# Patient Record
Sex: FEMALE | Race: WHITE | NOT HISPANIC OR LATINO | Employment: OTHER | URBAN - METROPOLITAN AREA
[De-identification: names, ages, dates, MRNs, and addresses within clinical notes are randomized per-mention and may not be internally consistent; named-entity substitution may affect disease eponyms.]

---

## 2017-05-18 ENCOUNTER — OFFICE VISIT (OUTPATIENT)
Dept: OPHTHALMOLOGY | Facility: CLINIC | Age: 77
End: 2017-05-18
Attending: OPHTHALMOLOGY
Payer: COMMERCIAL

## 2017-05-18 DIAGNOSIS — H53.40 VISUAL FIELD DEFECT: ICD-10-CM

## 2017-05-18 DIAGNOSIS — Z96.1 PSEUDOPHAKIA: ICD-10-CM

## 2017-05-18 DIAGNOSIS — H35.352 CYSTOID MACULAR DEGENERATION OF RETINA, LEFT: ICD-10-CM

## 2017-05-18 DIAGNOSIS — H54.7 DECREASED VISION: Primary | ICD-10-CM

## 2017-05-18 PROCEDURE — 99215 OFFICE O/P EST HI 40 MIN: CPT | Mod: ZF

## 2017-05-18 PROCEDURE — 92015 DETERMINE REFRACTIVE STATE: CPT | Mod: 52,ZF

## 2017-05-18 RX ORDER — FLUOROMETHOLONE 0.1 %
1 SUSPENSION, DROPS(FINAL DOSAGE FORM)(ML) OPHTHALMIC (EYE) DAILY
Qty: 3 BOTTLE | Refills: 3 | Status: SHIPPED | OUTPATIENT
Start: 2017-05-18 | End: 2017-11-28

## 2017-05-18 RX ORDER — PREDNISOLONE ACETATE 10 MG/ML
1 SUSPENSION/ DROPS OPHTHALMIC DAILY
Qty: 1 BOTTLE | Refills: 3 | Status: CANCELLED | OUTPATIENT
Start: 2017-05-18

## 2017-05-18 RX ORDER — PREDNISOLONE ACETATE 10 MG/ML
1 SUSPENSION/ DROPS OPHTHALMIC 4 TIMES DAILY
COMMUNITY
End: 2017-09-28

## 2017-05-18 RX ORDER — BROMFENAC 1.03 MG/ML
1 SOLUTION/ DROPS OPHTHALMIC DAILY
Qty: 1 BOTTLE | Refills: 3 | Status: SHIPPED | OUTPATIENT
Start: 2017-05-18 | End: 2017-05-18

## 2017-05-18 RX ORDER — BROMFENAC 1.03 MG/ML
1 SOLUTION/ DROPS OPHTHALMIC DAILY
Qty: 3 BOTTLE | Refills: 3 | Status: SHIPPED | OUTPATIENT
Start: 2017-05-18 | End: 2017-11-28

## 2017-05-18 RX ORDER — DIFLUPREDNATE OPHTHALMIC 0.5 MG/ML
1 EMULSION OPHTHALMIC 3 TIMES DAILY
Qty: 1 BOTTLE | Refills: 3 | Status: SHIPPED | OUTPATIENT
Start: 2017-05-18 | End: 2017-05-25

## 2017-05-18 RX ORDER — FLUOROMETHOLONE 0.1 %
1 SUSPENSION, DROPS(FINAL DOSAGE FORM)(ML) OPHTHALMIC (EYE) DAILY
Qty: 1 BOTTLE | Refills: 3 | Status: SHIPPED | OUTPATIENT
Start: 2017-05-18 | End: 2017-05-18

## 2017-05-18 ASSESSMENT — VISUAL ACUITY
OS_SC: 20/40
CORRECTION_TYPE: GLASSES, CONTACTS
OD_CC+: -3
OD_CC: 20/30
OS_SC+: -2
METHOD: SNELLEN - LINEAR

## 2017-05-18 ASSESSMENT — EXTERNAL EXAM - LEFT EYE: OS_EXAM: NORMAL

## 2017-05-18 ASSESSMENT — REFRACTION_MANIFEST
OS_CYLINDER: +2.50
OS_AXIS: 110
OS_SPHERE: -1.75

## 2017-05-18 ASSESSMENT — TONOMETRY
OD_IOP_MMHG: 14
IOP_METHOD: TONOPEN
OS_IOP_MMHG: 16

## 2017-05-18 ASSESSMENT — SLIT LAMP EXAM - LIDS
COMMENTS: NORMAL
COMMENTS: NORMAL

## 2017-05-18 ASSESSMENT — CONF VISUAL FIELD
OS_NORMAL: 1
OD_NORMAL: 1

## 2017-05-18 ASSESSMENT — CUP TO DISC RATIO
OD_RATIO: 0.2
OS_RATIO: 0.1

## 2017-05-18 ASSESSMENT — EXTERNAL EXAM - RIGHT EYE: OD_EXAM: NORMAL

## 2017-05-18 NOTE — PROGRESS NOTES
CC: CME after RD OS    HPI:   76 year old here for follow up for h/o Retinal detachment  left eye and persistent cystoid macular edema. Feels that vision is slightly blurrier in left eye as well as micropsia left eye. No photophobia.     Ran out of Bromday last week~5 days ago and only using steroid drop daily now.     POH  Pars plana vitrectomy (PPV)/SBP/PPL/FGx C3F8 10/3/13 for recurrent Rhegmatogenous retinal detachment and cataract  Pars plana vitrectomy OS  (PPV)/FGx 6/2013 by Oli ghotra with Dr. Abraham to left eye on 10/15/14      Gtts:  PF qday OS   Bromday OS (ran out 5 days ago)      RETINAL IMAGING  OCT 05/18/17   Right eye- Epiretinal membrane with traction and distortion of fovea, stable  Left eye - mild Epiretinal membrane, slightly worsening CME. -->417->347        AF   3-6-15  right eye - macula irregular  left eye - macula irregular, optic nerve head with bright hyperAF    fluorescein angiography 4-21-15  right eye - mild macular leakge  left eye - 1-2+ macular leakage, 1+ optic nerve head leakage    Visual Field:  OVF G-Top  3/6/15  Right eye: reliable, diffuse defects, non-specific   Left eye: reliable, diffuse defects, non-specific    Assessment & Plan      Magali Macdonald is a 76 year old female with the following diagnoses:   1. Decreased vision - Left Eye    2. Cystoid macular degeneration of retina, left    3. Visual field defect    4. Pseudophakia - Both Eyes         - Cystoid macular edema left eye - nearly resolved  - Has been using Predforte and bromfenac daily  - Continue Bromday daily left eye  - Switch to FML daily left eye   - OK to update contact lens prescription at this time  - Encouraged artificial tears twice a day and as needed       Patient disposition:   Return in about 4 weeks (around 6/15/2017) for Follow Up Joel Coker, OCT Macula.       Attending Physician Attestation: Complete documentation of historical and exam elements from today's encounter  can be found in the full encounter summary report (not reduplicated in this progress note). I personally obtained the chief complaint(s) and history of present illness.  I confirmed and edited as necessary the review of systems, past medical/surgical history, family history, social history, and examination findings as documented by others; and I examined the patient myself. I personally reviewed the relevant tests, images, and reports as documented above. I formulated and edited as necessary the assessment and plan and discussed the findings and management plan with the patient and family. - Moises Abraham MD  5/18/2017

## 2017-05-18 NOTE — MR AVS SNAPSHOT
After Visit Summary   5/18/2017    Magali Macdonald    MRN: [id number]           Patient Information     Date Of Birth          1940        Visit Information        Provider Department      5/18/2017 7:30 AM Moises Abraham MD [facility] Clinic        Today's Diagnoses     Decreased vision - Left Eye    -  1    Cystoid macular degeneration of retina, left        Visual field defect        Pseudophakia - Both Eyes           Follow-ups after your visit        Follow-up notes from your care team     Return in about 4 weeks (around 6/15/2017) for Follow Up Joel Coker, OCT Macula.      Future tests that were ordered for you today     Open Future Orders        Priority Expected Expires Ordered    OCT Retina Spectralis OU (both eyes) Routine  11/17/2018 5/18/2017            Who to contact     Please call your clinic at [phone] to:    Ask questions about your health    Make or cancel appointments    Discuss your medicines    Learn about your test results    Speak to your doctor   If you have compliments or concerns about an experience at your clinic, or if you wish to file a complaint, please contact [facility] Patient Relations at [phone] or email us at [email or web address]         Additional Information About Your Visit        MyChart Information     [employer] gives you secure access to your electronic health record. If you see a primary care provider, you can also send messages to your care team and make appointments. If you have questions, please call your primary care clinic.  If you do not have a primary care provider, please call [phone] and they will assist you.      [employer] is an electronic gateway that provides easy, online access to your medical records. With [employer], you can request a clinic appointment, read your test results, renew a prescription or communicate with your care team.     To access your existing account, please contact  your [facility] or call [phone] for assistance.        Care EveryWhere ID     This is your Care EveryWhere ID. This could be used by other organizations to access your [place] medical records  [id number]         Blood Pressure from Last 3 Encounters:   No data found for BP    Weight from Last 3 Encounters:   No data found for Wt                 Today's Medication Changes          These changes are accurate as of: 5/18/17  9:23 AM.  If you have any questions, ask your nurse or doctor.               Start taking these medicines.        Dose/Directions    Bromfenac Sodium 0.09 % Soln   Commonly known as:  BROMDAY   Used for:  Cystoid macular degeneration of retina, left, Visual field defect   Started by:  Moises Abraham MD        Dose:  1 drop   Place 1 drop Into the left eye daily   Quantity:  3 Bottle   Refills:  3       fluorometholone 0.1 % ophthalmic susp   Commonly known as:  FML LIQUIFILM   Started by:  Moises Abraham MD        Dose:  1 drop   Place 1 drop Into the left eye daily   Quantity:  3 Bottle   Refills:  3            Where to get your medicines      These medications were sent to [facility] [address] [place] [address]     Phone:  [phone]     Bromfenac Sodium 0.09 % Soln    difluprednate 0.05 % ophthalmic emulsion    fluorometholone 0.1 % ophthalmic susp                Primary Care Provider Office Phone # Fax #    JESÚS Yanes [phone]       [place] [address]        Thank you!     Thank you for choosing EYE CLINIC  for your care. Our goal is always to provide you with excellent care. Hearing back from our patients is one way we can continue to improve our services. Please take a few minutes to complete the written survey that you may receive in the mail after your visit with us. Thank you!             Your Updated  Medication List - Protect others around you: Learn how to safely use, store and throw away your medicines at www.disposemymeds.org.          This list is accurate as of: 5/18/17  9:23 AM.  Always use your most recent med list.                   Brand Name Dispense Instructions for use    Bromfenac Sodium 0.09 % Soln    BROMDAY    3 Bottle    Place 1 drop Into the left eye daily       calcium carb 1250 mg (500 mg [place])/vitamin D 200 units 500-200 MG-UNIT per tablet    OSCAL with D     Take 1 tablet by mouth 2 times daily (with meals)       difluprednate 0.05 % ophthalmic emulsion    DUREZOL    1 Bottle    Place 1 drop Into the left eye 3 times daily       fluorometholone 0.1 % ophthalmic susp    FML LIQUIFILM    3 Bottle    Place 1 drop Into the left eye daily       levothyroxine 25 mcg/mL Susp    SYNTHROID     Take by mouth daily Pt alternating 75mg one day and 50mg the next.       OMEGA-3 FISH OIL PO          prednisoLONE acetate 1 % ophthalmic susp    PRED FORTE     1 drop 4 times daily Using qd LE only       TRAZODONE HCL PO      Take 0.5 tablets by mouth At Bedtime       VITAMIN B 12 PO

## 2017-05-18 NOTE — NURSING NOTE
Chief Complaints and History of Present Illnesses   Patient presents with     Follow Up For     Foggy VA LE     HPI    Last Eye Exam:  12/20/16   Affected eye(s):  Left   Symptoms:     Blurred vision   Decreased vision      Duration:  2 months   Frequency:  Constant, Daily          Comments:  Pt complains of foggy vision LE. Has been for last 2 months. Notes that she ran out of Bromday last week and currently using Prednisolone qd LE. Denies any pain today. Notes that she is seeing small floaters morning only, thinks she sees 2 spots. Denies any flashing lights. SUMIT SO, COA 7:47 AM 05/18/2017

## 2017-09-27 ENCOUNTER — TELEPHONE (OUTPATIENT)
Dept: OPHTHALMOLOGY | Facility: CLINIC | Age: 77
End: 2017-09-27

## 2017-09-27 NOTE — TELEPHONE ENCOUNTER
Pt would like to review if eye doing better and if needs to continue eye drops  Unsure of any vision changes-- blurry vision in AM in left eye then improves during day  No eye pain    Pt would like to see dr. Maldonado soon  Reviewed will be on paternity leave soon and current schedule overbooked  Offered evaluation with [facility] and pt accepts  Able to schedule tomorrow with dr. Sanchez  Pt seemed pleased with date/time  Jason Ortiz RN 4:57 PM 09/27/17

## 2017-09-27 NOTE — TELEPHONE ENCOUNTER
----- Message from Mallorie Crockett sent at 9/27/2017  4:17 PM CDT -----  Regarding: Pt of Dr Maldonado  Contact: [phone]  Pt Ph # [phone],    Pt of Dr Maldonado,    Pt wanted to get in asap with Dr Maldonado for eye changes and to see about continuing or discontinuing medications, No openings until November, so Pt said wanted to talk to Nurse instead to see what she can do,    Thanks,  Mallorie in Call Center    Please DO NOT send this message and/or reply back to sender.  Call Center Representatives DO NOT respond to messages.

## 2017-09-28 ENCOUNTER — OFFICE VISIT (OUTPATIENT)
Dept: OPHTHALMOLOGY | Facility: CLINIC | Age: 77
End: 2017-09-28
Attending: OPHTHALMOLOGY
Payer: COMMERCIAL

## 2017-09-28 DIAGNOSIS — H35.352 CME (CYSTOID MACULAR EDEMA), LEFT: Primary | ICD-10-CM

## 2017-09-28 DIAGNOSIS — H18.20 CORNEAL EDEMA OF LEFT EYE: ICD-10-CM

## 2017-09-28 PROCEDURE — 92134 CPTRZ OPH DX IMG PST SGM RTA: CPT | Mod: ZF | Performed by: OPHTHALMOLOGY

## 2017-09-28 PROCEDURE — 99213 OFFICE O/P EST LOW 20 MIN: CPT | Mod: 25,ZF

## 2017-09-28 RX ORDER — SILICONE ADHESIVE 1.5" X 3"
1 SHEET (EA) TOPICAL AT BEDTIME
Qty: 1 BOTTLE | Refills: 11 | Status: SHIPPED | OUTPATIENT
Start: 2017-09-28 | End: 2018-06-12

## 2017-09-28 ASSESSMENT — VISUAL ACUITY
OS_CC+: -3
OS_SC+: +1
OD_CC+: -3
OS_SC: 20/60
CORRECTION_TYPE: CONTACTS
OS_CC: 20/50
METHOD: SNELLEN - LINEAR
OD_CC: 20/25

## 2017-09-28 ASSESSMENT — SLIT LAMP EXAM - LIDS
COMMENTS: NORMAL
COMMENTS: NORMAL

## 2017-09-28 ASSESSMENT — REFRACTION_WEARINGRX
OS_SPHERE: -2.00
OD_ADD: +3.00
OS_CYLINDER: +3.00
OD_AXIS: 035
OS_AXIS: 110
OD_SPHERE: -6.00
SPECS_TYPE: PAL
OD_CYLINDER: +0.50

## 2017-09-28 ASSESSMENT — EXTERNAL EXAM - LEFT EYE: OS_EXAM: NORMAL

## 2017-09-28 ASSESSMENT — PACHYMETRY
OS_CT(UM): 665
OD_CT(UM): 601

## 2017-09-28 ASSESSMENT — TONOMETRY
OD_IOP_MMHG: 18
OS_IOP_MMHG: 09
IOP_METHOD: ICARE

## 2017-09-28 ASSESSMENT — CUP TO DISC RATIO
OD_RATIO: 0.2
OS_RATIO: 0.1

## 2017-09-28 ASSESSMENT — EXTERNAL EXAM - RIGHT EYE: OD_EXAM: NORMAL

## 2017-09-28 ASSESSMENT — CONF VISUAL FIELD
METHOD: COUNTING FINGERS
OD_NORMAL: 1
OS_NORMAL: 1

## 2017-09-28 NOTE — MR AVS SNAPSHOT
After Visit Summary   9/28/2017    Magali Macdonald    MRN: [id number]           Patient Information     Date Of Birth          1940        Visit Information        Provider Department      9/28/2017 1:15 PM Rama Narayanan MD Eye Clinic        Today's Diagnoses     CME (cystoid macular edema), left    -  1    Corneal edema of left eye           Follow-ups after your visit        Follow-up notes from your care team     Return in about 4 weeks (around 10/26/2017) for DFE, OCT Mac, CME.      Your next 10 appointments already scheduled     Oct 06, 2017  8:00 AM CDT   NEW [place]NEA with Phillip Canchola MD   Eye Clinic ([facility])    [address] [place] [id number]   [phone]            Nov 02, 2017 10:15 AM CDT   RETURN RETINA with Rama Narayanan MD   Eye Clinic ([facility])    [address]  [address] [address] [phone]              Who to contact     Please call your clinic at [phone] to:    Ask questions about your health    Make or cancel appointments    Discuss your medicines    Learn about your test results    Speak to your doctor   If you have compliments or concerns about an experience at your clinic, or if you wish to file a complaint, please contact [facility] Patient Relations at [phone] or email us at [email or web address]         Additional Information About Your Visit        Wantfulhart Information     [employer] gives you secure access to your electronic health record. If you see a primary care provider, you can also send messages to your care team and make appointments. If you have questions, please call your primary care clinic.  If you do not have a primary care provider, please call [phone] and they will assist you.      [employer] is an electronic gateway that provides easy, online  access to your medical records. With [employer], you can request a clinic appointment, read your test results, renew a prescription or communicate with your care team.     To access your existing account, please contact your [facility] or call [phone] for assistance.        Care EveryWhere ID     This is your Care EveryWhere ID. This could be used by other organizations to access your [place] medical records  [id number]         Blood Pressure from Last 3 Encounters:   No data found for BP    Weight from Last 3 Encounters:   No data found for Wt              We Performed the Following     OCT Retina Spectralis OU (both eyes)          Today's Medication Changes          These changes are accurate as of: 9/28/17  2:46 PM.  If you have any questions, ask your nurse or doctor.               Start taking these medicines.        Dose/Directions    sodium chloride 5 % ophthalmic solution   Commonly known as:  HANDY 128   Used for:  Corneal edema of left eye   Started by:  Rama Narayanan MD        Dose:  1 drop   Place 1 drop Into the left eye At Bedtime   Quantity:  1 Bottle   Refills:  11            Where to get your medicines      These medications were sent to [facility] [address] [place] [address]     Phone:  [phone]     sodium chloride 5 % ophthalmic solution                Primary Care Provider Office Phone # Fax #    M Essie Yanes [phone]       [facility] [address] [place], [place] [id number]        Equal Access to Services     FLAVIO DEJESUS AH: Hadii lisandro martinez hadasho Soomaali, waaxda luqadaha, qaybta kaalmada adeegyada, laxmi livingston. So [facility] [phone].    ATENCIÓN: Si habla español, tiene a amaya disposición servicios gratuitos de asistencia lingüística. Llame al [phone].    We comply with applicable federal civil rights laws and  [place] laws. We do not discriminate on the basis of race, color, national origin, age, disability sex, sexual orientation or gender identity.            Thank you!     Thank you for choosing EYE CLINIC  for your care. Our goal is always to provide you with excellent care. Hearing back from our patients is one way we can continue to improve our services. Please take a few minutes to complete the written survey that you may receive in the mail after your visit with us. Thank you!             Your Updated Medication List - Protect others around you: Learn how to safely use, store and throw away your medicines at www.disposemymeds.org.          This list is accurate as of: 9/28/17  2:46 PM.  Always use your most recent med list.                   Brand Name Dispense Instructions for use Diagnosis    Bromfenac Sodium 0.09 % Soln    BROMDAY    3 Bottle    Place 1 drop Into the left eye daily    Cystoid macular degeneration of retina, left, Visual field defect       calcium carb 1250 mg (500 mg [place])/vitamin D 200 units 500-200 MG-UNIT per tablet    OSCAL with D     Take 1 tablet by mouth 2 times daily (with meals)        fluorometholone 0.1 % ophthalmic susp    FML LIQUIFILM    3 Bottle    Place 1 drop Into the left eye daily        levothyroxine 25 mcg/mL Susp    SYNTHROID     Take by mouth daily Pt alternating 75mg one day and 50mg the next.        OMEGA-3 FISH OIL PO           sodium chloride 5 % ophthalmic solution    HANDY 128    1 Bottle    Place 1 drop Into the left eye At Bedtime    Corneal edema of left eye       TRAZODONE HCL PO      Take 0.5 tablets by mouth At Bedtime        VITAMIN B 12 PO

## 2017-09-28 NOTE — PROGRESS NOTES
I have confirmed the patient's and reviewed Past Medical History, Past Surgical History, Social History, Family History, Problem List, Medication List and agree with Tech note.      CC: blurry vision Left Eye, history of CME after RD OS    HPI: 77 year old here for follow up for h/o Retinal detachment  left eye and persistent cystoid macular edema. She recently experiences for the past month blurring of vision in her left eye in am that last for two hours and resolves.    POH  Pars plana vitrectomy (PPV)/SBP/PPL/FGx C3F8 10/3/13 for recurrent Rhegmatogenous retinal detachment and cataract  Pars plana vitrectomy OS  (PPV)/FGx 6/2013 by Oli  ACIOL placement with Dr. Abraham to left eye on 10/15/14      Gtts:  FML daily  Bromfenac daily (ran out two weeks ago)      RETINAL IMAGING  OCT 12-20-16  Right eye- Epiretinal membrane with traction and distortion of fovea, stable  Left eye - mild Epiretinal membrane, two small new pockets of Cystoid Macular Edema inferior macula    AF   3-6-15  right eye - macula irregular  left eye - macula irregular, optic nerve head with bright hyperAF    fluorescein angiography 4-21-15  right eye - mild macular leakge  left eye - 1-2+ macular leakage, 1+ optic nerve head leakage    Visual Field:  OVF G-Top  3/6/15  Right eye: reliable, diffuse defects, non-specific   Left eye: reliable, diffuse defects, non-specific        Assessment/Plan:  0) Corneal edema (pseudophakic keratopathy)  - likely secondary to ACIOL, pachy as above  - start rah Four times a day Left Eye   - will have see Dr. 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 Abraham     8) Nuclear sclerosis right eye mild      return to clinic 2 weeks with Dr. Canchola, retina 1-2 months, OCT OU    Tc Bai MD, PhD  Vitreoretinal Surgery Fellow    Attestation:  I have seen and examined the patient with Dr. Bai and agree with the findings in this note, as well as the interpretations of the diagnostic tests.      Rama Pérez MD PhD.  Professor & Chair

## 2017-09-28 NOTE — NURSING NOTE
Chief Complaints and History of Present Illnesses   Patient presents with     Follow Up For     9 month follow up CME after RD OS     HPI    Affected eye(s):  Both   Symptoms:     No floaters   No flashes   No redness   No Dryness         Do you have eye pain now?:  No      Comments:  Pt states vision is stable in RE. Vision in LE fluctuates daily. Pt notes that vision in LE is very cloudy upon waking but clears as the day goes on.    Ute FINLEY September 28, 2017 1:17 PM

## 2017-10-05 DIAGNOSIS — H18.20 CORNEAL EDEMA: Primary | ICD-10-CM

## 2017-10-06 ENCOUNTER — OFFICE VISIT (OUTPATIENT)
Dept: OPHTHALMOLOGY | Facility: CLINIC | Age: 77
End: 2017-10-06
Attending: OPHTHALMOLOGY
Payer: COMMERCIAL

## 2017-10-06 DIAGNOSIS — H59.012 PSEUDOPHAKIC BULLOUS KERATOPATHY OF LEFT EYE: Primary | ICD-10-CM

## 2017-10-06 PROCEDURE — 99213 OFFICE O/P EST LOW 20 MIN: CPT | Mod: ZF

## 2017-10-06 ASSESSMENT — VISUAL ACUITY
OD_CC: 20/25
CORRECTION_TYPE: CONTACTS
OS_SC: 20/100
OD_CC+: -3
METHOD: SNELLEN - LINEAR

## 2017-10-06 ASSESSMENT — CONF VISUAL FIELD
OS_NORMAL: 1
OD_NORMAL: 1

## 2017-10-06 ASSESSMENT — TONOMETRY
OS_IOP_MMHG: 16
IOP_METHOD: ICARE
OD_IOP_MMHG: 21

## 2017-10-06 ASSESSMENT — PACHYMETRY
OD_CT(UM): 601
OS_CT(UM): 665

## 2017-10-06 NOTE — MR AVS SNAPSHOT
After Visit Summary   10/6/2017    Magali Macdonald    MRN: [id number]           Patient Information     Date Of Birth          1940        Visit Information        Provider Department      10/6/2017 8:00 AM Phillip Canchola MD Eye Clinic        Today's Diagnoses     Pseudophakic bullous keratopathy of left eye    -  1       Follow-ups after your visit        Follow-up notes from your care team     Return in about 3 months (around 2018).      Your next 10 appointments already scheduled     2017 10:15 AM CDT   RETURN RETINA with Rama Narayanan MD   Eye Clinic ([facility])    [address]  [address] [facility]  9th Fl Clin 9[address]  [facility] [address] [phone]              Who to contact     Please call your clinic at [phone] to:    Ask questions about your health    Make or cancel appointments    Discuss your medicines    Learn about your test results    Speak to your doctor   If you have compliments or concerns about an experience at your clinic, or if you wish to file a complaint, please contact [facility] Patient Relations at [phone] or email us at [email or web address]         Additional Information About Your Visit        MyChart Information     [employer] is an electronic gateway that provides easy, online access to your medical records. With [employer], you can request a clinic appointment, read your test results, renew a prescription or communicate with your care team.     To sign up for [employer] visit the website at [email or web address]   You will be asked to enter the access code listed below, as well as some personal information. Please follow the directions to create your username and password.     Your access code is: [email or web address]  Expires: 2018  5:26 PM     Your access code will  in 90 days. If you need help or a new code, please contact your [facility]  Clinic or call [phone] for assistance.        Care EveryWhere ID     This is your Care EveryWhere ID. This could be used by other organizations to access your [place] medical records  [id number]         Blood Pressure from Last 3 Encounters:   No data found for BP    Weight from Last 3 Encounters:   No data found for Wt              We Performed the Following     US OPHTHALMIC DIAG, PACHYMETRY        Primary Care Provider Office Phone # Fax #    JESÚS Yanes [phone]       [facility] [address]  [facility] [address]        Equal Access to Services     [facility]: Hadii aad ku hadasho Soomaali, waaxda luqadaha, qaybta kaalmada adeegyada, waxtheodora hectorin hayjennifer medrano . So [facility] [phone].    ATENCIÓN: Si habla español, tiene a amaya disposición servicios gratuitos de asistencia lingüística. [facility] [phone].    We comply with applicable federal civil rights laws and [place] laws. We do not discriminate on the basis of race, color, national origin, age, disability, sex, sexual orientation, or gender identity.            Thank you!     Thank you for choosing EYE CLINIC  for your care. Our goal is always to provide you with excellent care. Hearing back from our patients is one way we can continue to improve our services. Please take a few minutes to complete the written survey that you may receive in the mail after your visit with us. Thank you!             Your Updated Medication List - Protect others around you: Learn how to safely use, store and throw away your medicines at www.disposemymeds.org.          This list is accurate as of: 10/6/17 11:59 PM.  Always use your most recent med list.                   Brand Name Dispense Instructions for use Diagnosis    Bromfenac Sodium 0.09 % Soln    BROMDAY    3 Bottle    Place 1 drop Into the left eye daily    Cystoid macular degeneration of retina, left, Visual field defect       calcium carb 1250 mg (500  mg [place])/vitamin D 200 units 500-200 MG-UNIT per tablet    OSCAL with D     Take 1 tablet by mouth 2 times daily (with meals)        CITALOPRAM HYDROBROMIDE PO           fluorometholone 0.1 % ophthalmic susp    FML LIQUIFILM    3 Bottle    Place 1 drop Into the left eye daily        levothyroxine 25 mcg/mL Susp    SYNTHROID     Take by mouth daily Pt alternating 75mg one day and 50mg the next.        OMEGA-3 FISH OIL PO           sodium chloride 5 % ophthalmic solution    HANDY 128    1 Bottle    Place 1 drop Into the left eye At Bedtime    Corneal edema of left eye       TRAZODONE HCL PO      Take 0.5 tablets by mouth At Bedtime        VITAMIN B 12 PO

## 2017-10-06 NOTE — NURSING NOTE
Chief Complaints and History of Present Illnesses   Patient presents with     Consult For     Corneal Edema LE      HPI    Additional Referring Providers:  Dr. Rama Narayanan MD    Affected eye(s):  Left   Symptoms:        Unknown duration       Do you have eye pain now?:  No      Comments:  Sent here at request of Dr. Lady PAREKH. Pt complains of blurry vision LE. Denies any floaters or flashing lights. Notes that she is using Pietro LE QHS and has a lot of burning after using. Zach any pain or discomfort today. SUMIT SO, COA 8:29 AM 10/06/2017

## 2017-10-18 ASSESSMENT — SLIT LAMP EXAM - LIDS
COMMENTS: NORMAL
COMMENTS: NORMAL

## 2017-10-18 ASSESSMENT — EXTERNAL EXAM - LEFT EYE: OS_EXAM: NORMAL

## 2017-10-18 ASSESSMENT — CUP TO DISC RATIO
OD_RATIO: 0.2
OS_RATIO: 0.1

## 2017-10-18 ASSESSMENT — EXTERNAL EXAM - RIGHT EYE: OD_EXAM: NORMAL

## 2017-10-18 NOTE — PROGRESS NOTES
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 Abraham to left eye on 10/15/14      Gtts:  FML daily stopped 2 weeks ago  Bromfenac daily stopped 2 weeks ago    Assessment/Plan:    1) Corneal edema (pseudophakic keratopathy)  - likely secondary to ACIOL, pachy 749 left eye (597 right eye)  - continue rah Four times a day Left Eye   - counseled re findings and options  - patient would like to defer intervention for now  Reassess in 3 months       2) Cystoid macular edema left eye   - last in 05/2017, two new small pockets of fluid inferior to fovea   - stopped FML and bromfenac recently, observe    3) Epiretinal membrane left eye    - very mild, not likely significant, observe    4) Pseudophakic left eye    - stable, follow up with Dr. Abraham     5) Nuclear sclerosis right eye mild      Return to clinic 3 months to reassess      ~~~~~~~~~~~~~~~~~~~~~~~~~~~~~~~~~~~~~~~~~~~~~~~~~~~~~~~~~~~~~~~~    Complete documentation of historical and exam elements from today's encounter can be found in the full encounter summary report (not reduplicated in this progress note). I personally obtained the chief complaint(s) and history of present illness.  I confirmed and edited as necessary the review of systems, past medical/surgical history, family history, social history, and examination findings as documented by others.  I examined the patient myself, and I personally reviewed the relevant tests, images, and reports as documented above. I formulated and edited as necessary the assessment and plan and discussed the findings and management plan with the patient and family.     Phillip Canchola MD, MA  Director, Cornea & Anterior Segment  [facility]  Department of Ophthalmology & Visual Neuroscience

## 2017-11-02 ENCOUNTER — OFFICE VISIT (OUTPATIENT)
Dept: OPHTHALMOLOGY | Facility: CLINIC | Age: 77
End: 2017-11-02
Attending: OPHTHALMOLOGY
Payer: COMMERCIAL

## 2017-11-02 DIAGNOSIS — H53.40 VISUAL FIELD DEFECT: ICD-10-CM

## 2017-11-02 DIAGNOSIS — H35.352 CME (CYSTOID MACULAR EDEMA), LEFT: ICD-10-CM

## 2017-11-02 DIAGNOSIS — H35.352 CYSTOID MACULAR DEGENERATION OF RETINA, LEFT: ICD-10-CM

## 2017-11-02 PROCEDURE — 99213 OFFICE O/P EST LOW 20 MIN: CPT | Mod: 25,ZF

## 2017-11-02 PROCEDURE — 92134 CPTRZ OPH DX IMG PST SGM RTA: CPT | Mod: ZF | Performed by: OPHTHALMOLOGY

## 2017-11-02 ASSESSMENT — TONOMETRY
OS_IOP_MMHG: 15
OD_IOP_MMHG: 19
IOP_METHOD: TONOPEN

## 2017-11-02 ASSESSMENT — CONF VISUAL FIELD
OD_NORMAL: 1
METHOD: COUNTING FINGERS
OS_NORMAL: 1

## 2017-11-02 ASSESSMENT — VISUAL ACUITY
METHOD: SNELLEN - LINEAR
OS_SC: 20/50
CORRECTION_TYPE: CONTACTS
OD_CC+: -1
OS_SC+: -2
OD_CC: 20/25

## 2017-11-02 ASSESSMENT — SLIT LAMP EXAM - LIDS
COMMENTS: NORMAL
COMMENTS: NORMAL

## 2017-11-02 ASSESSMENT — CUP TO DISC RATIO
OS_RATIO: 0.1
OD_RATIO: 0.2

## 2017-11-02 ASSESSMENT — EXTERNAL EXAM - RIGHT EYE: OD_EXAM: NORMAL

## 2017-11-02 ASSESSMENT — EXTERNAL EXAM - LEFT EYE: OS_EXAM: NORMAL

## 2017-11-02 NOTE — PROGRESS NOTES
I have confirmed the patient's and reviewed Past Medical History, Past Surgical History, Social History, Family History, Problem List, Medication List and agree with Tech note.      CC: blurry vision Left Eye, history of CME after RD OS    HPI: 77 year old here for follow up for h/o Retinal detachment  left eye and persistent cystoid macular edema. She recently experiences for the past month blurring of vision in her left eye in am that last for two hours and resolves.  No interval change per patient     POH  Pars plana vitrectomy (PPV)/SBP/PPL/FGx C3F8 10/3/13 for recurrent Rhegmatogenous retinal detachment and cataract  Pars plana vitrectomy OS  (PPV)/FGx 6/2013 by Oli ghotra with Dr. Abraham to left eye on 10/15/14      Gtts:  Sodium chloride daily left eye         RETINAL IMAGING  OCT 11-02-17  Right eye- Epiretinal membrane with traction and distortion of fovea, stable  Left eye - mild Epiretinal membrane, two small stable pockets of Cystoid Macular Edema inferior macula, stable    AF   3-6-15  right eye - macula irregular  left eye - macula irregular, optic nerve head with bright hyperAF    fluorescein angiography 4-21-15  right eye - mild macular leakge  left eye - 1-2+ macular leakage, 1+ optic nerve head leakage    Visual Field:  OVF G-Top  3/6/15  Right eye: reliable, diffuse defects, non-specific   Left eye: reliable, diffuse defects, non-specific        Assessment/Plan:  0) Corneal edema (pseudophakic keratopathy)  - likely secondary to ACIOL, pachy as above  - start rah Four times a day Left Eye   - will have see Dr. 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 Abraham     8) Nuclear sclerosis right eye mild      return to clini, retina 3 months, OCT OU.  With Kandi Pérez MD PhD.  Professor & Chair

## 2017-11-02 NOTE — MR AVS SNAPSHOT
After Visit Summary   2017    Magali Macdonald    MRN: [id number]           Patient Information     Date Of Birth          1940        Visit Information        Provider Department      2017 10:15 AM Rama Narayanan MD [facility] Clinic        Today's Diagnoses     Cystoid macular degeneration of retina, left        Visual field defect        CME (cystoid macular edema), left           Follow-ups after your visit        Follow-up notes from your care team     Return in about 3 months (around 2018) for ERM , Exam & OCT OU.      Your next 10 appointments already scheduled     2018  9:15 AM CST   RETURN RETINA with Kandi Maldonado MD   Eye Clinic ([facility])    [address]  [address]  [address] Clin [address]  [facility] [id number] [phone]              Who to contact     Please call your clinic at [phone] to:    Ask questions about your health    Make or cancel appointments    Discuss your medicines    Learn about your test results    Speak to your doctor   If you have compliments or concerns about an experience at your clinic, or if you wish to file a complaint, please contact [facility] Patient Relations at [phone] or email us at [email or web address]         Additional Information About Your Visit        MyChart Information     PureWRXt is an electronic gateway that provides easy, online access to your medical records. With [email or web address], you can request a clinic appointment, read your test results, renew a prescription or communicate with your care team.     To sign up for PureWRXt visit the website at [email or web address]   You will be asked to enter the access code listed below, as well as some personal information. Please follow the directions to create your username and password.     Your access code is: [email or web address]  Expires: 2018  5:26 PM     Your access code will  in 90  days. If you need help or a new code, please contact your [facility] or call [phone] for assistance.        Care EveryWhere ID     This is your Care EveryWhere ID. This could be used by other organizations to access your [place] medical records  [id number]         Blood Pressure from Last 3 Encounters:   No data found for BP    Weight from Last 3 Encounters:   No data found for Wt              We Performed the Following     OCT Retina Spectralis OU (both eyes)        Primary Care Provider Office Phone # Fax #    JESÚS Yanes [phone]       [facility] [address]  [facility] [address]        Equal Access to Services     [facility] : Hadii aad ku hadasho Soomaali, waaxda luqadaha, qaybta kaalmada adeegyada, waxay krunalin hayyaeln adesusan medrano . So [facility] [phone].    ATENCIÓN: Si habla español, tiene a amaya disposición servicios gratuitos de asistencia lingüística. Llame al [phone].    We comply with applicable federal civil rights laws and [place] laws. We do not discriminate on the basis of race, color, national origin, age, disability, sex, sexual orientation, or gender identity.            Thank you!     Thank you for choosing EYE CLINIC  for your care. Our goal is always to provide you with excellent care. Hearing back from our patients is one way we can continue to improve our services. Please take a few minutes to complete the written survey that you may receive in the mail after your visit with us. Thank you!             Your Updated Medication List - Protect others around you: Learn how to safely use, store and throw away your medicines at www.disposemymeds.org.          This list is accurate as of: 11/2/17 11:39 AM.  Always use your most recent med list.                   Brand Name Dispense Instructions for use Diagnosis    Bromfenac Sodium 0.09 % Soln    BROMDAY    3 Bottle    Place 1 drop Into the left eye daily     Cystoid macular degeneration of retina, left, Visual field defect       calcium carb 1250 mg (500 mg [place])/vitamin D 200 units 500-200 MG-UNIT per tablet    OSCAL with D     Take 1 tablet by mouth 2 times daily (with meals)        CITALOPRAM HYDROBROMIDE PO           fluorometholone 0.1 % ophthalmic susp    FML LIQUIFILM    3 Bottle    Place 1 drop Into the left eye daily        levothyroxine 25 mcg/mL Susp    SYNTHROID     Take by mouth daily Pt alternating 75mg one day and 50mg the next.        OMEGA-3 FISH OIL PO           sodium chloride 5 % ophthalmic solution    HANDY 128    1 Bottle    Place 1 drop Into the left eye At Bedtime    Corneal edema of left eye       TRAZODONE HCL PO      Take 0.5 tablets by mouth At Bedtime        VITAMIN B 12 PO

## 2017-11-02 NOTE — NURSING NOTE
Chief Complaints and History of Present Illnesses   Patient presents with     Follow Up For     cystoid macular edema     HPI    Affected eye(s):  Both   Symptoms:     No blurred vision      Frequency:  Constant       Do you have eye pain now?:  No      Comments:  Pt states no vision changes since last vision. No new flashes or floaters. No pain- Left eye is a little more tender when rubbing her eye or taking makeup off. AT rarely. [address] QHS OS.    Melita Davalos COT 10:45 AM November 2, 2017

## 2017-11-28 ENCOUNTER — OFFICE VISIT (OUTPATIENT)
Dept: OPTOMETRY | Facility: CLINIC | Age: 77
End: 2017-11-28

## 2017-11-28 DIAGNOSIS — H52.31 ANISOMETROPIA AND ANISEIKONIA: Primary | ICD-10-CM

## 2017-11-28 DIAGNOSIS — H52.32 ANISOMETROPIA AND ANISEIKONIA: Primary | ICD-10-CM

## 2017-11-28 DIAGNOSIS — Z96.1 PSEUDOPHAKIA: ICD-10-CM

## 2017-11-28 ASSESSMENT — VISUAL ACUITY
OD_CC: 20/50-2
OS_SC: 20/125
METHOD: SNELLEN - LINEAR
CORRECTION_TYPE: CONTACTS

## 2017-11-28 ASSESSMENT — REFRACTION_WEARINGRX
OS_CYLINDER: +3.00
SPECS_TYPE: PAL
OS_SPHERE: -2.00
OD_AXIS: 035
OD_CYLINDER: +0.50
OS_AXIS: 110
OD_SPHERE: -6.00
OD_ADD: +3.00

## 2017-11-28 ASSESSMENT — EXTERNAL EXAM - LEFT EYE: OS_EXAM: NORMAL

## 2017-11-28 ASSESSMENT — REFRACTION_MANIFEST
OD_SPHERE: -6.50
OD_CYLINDER: +0.50
OD_AXIS: 030

## 2017-11-28 ASSESSMENT — REFRACTION_CURRENTRX
OD_SPHERE: -5.75
OD_DIAMETER: 9.2
OD_BRAND: OXYFLOW
OD_BASECURVE: 8.05
OD_BASECURVE: 8.0
OD_DIAMETER: 8.2
OD_SPHERE: -3.00

## 2017-11-28 ASSESSMENT — SLIT LAMP EXAM - LIDS
COMMENTS: NORMAL
COMMENTS: NORMAL

## 2017-11-28 ASSESSMENT — EXTERNAL EXAM - RIGHT EYE: OD_EXAM: NORMAL

## 2017-11-28 NOTE — PROGRESS NOTES
A/P  1.) Aniseikonia/Anisometropia OU  -Pt broke right lens, previously doing well  -Mild Rx shift OD 2' to cataract, corrects to 20/20  -Order new lens, mail to pt    2.) Corneal edema OS  -Following with Dr. Canchola, considering surgery  -Rec no CL wear left eye until resolved    RTC prn for CL recheck right eye, otherwise 1 year routine    Contact Lens Billing  V-Code:  - GP Spherical  Final Contact Lens Rx      Brand Base Curve Diameter Sphere Lens Addl. Specs   Right ART RGP Optimum Comfort 8.05 8.2 -6.25 plasma treated Blue   Left                  # of units: 1  Price per Unit: $80    This patient requires contact lenses that are medically necessary for either improvement in vision over spectacles, support of the ocular surface, or other therapeutic benefit. These are not cosmetic contact lenses.  Resolution of aniseikonia vs glasses    Encounter Diagnoses   Name Primary?     Anisometropia and aniseikonia Yes     Pseudophakia - Left Eye

## 2017-11-28 NOTE — MR AVS SNAPSHOT
After Visit Summary   2017    Magali Macdonald    MRN: [id number]           Patient Information     Date Of Birth          1940        Visit Information        Provider Department      2017 10:30 AM Geeta Coker OD Eye Clinic         Follow-ups after your visit        Your next 10 appointments already scheduled     2018  2:15 PM CST   RETURN CORNEA with Phillip Canchola MD   Eye Clinic ([facility])    [address]  [address]  [address] [address]  [place] [address]   [phone]            2018  9:15 AM CST   RETURN RETINA with Kandi Maldonado MD   Eye Clinic ([facility])    [address]  [address]  [address] [address]  [place] [address]   [phone]              Who to contact     Please call your clinic at [phone] to:    Ask questions about your health    Make or cancel appointments    Discuss your medicines    Learn about your test results    Speak to your doctor   If you have compliments or concerns about an experience at your clinic, or if you wish to file a complaint, please contact [facility] Patient Relations at [phone] or email us at [email or web address]         Additional Information About Your Visit        MyChart Information     Onyu is an electronic gateway that provides easy, online access to your medical records. With Onyu, you can request a clinic appointment, read your test results, renew a prescription or communicate with your care team.     To sign up for [email or web address] Pharmaceuticalst visit the website at [email or web address]   You will be asked to enter the access code listed below, as well as some personal information. Please follow the directions to create your username and password.     Your access code is: [email or web address]  Expires: 2018  4:26 PM     Your access code will  in 90 days. If you need help or a new code, please  contact your [facility] or call [phone] for assistance.        Care EveryWhere ID     This is your Care EveryWhere ID. This could be used by other organizations to access your [place] medical records  [id number]         Blood Pressure from Last 3 Encounters:   No data found for BP    Weight from Last 3 Encounters:   No data found for Wt              Today, you had the following     No orders found for display       Primary Care Provider Office Phone # Fax #    JESÚS Yanes [phone]       [facility] [address]  [facility] [address]        Equal Access to Services     [facility]: Hadii aad ku hadasho Soomaali, waaxda luqadaha, qaybta kaalmada adeegyada, laxmi talley hayaan adesusan medrano . So [facility] [phone].    ATENCIÓN: Si habla español, tiene a amaya disposición servicios gratuitos de asistencia lingüística. Ll[facility]ake[facility] [phone].    We comply with applicable federal civil rights laws and [place] laws. We do not discriminate on the basis of race, color, national origin, age, disability, sex, sexual orientation, or gender identity.            Thank you!     Thank you for choosing EYE CLINIC  for your care. Our goal is always to provide you with excellent care. Hearing back from our patients is one way we can continue to improve our services. Please take a few minutes to complete the written survey that you may receive in the mail after your visit with us. Thank you!             Your Updated Medication List - Protect others around you: Learn how to safely use, store and throw away your medicines at www.disposemymeds.org.          This list is accurate as of: 11/28/17 11:03 AM.  Always use your most recent med list.                   Brand Name Dispense Instructions for use Diagnosis    Calcium carb-Vitamin D 500 mg [place]-200 units 500-200 MG-UNIT per tablet    OSCAL with D;Oyster Shell Calcium     Take 1 tablet by mouth 2 times  daily (with meals)        CITALOPRAM HYDROBROMIDE PO           levothyroxine 25 mcg/mL Susp    SYNTHROID     Take 75 mg by mouth daily        OMEGA-3 FISH OIL PO           sodium chloride 5 % ophthalmic solution    HANDY 128    1 Bottle    Place 1 drop Into the left eye At Bedtime    Corneal edema of left eye       TRAZODONE HCL PO      Take 0.5 tablets by mouth At Bedtime        VITAMIN B 12 PO

## 2017-12-18 ENCOUNTER — TELEPHONE (OUTPATIENT)
Dept: OPHTHALMOLOGY | Facility: CLINIC | Age: 77
End: 2017-12-18

## 2017-12-18 NOTE — TELEPHONE ENCOUNTER
I spoke to the patient who notes that she has photophobia on sylvia days as well as cloudy days.  I recommended that she look at the [facility] for the driving lens fit over (frame name Elizabeth).  I also set up a tinted lens appointment for her at the time of her next visit.  The patient thanked me for the information.

## 2018-01-04 ENCOUNTER — OFFICE VISIT (OUTPATIENT)
Dept: OPHTHALMOLOGY | Facility: CLINIC | Age: 78
End: 2018-01-04
Attending: OPHTHALMOLOGY
Payer: COMMERCIAL

## 2018-01-04 ENCOUNTER — OFFICE VISIT (OUTPATIENT)
Dept: OPHTHALMOLOGY | Facility: CLINIC | Age: 78
End: 2018-01-04
Payer: COMMERCIAL

## 2018-01-04 DIAGNOSIS — H52.31 ANISOMETROPIA: ICD-10-CM

## 2018-01-04 DIAGNOSIS — H18.20 CORNEAL EDEMA OF LEFT EYE: Primary | ICD-10-CM

## 2018-01-04 DIAGNOSIS — Z96.1 PSEUDOPHAKIA OF LEFT EYE: ICD-10-CM

## 2018-01-04 DIAGNOSIS — H53.8 BLURRED VISION: ICD-10-CM

## 2018-01-04 DIAGNOSIS — H52.32 ANISEIKONIA: Primary | ICD-10-CM

## 2018-01-04 DIAGNOSIS — H35.373 EPIRETINAL MEMBRANE (ERM) OF BOTH EYES: ICD-10-CM

## 2018-01-04 DIAGNOSIS — H35.352 CYSTOID MACULAR DEGENERATION OF LEFT EYE: ICD-10-CM

## 2018-01-04 PROCEDURE — G0463 HOSPITAL OUTPT CLINIC VISIT: HCPCS | Mod: ZF

## 2018-01-04 ASSESSMENT — PACHYMETRY: OS_CT(UM): 610

## 2018-01-04 ASSESSMENT — VISUAL ACUITY
METHOD: SNELLEN - LINEAR
OD_CC: 20/20
CORRECTION_TYPE: CONTACTS
OD_CC+: -1
OD_CC: 20/20
OD_CC+: -1
OS_SC: 20/100
OS_SC: 20/100
METHOD: SNELLEN - LINEAR
CORRECTION_TYPE: CONTACTS

## 2018-01-04 ASSESSMENT — REFRACTION_CURRENTRX
OD_SPHERE: -6.25
OD_BASECURVE: 8.05
OD_DIAMETER: 8.2
OD_ADDL_SPECS: BLUE

## 2018-01-04 ASSESSMENT — TONOMETRY
OS_IOP_MMHG: 10
IOP_METHOD: TONOPEN
OD_IOP_MMHG: RGP
OS_IOP_MMHG: 10
OD_IOP_MMHG: CTL
IOP_METHOD: TONOPEN

## 2018-01-04 ASSESSMENT — SLIT LAMP EXAM - LIDS
COMMENTS: NORMAL

## 2018-01-04 ASSESSMENT — REFRACTION_WEARINGRX
OD_AXIS: 035
OS_SPHERE: -2.00
OS_AXIS: 110
OD_ADD: +3.00
OD_SPHERE: -6.00
OD_CYLINDER: +0.50
SPECS_TYPE: PAL
OS_CYLINDER: +3.00

## 2018-01-04 ASSESSMENT — CONF VISUAL FIELD
OS_NORMAL: 1
OS_NORMAL: 1
OD_NORMAL: 1
METHOD: COUNTING FINGERS
OD_NORMAL: 1

## 2018-01-04 ASSESSMENT — EXTERNAL EXAM - RIGHT EYE
OD_EXAM: NORMAL
OD_EXAM: NORMAL

## 2018-01-04 ASSESSMENT — EXTERNAL EXAM - LEFT EYE: OS_EXAM: NORMAL

## 2018-01-04 NOTE — PROGRESS NOTES
CC: Follow up for corneal edema left eye       POH  Pars plana vitrectomy (PPV)/SBP/PPL/FGx C3F8 10/3/13 for recurrent Rhegmatogenous retinal detachment and cataract  Pars plana vitrectomy OS  (PPV)/FGx 6/2013 by Oli BARRAGAN placement with Dr. Abraham to left eye on 10/15/14      Gtts:  Rah daily OS    Assessment/Plan:    1) Corneal edema (pseudophakic keratopathy)    - likely secondary to ACIOL, pachymetry 610 left eye (from 665)  - increase rah to Four times a day Left Eye   - counseled re findings and options  - patient would like to defer intervention for now  Reassess in 3 months with PRASANNA       2) Cystoid macular edema left eye  - saw Dr. Narayanan 11/17 and has f/up apt in Feb     3) Epiretinal membrane left eye    - very mild, not likely significant, observe    4) Pseudophakic left eye    - stable, follow up with Dr. Abraham     5) Nuclear sclerosis right eye mild          RHYS Plascencia  Cornea fellow      ~~~~~~~~~~~~~~~~~~~~~~~~~~~~~~~~~~~~~~~~~~~~~~~~~~~~~~~~~~~~~~~~    Complete documentation of historical and exam elements from today's encounter can be found in the full encounter summary report (not reduplicated in this progress note). I personally obtained the chief complaint(s) and history of present illness.  I confirmed and edited as necessary the review of systems, past medical/surgical history, family history, social history, and examination findings as documented by others.  I examined the patient myself, and I personally reviewed the relevant tests, images, and reports as documented above. I formulated and edited as necessary the assessment and plan and discussed the findings and management plan with the patient and family.     Phillip Canchola MD, MA  Director, Cornea & Anterior Segment  [facility] Department of Ophthalmology & Visual Neuroscience

## 2018-01-04 NOTE — MR AVS SNAPSHOT
After Visit Summary   2018    Magali Macdonald    MRN: [id number]           Patient Information     Date Of Birth          1940        Visit Information        Provider Department      2018 4:20 PM Geeta Coker OD [facility]hthal[facility]        Today's Diagnoses     Aniseikonia    -  1    Anisometropia        Pseudophakia of left eye           Follow-ups after your visit        Your next 10 appointments already scheduled     2018  9:15 AM CST   RETURN RETINA with Kandi Maldonado MD   Eye Clinic ([facility])    [address]  [address]  [address]th [address] Clin [address]  [place] [address]   [phone]              Who to contact     Please call your clinic at [phone] to:    Ask questions about your health    Make or cancel appointments    Discuss your medicines    Learn about your test results    Speak to your doctor   If you have compliments or concerns about an experience at your clinic, or if you wish to file a complaint, please contact [facility] Patient Relations at [phone] or email us at [email or web address]         Additional Information About Your Visit        MyChart Information     Magistot is an electronic gateway that provides easy, online access to your medical records. With Self Point, you can request a clinic appointment, read your test results, renew a prescription or communicate with your care team.     To sign up for Magistot visit the website at [email or web address]   You will be asked to enter the access code listed below, as well as some personal information. Please follow the directions to create your username and password.     Your access code is: [email or web address]  Expires: 2018  4:26 PM     Your access code will  in 90 days. If you need help or a new code, please contact your [facility] or call [phone] for assistance.        Care  EveryWhere ID     This is your Care EveryWhere ID. This could be used by other organizations to access your [place] medical records  [id number]         Blood Pressure from Last 3 Encounters:   No data found for BP    Weight from Last 3 Encounters:   No data found for Wt              Today, you had the following     No orders found for display       Primary Care Provider Office Phone # Fax #    JESÚS Yanes [phone]       [facility] [address]  [facility] [address]        Equal Access to Services     FLAVIO DEJESUS : Hadii aad ku hadasho Soomaali, waaxda luqadaha, qaybta kaalmada adeegyada, waxay idiin hayaan adesusan [place]arabecky medrano . So [facility] [phone].    ATENCIÓN: Si habla español, tiene a amaya disposición servicios gratuitos de asistencia lingüística. [facility] [phone].    We comply with applicable federal civil rights laws and [place] laws. We do not discriminate on the basis of race, color, national origin, age, disability, sex, sexual orientation, or gender identity.            Thank you!     Thank you for choosing [facility] OPHTHALMOLOGY  for your care. Our goal is always to provide you with excellent care. Hearing back from our patients is one way we can continue to improve our services. Please take a few minutes to complete the written survey that you may receive in the mail after your visit with us. Thank you!             Your Updated Medication List - Protect others around you: Learn how to safely use, store and throw away your medicines at www.disposemymeds.org.          This list is accurate as of: 1/4/18  5:57 PM.  Always use your most recent med list.                   Brand Name Dispense Instructions for use Diagnosis    Calcium carb-Vitamin D 500 mg [place]-200 units 500-200 MG-UNIT per tablet    OSCAL with D;Oyster Shell Calcium     Take 1 tablet by mouth 2 times daily (with meals)        CITALOPRAM HYDROBROMIDE PO           levothyroxine 25 mcg/mL Susp     SYNTHROID     Take 75 mg by mouth daily        OMEGA-3 FISH OIL PO           sodium chloride 5 % ophthalmic solution    HANDY 128    1 Bottle    Place 1 drop Into the left eye At Bedtime    Corneal edema of left eye       TRAZODONE HCL PO      Take 0.5 tablets by mouth At Bedtime        VITAMIN B 12 PO

## 2018-01-04 NOTE — NURSING NOTE
Chief Complaints and History of Present Illnesses   Patient presents with     Follow Up For     Corneal edema (pseudophakic keratopathy     HPI    Affected eye(s):  Both, Left   Symptoms:     Blurred vision   Photophobia         Do you have eye pain now?:  Yes   Location:  OS   Pain Frequency:  Intermittent      Comments:  Follow up for Corneal edema (pseudophakic keratopathy  The patient notes her left eye is tender to touch. She notes that the vision gets better during the day.  SLADE Miller 2:23 PM 01/04/2018

## 2018-01-04 NOTE — MR AVS SNAPSHOT
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      Your next 10 appointments already scheduled     Feb 05, 2018  9:15 AM CST   RETURN RETINA with Kanid Maldonado MD   Eye Clinic ([facility])    [facility]  [address]  9[address]  [facility] [address]   [phone]              Who to contact     Please call your clinic at [phone] to:    Ask questions about your health    Make or cancel appointments    Discuss your medicines    Learn about your test results    Speak to your doctor   If you have compliments or concerns about an experience at your clinic, or if you wish to file a complaint, please contact [facility] Patient Relations at [phone] or email us at [email or web address]         Additional Information About Your Visit        MyChart Information     [employer] is an electronic gateway that provides easy, online access to your medical records. With [employer], you can request a clinic appointment, read your test results, renew a prescription or communicate with your care team.     To sign up for [email or web address] visit the website at [email or web address]   You will be asked to enter the access code listed below, as well as some personal information. Please follow the directions to create your username and password.     Your access code is:  [email or web address]  Expires: 2018  4:26 PM     Your access code will  in 90 days. If you need help or a new code, please contact your [facility] or call [phone] for assistance.        Care EveryWhere ID     This is your Care EveryWhere ID. This could be used by other organizations to access your [place] medical records  [id number]         Blood Pressure from Last 3 Encounters:   No data found for BP    Weight from Last 3 Encounters:   No data found for Wt              Today, you had the following     No orders found for display       Primary Care Provider Office Phone # Fax #    M Essie Yanes [phone]       [facility] [address]  [facility] [address]        Equal Access to Services     FLAVIO DEJESUS : Hadii lisandro martinez hadasho Soomaali, waaxda luqadaha, qaybta kaalmada adeegyada, waxtheodora idiin hayjennifer medrano . So [facility] [phone].    ATENCIÓN: Si habla español, tiene a amaya disposición servicios gratuitos de asistencia lingüística. Llame al [phone].    We comply with applicable federal civil rights laws and [place] laws. We do not discriminate on the basis of race, color, national origin, age, disability, sex, sexual orientation, or gender identity.            Thank you!     Thank you for choosing EYE CLINIC  for your care. Our goal is always to provide you with excellent care. Hearing back from our patients is one way we can continue to improve our services. Please take a few minutes to complete the written survey that you may receive in the mail after your visit with us. Thank you!             Your Updated Medication List - Protect others around you: Learn how to safely use, store and throw away your medicines at www.disposemymeds.org.          This list is accurate as of: 18 11:59 PM.  Always use your most recent med list.                   Brand Name Dispense Instructions for use Diagnosis    Calcium carb-Vitamin D  500 mg [place]-200 units 500-200 MG-UNIT per tablet    OSCAL with D;Oyster Shell Calcium     Take 1 tablet by mouth 2 times daily (with meals)        CITALOPRAM HYDROBROMIDE PO           levothyroxine 25 mcg/mL Susp    SYNTHROID     Take 75 mg by mouth daily        OMEGA-3 FISH OIL PO           sodium chloride 5 % ophthalmic solution    HANDY 128    1 Bottle    Place 1 drop Into the left eye At Bedtime    Corneal edema of left eye       TRAZODONE HCL PO      Take 0.5 tablets by mouth At Bedtime        VITAMIN B 12 PO

## 2018-01-04 NOTE — MR AVS SNAPSHOT
After Visit Summary   2018    Magali Macdonald    MRN: [id number]           Patient Information     Date Of Birth          1940        Visit Information        Provider Department      2018 2:00 PM [facility] Eye Clinic        Today's Diagnoses     Corneal edema of left eye - Left Eye    -  1    Blurred vision - Left Eye           Follow-ups after your visit        Your next 10 appointments already scheduled     2018  9:15 AM CST   RETURN RETINA with Kandi Maldonado MD   Eye Clinic ([facility])    [address]  [address]  [address]  [facility] [address]   [phone]              Who to contact     Please call your clinic at [phone] to:    Ask questions about your health    Make or cancel appointments    Discuss your medicines    Learn about your test results    Speak to your doctor   If you have compliments or concerns about an experience at your clinic, or if you wish to file a complaint, please contact [facility] Patient Relations at [phone] or email us at [email or web address]         Additional Information About Your Visit        MyChart Information     [employer] is an electronic gateway that provides easy, online access to your medical records. With [employer], you can request a clinic appointment, read your test results, renew a prescription or communicate with your care team.     To sign up for [employer] visit the website at [email or web address]   You will be asked to enter the access code listed below, as well as some personal information. Please follow the directions to create your username and password.     Your access code is: [email or web address]  Expires: 2018  4:26 PM     Your access code will  in 90 days. If you need help or a new code, please contact your [facility] or call [phone] for assistance.        Care EveryWhere ID     This is  your Care EveryWhere ID. This could be used by other organizations to access your [place] medical records  [id number]         Blood Pressure from Last 3 Encounters:   No data found for BP    Weight from Last 3 Encounters:   No data found for Wt              Today, you had the following     No orders found for display       Primary Care Provider Office Phone # Fax #    JESÚS Yanes [phone]       [facility] [address]  [facility] [address]        Equal Access to Services     FLAVIO DEJESUS : Hadii aad ku hadasho Soomaali, waaxda luqadaha, qaybta kaalmada adeegyada, waxay idiin hayaan adeeg kharabecky ladariana . So [facility] [phone].    ATENCIÓN: Si habla español, tiene a amaya disposición servicios gratuitos de asistencia lingüística. [facility] [phone].    We comply with applicable federal civil rights laws and [place] laws. We do not discriminate on the basis of race, color, national origin, age, disability, sex, sexual orientation, or gender identity.            Thank you!     Thank you for choosing EYE CLINIC  for your care. Our goal is always to provide you with excellent care. Hearing back from our patients is one way we can continue to improve our services. Please take a few minutes to complete the written survey that you may receive in the mail after your visit with us. Thank you!             Your Updated Medication List - Protect others around you: Learn how to safely use, store and throw away your medicines at www.disposemymeds.org.          This list is accurate as of: 1/4/18 11:59 PM.  Always use your most recent med list.                   Brand Name Dispense Instructions for use Diagnosis    Calcium carb-Vitamin D 500 mg [place]-200 units 500-200 MG-UNIT per tablet    OSCAL with D;Oyster Shell Calcium     Take 1 tablet by mouth 2 times daily (with meals)        CITALOPRAM HYDROBROMIDE PO           levothyroxine 25 mcg/mL Susp    SYNTHROID     Take 75 mg by mouth daily         OMEGA-3 FISH OIL PO           sodium chloride 5 % ophthalmic solution    HANDY 128    1 Bottle    Place 1 drop Into the left eye At Bedtime    Corneal edema of left eye       TRAZODONE HCL PO      Take 0.5 tablets by mouth At Bedtime        VITAMIN B 12 PO

## 2018-01-04 NOTE — PROGRESS NOTES
A/P  1.) Aniseikonia/Anisometropia OU  -Flat fit with current lens, suspect cornea has warped slightly as previously was doing well with parameters  -Refit in steeper base curve  -Pt prefers mid-range correction over full distance minus Rx. Will order new CL underminused  -Order lens, mail to pt    2.) Corneal edema OS  -Following with Dr. Canchola  -Rec no CL wear left eye until resolved    RTC prn for CL recheck right eye, otherwise 1 year routine    I have confirmed the patient's CC, HPI and reviewed Past Medical History, Past Surgical History, Social History, Family History, Problem List, Medication List and agree with Tech note.     Geeta Coker, OD FAAO

## 2018-02-05 ENCOUNTER — OFFICE VISIT (OUTPATIENT)
Dept: OPHTHALMOLOGY | Facility: CLINIC | Age: 78
End: 2018-02-05
Attending: OPHTHALMOLOGY
Payer: COMMERCIAL

## 2018-02-05 DIAGNOSIS — H35.352 CYSTOID MACULAR DEGENERATION OF LEFT EYE: Primary | ICD-10-CM

## 2018-02-05 DIAGNOSIS — H35.373 EPIRETINAL MEMBRANE (ERM), BILATERAL: ICD-10-CM

## 2018-02-05 PROCEDURE — 92134 CPTRZ OPH DX IMG PST SGM RTA: CPT | Mod: ZF | Performed by: OPHTHALMOLOGY

## 2018-02-05 PROCEDURE — G0463 HOSPITAL OUTPT CLINIC VISIT: HCPCS | Mod: ZF

## 2018-02-05 ASSESSMENT — EXTERNAL EXAM - RIGHT EYE: OD_EXAM: NORMAL

## 2018-02-05 ASSESSMENT — TONOMETRY
OD_IOP_MMHG: RGP
OS_IOP_MMHG: 12
IOP_METHOD: TONOPEN

## 2018-02-05 ASSESSMENT — VISUAL ACUITY
OD_PH_CC: 20/50
CORRECTION_TYPE: CONTACTS
METHOD: SNELLEN - LINEAR
OS_SC: 20/150

## 2018-02-05 ASSESSMENT — CUP TO DISC RATIO
OD_RATIO: 0.2
OS_RATIO: 0.1

## 2018-02-05 ASSESSMENT — EXTERNAL EXAM - LEFT EYE: OS_EXAM: NORMAL

## 2018-02-05 ASSESSMENT — SLIT LAMP EXAM - LIDS
COMMENTS: NORMAL
COMMENTS: NORMAL

## 2018-02-05 NOTE — MR AVS SNAPSHOT
After Visit Summary   2018    Magali Macdonald    MRN: [id number]           Patient Information     Date Of Birth          1940        Visit Information        Provider Department      2018 9:15 AM Kandi Maldonado MD Eye Clinic        Today's Diagnoses     Cystoid macular degeneration of left eye    -  1    Epiretinal membrane (ERM), bilateral           Follow-ups after your visit        Follow-up notes from your care team     Return in about 4 months (around 2018) for OCT OU.      Who to contact     Please call your clinic at [phone] to:    Ask questions about your health    Make or cancel appointments    Discuss your medicines    Learn about your test results    Speak to your doctor   If you have compliments or concerns about an experience at your clinic, or if you wish to file a complaint, please contact [facility] Patient Relations at [phone] or email us at [email or web address]         Additional Information About Your Visit        MyChart Information     [employer] is an electronic gateway that provides easy, online access to your medical records. With [employer], you can request a clinic appointment, read your test results, renew a prescription or communicate with your care team.     To sign up for [employer] visit the website at [email or web address]   You will be asked to enter the access code listed below, as well as some personal information. Please follow the directions to create your username and password.     Your access code is: [email or web address]  Expires: 2018  6:30 AM     Your access code will  in 90 days. If you need help or a new code, please contact your [facility] or call [phone] for assistance.        Care EveryWhere ID     This is your Care EveryWhere ID. This could be used by other organizations to access your [place] medical records  [id number]         Blood Pressure  from Last 3 Encounters:   No data found for BP    Weight from Last 3 Encounters:   No data found for Wt              We Performed the Following     OCT Retina Spectralis OU (both eyes)        Primary Care Provider Office Phone # Fax #    JESÚS Yanes [phone]       [facility] [address]   [place]  [place] [address]        Equal Access to Services     [facility]: Hadii aad ku hadasho Soomaali, waaxda luqadaha, qaybta kaalmada adeegyada, waxay idiin hayaan adeeg kharash la'yaeln . So [facility] [phone].    ATENCIÓN: Si habla eli, tiene a amaya disposición servicios gratuitos de asistencia lingüística. Crystal al [phone].    We comply with applicable federal civil rights laws and [place] laws. We do not discriminate on the basis of race, color, national origin, age, disability, sex, sexual orientation, or gender identity.            Thank you!     Thank you for choosing EYE CLINIC  for your care. Our goal is always to provide you with excellent care. Hearing back from our patients is one way we can continue to improve our services. Please take a few minutes to complete the written survey that you may receive in the mail after your visit with us. Thank you!             Your Updated Medication List - Protect others around you: Learn how to safely use, store and throw away your medicines at www.disposemymeds.org.          This list is accurate as of 2/5/18  9:08 PM.  Always use your most recent med list.                   Brand Name Dispense Instructions for use Diagnosis    Calcium carb-Vitamin D 500 mg [place]-200 units 500-200 MG-UNIT per tablet    OSCAL with D;Oyster Shell Calcium     Take 1 tablet by mouth 2 times daily (with meals)        CITALOPRAM HYDROBROMIDE PO           levothyroxine 25 mcg/mL Susp    SYNTHROID     Take 75 mg by mouth daily        OMEGA-3 FISH OIL PO           sodium chloride 5 % ophthalmic solution    HANDY 128    1 Bottle    Place 1 drop Into the left eye  At Bedtime    Corneal edema of left eye       TRAZODONE HCL PO      Take 0.5 tablets by mouth At Bedtime        VITAMIN B 12 PO

## 2018-02-05 NOTE — NURSING NOTE
Chief Complaints and History of Present Illnesses   Patient presents with     Follow Up For     Cystoid macular degeneration LE     HPI    Affected eye(s):  Left   Symptoms:        Frequency:  Intermittent, Constant       Do you have eye pain now?:  No      Comments:  Pt states VA decreased OS.  Pt says vision is worst in morning, improves slightly as day goes on.  Pt feels poor VA related to recent cornea issue.  No changes to flashes or floaters.  Neil ASHFORD Student  I have reviewed all information that was taken. Olga Lidia Spencer COT 10:37 AM February 5, 2018

## 2018-02-05 NOTE — PROGRESS NOTES
CC: CME after RD OS    INTERVAL HISTORY -   VA OS worsening since SANDRA with me.  Stopped FML 11/2017  Prior pain episodes now rare.    HPI:   76 year old here for follow up for h/o Retinal detachment  left eye and persistent cystoid macular edema    PAST OCULAR SURGERY  PPV/SBP/PPL/FGX OS 10/3/13 (DDK)   PPV/FGx OS 6/2013 (Oli)  ACIOL OS 10/15/14 (Abraham)      RETINAL IMAGING  OCT 2-5-18  OD- Epiretinal membrane with traction and distortion of fovea, stable  OS - mild Epiretinal membrane, trace CME          ASSESSMENT & PLAN      1.  Cystoid macular edema left eye   - primary etiology likely post- RD repair   - had tiny residual lens fragment noted in 2014 now likely absorbed     - previously needed gtts   - now mostly resolved off gtts since 11/2017   - mild residual not significant, observe   - recheck in 4 months      2.  H/o RD repair OS   - surgery x 2 in 2013   - retina flat   - VA 20/30 in 2015    3.  K- decompensation OS   - likely primary cause of poor vision   - would likely benefit from K-surgery to address      4.  Epiretinal membrane right eye     - VA 20/20 on 1/2018   - minimal visual symptoms, micropsia   - observe    5. ERM OS   - mild not significant   - observe    6.  PVD/syneresis OD   - S/Sx RD    7.  NS OD   - mild       8.  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  I personally obtained the chief complaint(s) and history of present illness.  I confirmed and edited as necessary the review of systems, past medical/surgical history, family history, social history, and examination findings as documented by others; and I examined the patient myself.   I personally reviewed the relevant tests, images, and reports as documented above.  I formulated and edited as necessary the assessment and plan and discussed the findings and management plan with the patient and family    Kandi Maldonado MD, PhD  , Vitreoretinal Surgery  Department of Ophthalmology  [facility]

## 2018-03-20 ENCOUNTER — TELEPHONE (OUTPATIENT)
Dept: OPHTHALMOLOGY | Facility: CLINIC | Age: 78
End: 2018-03-20

## 2018-03-20 ENCOUNTER — OFFICE VISIT (OUTPATIENT)
Dept: OPHTHALMOLOGY | Facility: CLINIC | Age: 78
End: 2018-03-20
Attending: OPHTHALMOLOGY
Payer: COMMERCIAL

## 2018-03-20 DIAGNOSIS — H18.20 CORNEAL EDEMA: ICD-10-CM

## 2018-03-20 DIAGNOSIS — H25.11 NUCLEAR SENILE CATARACT OF RIGHT EYE: Primary | ICD-10-CM

## 2018-03-20 PROCEDURE — G0463 HOSPITAL OUTPT CLINIC VISIT: HCPCS | Mod: ZF

## 2018-03-20 ASSESSMENT — REFRACTION_WEARINGRX
OS_CYLINDER: +3.00
OS_AXIS: 110
SPECS_TYPE: PAL
OD_AXIS: 035
OD_CYLINDER: +0.50
OS_SPHERE: -2.00
OD_SPHERE: -6.00
OD_ADD: +3.00

## 2018-03-20 ASSESSMENT — VISUAL ACUITY
METHOD: SNELLEN - LINEAR
OD_CC+: -1
OD_PH_CC: 20/40
CORRECTION_TYPE: CONTACTS
OS_CC: 20/100
OD_CC: 20/40
OS_CC+: +1

## 2018-03-20 ASSESSMENT — TONOMETRY
IOP_METHOD: ICARE
OS_IOP_MMHG: 22
OD_IOP_MMHG: RGP

## 2018-03-20 ASSESSMENT — EXTERNAL EXAM - RIGHT EYE: OD_EXAM: NORMAL

## 2018-03-20 ASSESSMENT — PACHYMETRY: OS_CT(UM): 849

## 2018-03-20 ASSESSMENT — SLIT LAMP EXAM - LIDS
COMMENTS: NORMAL
COMMENTS: NORMAL

## 2018-03-20 ASSESSMENT — EXTERNAL EXAM - LEFT EYE: OS_EXAM: NORMAL

## 2018-03-20 NOTE — PROGRESS NOTES
CC: Follow up for corneal edema left eye       POH  Pars plana vitrectomy (PPV)/SBP/PPL/FGx C3F8 10/3/13 for recurrent Rhegmatogenous retinal detachment and cataract  Pars plana vitrectomy OS  (PPV)/FGx 6/2013 by Oli BARRAGAN placement with Dr. Abraham to left eye on 10/15/14    Reports that morning blur has worsened  Still improves within about an hour      Gtts:  Rah daily left eye - uses in the evening  Doesn't seem to help with morning blur    Assessment/Plan:    1) Corneal edema (pseudophakic keratopathy)    - likely secondary to ACIOL, pachymetry 610 left eye (from 665)  - increase rah to Four times a day Left Eye   - counseled re findings and options  - patient would like to defer intervention for now  Reassess in 3 months with PRASANNA      2) Cystoid macular edema left eye  - saw Dr. Narayanan 11/17 and has f/up apt in Feb     3) Epiretinal membrane left eye    - very mild, not likely significant, observe    4) Pseudophakic left eye    - stable, follow up with Dr. Abraham     5) Nuclear sclerosis right eye mild          RHYS Plascencia  Cornea fellow      ~~~~~~~~~~~~~~~~~~~~~~~~~~~~~~~~~~~~~~~~~~~~~~~~~~~~~~~~~~~~~~~~    Complete documentation of historical and exam elements from today's encounter can be found in the full encounter summary report (not reduplicated in this progress note). I personally obtained the chief complaint(s) and history of present illness.  I confirmed and edited as necessary the review of systems, past medical/surgical history, family history, social history, and examination findings as documented by others.  I examined the patient myself, and I personally reviewed the relevant tests, images, and reports as documented above. I formulated and edited as necessary the assessment and plan and discussed the findings and management plan with the patient and family.     Phillip Canchola MD, MA  Director, Cornea & Anterior Segment  [facility] Department of Ophthalmology &  Visual Neuroscience

## 2018-03-20 NOTE — NURSING NOTE
Chief Complaints and History of Present Illnesses   Patient presents with     Follow Up For     2.5 month follow up Corneal edema (pseudophakic keratopathy)     HPI    Affected eye(s):  Both   Symptoms:     No floaters   No flashes   No redness   No Dryness   No itching         Do you have eye pain now?:  No      Comments:  Pt states that vision appears cloudy in LE. Pt notes that vision is worse upon waking and clears up slightly by afternoon.    Ute FINLEY March 20, 2018 1:29 PM

## 2018-03-20 NOTE — MR AVS SNAPSHOT
After Visit Summary   3/20/2018    Magali Macdonald    MRN: [id number]           Patient Information     Date Of Birth          1940        Visit Information        Provider Department      3/20/2018 1:00 PM Phillip Canchola MD Eye Clinic        Today's Diagnoses     Nuclear senile cataract of right eye    -  1       Follow-ups after your visit        Your next 10 appointments already scheduled     May 10, 2018  8:15 AM CDT   Post-Op with Phillip Canchola MD   Eye Clinic ([facility])    [address] [address]   [phone]            May 17, 2018  8:15 AM CDT   Post-Op with Phillip Canchola MD   Eye Clinic ([facility])    [address] [address]   [phone]            2018  8:15 AM CDT   Post-Op with Phillip Canchola MD   Eye Clinic ([facility])    [address] [address]   [phone]              Who to contact     Please call your clinic at [phone] to:    Ask questions about your health    Make or cancel appointments    Discuss your medicines    Learn about your test results    Speak to your doctor            Additional Information About Your Visit        MyChart Information     [employer] is an electronic gateway that provides easy, online access to your medical records. With [employer], you can request a clinic appointment, read your test results, renew a prescription or communicate with your care team.     To sign up for [employer]med [employer] visit the website at [email or web address]   You will be asked to enter the access code listed below, as well as some personal information. Please follow the directions to create your username and password.     Your access code is: [email or web address]  Expires: 2018  7:30 AM     Your access code will  in 90 days. If  you need help or a new code, please contact your [facility] or call [phone] for assistance.        Care EveryWhere ID     This is your Care EveryWhere ID. This could be used by other organizations to access your [place] medical records  [id number]         Blood Pressure from Last 3 Encounters:   No data found for BP    Weight from Last 3 Encounters:   No data found for Wt              We Performed the Following     Delmi-Operative Worksheet     Potential Acuity Meter (PRASANNA)        Primary Care Provider Office Phone # Fax #    JESÚS Yanes [phone]       [facility] [address]  [facility] [address]        Equal Access to Services     FRED DEJESUS : Hadii aad ku hadasho Soomaali, waaxda luqadaha, qaybta kaalmada adeegyada, waxay krunalin hayyaeln scott medrano . So [facility] [phone].    ATENCIÓN: Si habla español, tiene a amaya disposición servicios gratuitos de asistencia lingüística. [facility] [phone].    We comply with applicable federal civil rights laws and [place] laws. We do not discriminate on the basis of race, color, national origin, age, disability, sex, sexual orientation, or gender identity.            Thank you!     Thank you for choosing EYE CLINIC  for your care. Our goal is always to provide you with excellent care. Hearing back from our patients is one way we can continue to improve our services. Please take a few minutes to complete the written survey that you may receive in the mail after your visit with us. Thank you!             Your Updated Medication List - Protect others around you: Learn how to safely use, store and throw away your medicines at www.disposemymeds.org.          This list is accurate as of 3/20/18 11:59 PM.  Always use your most recent med list.                   Brand Name Dispense Instructions for use Diagnosis    Calcium carb-Vitamin D 500 mg [place]-200 units 500-200 MG-UNIT per tablet    OSCAL  with D;Oyster Shell Calcium     Take 1 tablet by mouth 2 times daily (with meals)        CITALOPRAM HYDROBROMIDE PO           levothyroxine 25 mcg/mL Susp    SYNTHROID     Take 75 mg by mouth daily        OMEGA-3 FISH OIL PO           sodium chloride 5 % ophthalmic solution    HANDY 128    1 Bottle    Place 1 drop Into the left eye At Bedtime    Corneal edema of left eye       TRAZODONE HCL PO      Take 0.5 tablets by mouth At Bedtime        VITAMIN B 12 PO

## 2018-05-09 ENCOUNTER — TRANSFERRED RECORDS (OUTPATIENT)
Dept: HEALTH INFORMATION MANAGEMENT | Facility: CLINIC | Age: 78
End: 2018-05-09

## 2018-05-10 ENCOUNTER — OFFICE VISIT (OUTPATIENT)
Dept: OPHTHALMOLOGY | Facility: CLINIC | Age: 78
End: 2018-05-10
Attending: OPHTHALMOLOGY
Payer: COMMERCIAL

## 2018-05-10 DIAGNOSIS — Z94.7 POST CORNEAL TRANSPLANT: Primary | ICD-10-CM

## 2018-05-10 PROCEDURE — G0463 HOSPITAL OUTPT CLINIC VISIT: HCPCS | Mod: ZF

## 2018-05-10 ASSESSMENT — VISUAL ACUITY
METHOD: SNELLEN - LINEAR
OS_SC: 8'/200
CORRECTION_TYPE: CONTACTS
OD_CC+: +2
OD_CC: 20/25

## 2018-05-10 ASSESSMENT — CONF VISUAL FIELD
OD_NORMAL: 1
OS_INFERIOR_NASAL_RESTRICTION: 1
OS_SUPERIOR_NASAL_RESTRICTION: 1

## 2018-05-10 ASSESSMENT — EXTERNAL EXAM - RIGHT EYE: OD_EXAM: NORMAL

## 2018-05-10 ASSESSMENT — TONOMETRY
OD_IOP_MMHG: X
IOP_METHOD: ICARE
OS_IOP_MMHG: 14

## 2018-05-10 ASSESSMENT — SLIT LAMP EXAM - LIDS
COMMENTS: NORMAL
COMMENTS: NORMAL

## 2018-05-10 ASSESSMENT — EXTERNAL EXAM - LEFT EYE: OS_EXAM: NORMAL

## 2018-05-10 NOTE — PROGRESS NOTES
Assessment / Plan:    Magali Macdonald is a 77 year old female who is 1 day s/p DSEK OS  Off to a good start.    Leave safety suture and bandage contact lens for now    Medications in the surgical eye:    prednisolone acetate 1% four times a day     Ofloxacin four times a day      Limit heavy lifting, bending over, and heavy exertion. Light aerobic activity is acceptable. Avoid swimming pools, hot tubs, or saunas for 3-4 weeks.   Wear the protective shield at night for the next seven days, and call for increased redness, discharge, pain, or decreased vision.    Return to clinic in approximately 1 week, earlier as needed.    Andrew Mistry, DO  Cornea Fellow    ~~~~~~~~~~~~~~~~~~~~~~~~~~~~~~~~~~~~~~~~~~~~~~~~~~~~~~~~~~~~~~~~    Complete documentation of historical and exam elements from today's encounter can be found in the full encounter summary report (not reduplicated in this progress note). I personally obtained the chief complaint(s) and history of present illness.  I confirmed and edited as necessary the review of systems, past medical/surgical history, family history, social history, and examination findings as documented by others.  I examined the patient myself, and I personally reviewed the relevant tests, images, and reports as documented above. I formulated and edited as necessary the assessment and plan and discussed the findings and management plan with the patient and family.     Phillip Canchola MD, MA  Director, Cornea & Anterior Segment  [facility] Department of Ophthalmology & Visual Neuroscience

## 2018-05-10 NOTE — MR AVS SNAPSHOT
After Visit Summary   5/10/2018    Magali Macdonald    MRN: [id number]           Patient Information     Date Of Birth          1940        Visit Information        Provider Department      5/10/2018 8:15 AM Phillip Canchola MD [facility]        Today's Diagnoses     Post corneal transplant    -  1       Follow-ups after your visit        Follow-up notes from your care team     Return in about 1 week (around 5/17/2018).      Your next 10 appointments already scheduled     Jun 07, 2018  8:15 AM CDT   Post-Op with Phillip Canchola MD   Eye Clinic ([facility])    [address]  [facility] [address] [phone]              Who to contact     Please call your clinic at [phone] to:    Ask questions about your health    Make or cancel appointments    Discuss your medicines    Learn about your test results    Speak to your doctor            Additional Information About Your Visit        MyChart Information     Isentiot gives you secure access to your electronic health record. If you see a primary care provider, you can also send messages to your care team and make appointments. If you have questions, please call your primary care clinic.  If you do not have a primary care provider, please call [phone] and they will assist you.      Nextly is an electronic gateway that provides easy, online access to your medical records. With Nextly, you can request a clinic appointment, read your test results, renew a prescription or communicate with your care team.     To access your existing account, please contact your [facility] or call [phone] for assistance.        Care EveryWhere ID     This is your Care EveryWhere ID. This could be used by other organizations to access your [place] medical records  [id number]         Blood Pressure from Last 3 Encounters:   No data found for BP    Weight from  Last 3 Encounters:   No data found for Wt              Today, you had the following     No orders found for display       Primary Care Provider Office Phone # Fax #    JESÚS Yanes [phone]       [facility] [address]   [place]  [place] [address]        Equal Access to Services     FLAVIO DEJESUS : Hadii aad ku hadasho Soomaali, waaxda luqadaha, qaybta kaalmada adeegyada, waxay krunalin hayyaeln adesusan [place]y [place]. So [facility] [phone].    ATENCIÓN: Si habla español, tiene a amaya disposición servicios gratuitos de asistencia lingüística. Crystal al [phone].    We comply with applicable federal civil rights laws and [place] laws. We do not discriminate on the basis of race, color, national origin, age, disability, sex, sexual orientation, or gender identity.            Thank you!     Thank you for choosing EYE CLINIC  for your care. Our goal is always to provide you with excellent care. Hearing back from our patients is one way we can continue to improve our services. Please take a few minutes to complete the written survey that you may receive in the mail after your visit with us. Thank you!             Your Updated Medication List - Protect others around you: Learn how to safely use, store and throw away your medicines at www.disposemymeds.org.          This list is accurate as of 5/10/18 11:59 PM.  Always use your most recent med list.                   Brand Name Dispense Instructions for use Diagnosis    Calcium carb-Vitamin D 500 mg [place]-200 units 500-200 MG-UNIT per tablet    OSCAL with D;Oyster Shell Calcium     Take 1 tablet by mouth 2 times daily (with meals)        CITALOPRAM HYDROBROMIDE PO           levothyroxine 25 mcg/mL Susp    SYNTHROID     Take 75 mg by mouth daily        OMEGA-3 FISH OIL PO           sodium chloride 5 % ophthalmic solution    HANDY 128    1 Bottle    Place 1 drop Into the left eye At Bedtime    Corneal edema of left eye       TRAZODONE HCL PO       Take 0.5 tablets by mouth At Bedtime        VITAMIN B 12 PO

## 2018-05-17 ENCOUNTER — OFFICE VISIT (OUTPATIENT)
Dept: OPHTHALMOLOGY | Facility: CLINIC | Age: 78
End: 2018-05-17
Attending: OPHTHALMOLOGY
Payer: COMMERCIAL

## 2018-05-17 DIAGNOSIS — Z94.7 POST CORNEAL TRANSPLANT: Primary | ICD-10-CM

## 2018-05-17 PROCEDURE — G0463 HOSPITAL OUTPT CLINIC VISIT: HCPCS | Mod: ZF

## 2018-05-17 ASSESSMENT — REFRACTION_WEARINGRX
OD_ADD: +3.00
OS_SPHERE: -2.00
OS_CYLINDER: +3.00
SPECS_TYPE: PAL
OD_CYLINDER: +0.50
OD_AXIS: 035
OD_SPHERE: -6.00
OS_AXIS: 110

## 2018-05-17 ASSESSMENT — VISUAL ACUITY
METHOD: SNELLEN - LINEAR
OS_SC: 20/150
CORRECTION_TYPE: CONTACTS
OD_CC: 20/25

## 2018-05-17 ASSESSMENT — SLIT LAMP EXAM - LIDS
COMMENTS: NORMAL
COMMENTS: NORMAL

## 2018-05-17 ASSESSMENT — TONOMETRY
OS_IOP_MMHG: 24
IOP_METHOD: TONOPEN
OD_IOP_MMHG: RGP

## 2018-05-17 ASSESSMENT — EXTERNAL EXAM - RIGHT EYE: OD_EXAM: NORMAL

## 2018-05-17 ASSESSMENT — EXTERNAL EXAM - LEFT EYE: OS_EXAM: NORMAL

## 2018-05-17 NOTE — NURSING NOTE
Chief Complaints and History of Present Illnesses   Patient presents with     Post Op (Ophthalmology) Left Eye     1 week s/p DSEK OS     HPI    Affected eye(s):  Both   Symptoms:     Blurred vision   No decreased vision   No floaters   No flashes   Redness   Foreign body sensation   Photophobia   Eye discharge      Duration:  1 week   Frequency:  Constant       Do you have eye pain now?:  No      Comments:  Pt. stated blurry vision that has improved over the last 1 week OS.  Curt Franklin  8:27 AM May 17, 2018

## 2018-05-17 NOTE — PROGRESS NOTES
Assessment / Plan:    Magali Macdonald is a 77 year old female who is 1 week s/p DSEK OS  Improved clarity and vision, graft in good position  Safety suture removed today  bandage contact lens removed    Medications in the surgical eye:    prednisolone acetate 1% four times a day     Ofloxacin four times a day x 4 days, then STOP    Limit heavy lifting, bending over, and heavy exertion. Light aerobic activity is acceptable. Avoid swimming pools, hot tubs, or saunas for 3-4 weeks.   Call for increased redness, discharge, pain, or decreased vision.    Return to clinic in approximately 3 weeks, earlier as needed.    Andrew Mistry, DO  Cornea Fellow      ~~~~~~~~~~~~~~~~~~~~~~~~~~~~~~~~~~~~~~~~~~~~~~~~~~~~~~~~~~~~~~~~    Complete documentation of historical and exam elements from today's encounter can be found in the full encounter summary report (not reduplicated in this progress note). I personally obtained the chief complaint(s) and history of present illness.  I confirmed and edited as necessary the review of systems, past medical/surgical history, family history, social history, and examination findings as documented by others.  I examined the patient myself, and I personally reviewed the relevant tests, images, and reports as documented above. I formulated and edited as necessary the assessment and plan and discussed the findings and management plan with the patient and family.     Phillip Canchola MD, MA  Director, Cornea & Anterior Segment  [facility] Department of Ophthalmology & Visual Neuroscience

## 2018-05-17 NOTE — MR AVS SNAPSHOT
After Visit Summary   2018    Magali Macdonald    MRN: [id number]           Patient Information     Date Of Birth          1940        Visit Information        Provider Department      2018 8:15 AM Phillip Canchola MD [facility]        Today's Diagnoses     Post corneal transplant    -  1       Follow-ups after your visit        Follow-up notes from your care team     Return in about 3 weeks (around 2018).      Your next 10 appointments already scheduled     2018  8:15 AM CDT   Post-Op with Phillip Canchola MD   Eye Clinic ([facility])    [place] [address]  [facility] [address]   [phone]              Who to contact     Please call your clinic at [phone] to:    Ask questions about your health    Make or cancel appointments    Discuss your medicines    Learn about your test results    Speak to your doctor            Additional Information About Your Visit        MyChart Information     Fatigue Sciencet is an electronic gateway that provides easy, online access to your medical records. With Applifier, you can request a clinic appointment, read your test results, renew a prescription or communicate with your care team.     To sign up for Fatigue Sciencet visit the website at [email or web address]   You will be asked to enter the access code listed below, as well as some personal information. Please follow the directions to create your username and password.     Your access code is: [email or web address]  Expires: 2018  6:30 AM     Your access code will  in 90 days. If you need help or a new code, please contact your [facility] or call [phone] for assistance.        Care EveryWhere ID     This is your Care EveryWhere ID. This could be used by other organizations to access your [place] medical records  [id number]         Blood Pressure from Last 3 Encounters:   No data found for BP     Weight from Last 3 Encounters:   No data found for Wt              Today, you had the following     No orders found for display       Primary Care Provider Office Phone # Fax #    JESÚS Yanes [phone]       [facility] [address]   [place]  [place] [address]        Equal Access to Services     FLAVIO DEJESUS : Hadii aad ku hadasho Soomaali, waaxda luqadaha, qaybta kaalmada adeegyada, waxay krunalin hayyaeln adesusan caitybecky [place]. So [facility] [phone].    ATENCIÓN: Si habla español, tiene a amaya disposición servicios gratuitos de asistencia lingüística. [facility] [phone].    We comply with applicable federal civil rights laws and [place] laws. We do not discriminate on the basis of race, color, national origin, age, disability, sex, sexual orientation, or gender identity.            Thank you!     Thank you for choosing EYE CLINIC  for your care. Our goal is always to provide you with excellent care. Hearing back from our patients is one way we can continue to improve our services. Please take a few minutes to complete the written survey that you may receive in the mail after your visit with us. Thank you!             Your Updated Medication List - Protect others around you: Learn how to safely use, store and throw away your medicines at www.disposemymeds.org.          This list is accurate as of 5/17/18  9:18 AM.  Always use your most recent med list.                   Brand Name Dispense Instructions for use Diagnosis    Calcium carb-Vitamin D 500 mg [place]-200 units 500-200 MG-UNIT per tablet    OSCAL with D;Oyster Shell Calcium     Take 1 tablet by mouth 2 times daily (with meals)        CITALOPRAM HYDROBROMIDE PO           levothyroxine 25 mcg/mL Susp    SYNTHROID     Take 75 mg by mouth daily        OMEGA-3 FISH OIL PO           sodium chloride 5 % ophthalmic solution    HANDY 128    1 Bottle    Place 1 drop Into the left eye At Bedtime    Corneal edema of left eye       TRAZODONE  HCL PO      Take 0.5 tablets by mouth At Bedtime        VITAMIN B 12 PO

## 2018-06-12 ENCOUNTER — OFFICE VISIT (OUTPATIENT)
Dept: OPHTHALMOLOGY | Facility: CLINIC | Age: 78
End: 2018-06-12
Attending: OPHTHALMOLOGY
Payer: COMMERCIAL

## 2018-06-12 DIAGNOSIS — Z94.7 POST CORNEAL TRANSPLANT: Primary | ICD-10-CM

## 2018-06-12 PROCEDURE — G0463 HOSPITAL OUTPT CLINIC VISIT: HCPCS | Mod: ZF

## 2018-06-12 RX ORDER — PREDNISOLONE ACETATE 10 MG/ML
1 SUSPENSION/ DROPS OPHTHALMIC 4 TIMES DAILY
Qty: 1 BOTTLE | Refills: 6 | Status: SHIPPED | OUTPATIENT
Start: 2018-06-12 | End: 2019-08-05

## 2018-06-12 RX ORDER — PREDNISOLONE ACETATE 10 MG/ML
1 SUSPENSION/ DROPS OPHTHALMIC 4 TIMES DAILY
COMMUNITY
Start: 2018-05-09 | End: 2018-06-12

## 2018-06-12 ASSESSMENT — TONOMETRY
OD_IOP_MMHG: RGP
IOP_METHOD: ICARE
OS_IOP_MMHG: 22

## 2018-06-12 ASSESSMENT — SLIT LAMP EXAM - LIDS
COMMENTS: NORMAL
COMMENTS: NORMAL

## 2018-06-12 ASSESSMENT — REFRACTION_WEARINGRX
OD_CYLINDER: +0.50
OD_ADD: +3.00
OS_SPHERE: -2.00
SPECS_TYPE: PAL
OS_CYLINDER: +3.00
OD_AXIS: 035
OD_SPHERE: -6.00
OS_AXIS: 110

## 2018-06-12 ASSESSMENT — VISUAL ACUITY
OD_PH_CC: 20/30+2
OD_CC+: -2
CORRECTION_TYPE: CONTACTS
OS_SC: 20/100
METHOD: SNELLEN - LINEAR
OD_CC: 20/30
OS_SC+: -1

## 2018-06-12 ASSESSMENT — EXTERNAL EXAM - LEFT EYE: OS_EXAM: NORMAL

## 2018-06-12 ASSESSMENT — EXTERNAL EXAM - RIGHT EYE: OD_EXAM: NORMAL

## 2018-06-12 ASSESSMENT — CONF VISUAL FIELD: OD_INFERIOR_TEMPORAL_RESTRICTION: 3

## 2018-06-12 NOTE — MR AVS SNAPSHOT
After Visit Summary   6/12/2018    Magali Macdonald    MRN: [id number]           Patient Information     Date Of Birth          1940        Visit Information        Provider Department      6/12/2018 8:15 AM Phillip Canchola MD [facility]        Today's Diagnoses     Post corneal transplant    -  1       Follow-ups after your visit        Follow-up notes from your care team     Return in about 2 months (around 8/12/2018).      Who to contact     Please call your clinic at [phone] to:    Ask questions about your health    Make or cancel appointments    Discuss your medicines    Learn about your test results    Speak to your doctor            Additional Information About Your Visit        Jetaporthart Information     [employer] Software gives you secure access to your electronic health record. If you see a primary care provider, you can also send messages to your care team and make appointments. If you have questions, please call your primary care clinic.  If you do not have a primary care provider, please call [phone] and they will assist you.      [employer] is an electronic gateway that provides easy, online access to your medical records. With [employer] Software, you can request a clinic appointment, read your test results, renew a prescription or communicate with your care team.     To access your existing account, please contact your [facility] or call [phone] for assistance.        Care EveryWhere ID     This is your Care EveryWhere ID. This could be used by other organizations to access your [place] medical records  [id number]         Blood Pressure from Last 3 Encounters:   No data found for BP    Weight from Last 3 Encounters:   No data found for Wt              Today, you had the following     No orders found for display         Where to get your medicines      These medications were sent to [address], [place] [address]     Phone:  [phone]     prednisoLONE acetate 1 % ophthalmic susp          Primary Care Provider Office Phone # Fax #    JESÚS Yanes [phone]       [facility] [address]  [place]  [place] [address]        Equal Access to Services     FLAVIO DEJESUS : Hadii aad ku hadasho Soomaali, waaxda luqadaha, qaybta kaalmada adeegyada, laxmi talley hayyaeln adesusan caitybecky medrano . So [facility] [phone].    ATENCIÓN: Si habla español, tiene a amaya disposición servicios gratuitos de asistencia lingüística. Crystal al [phone].    We comply with applicable federal civil rights laws and [place] laws. We do not discriminate on the basis of race, color, national origin, age, disability, sex, sexual orientation, or gender identity.            Thank you!     Thank you for choosing EYE CLINIC  for your care. Our goal is always to provide you with excellent care. Hearing back from our patients is one way we can continue to improve our services. Please take a few minutes to complete the written survey that you may receive in the mail after your visit with us. Thank you!             Your Updated Medication List - Protect others around you: Learn how to safely use, store and throw away your medicines at www.disposemymeds.org.          This list is accurate as of 6/12/18  9:33 AM.  Always use your most recent med list.                   Brand Name Dispense Instructions for use Diagnosis    Calcium carb-Vitamin D 500 mg [place]-200 units 500-200 MG-UNIT per tablet    OSCAL with D;Oyster Shell Calcium     Take 1 tablet by mouth 2 times daily (with meals)        CITALOPRAM HYDROBROMIDE PO           levothyroxine 25 mcg/mL Susp    SYNTHROID     Take 75 mg by mouth daily        OMEGA-3 FISH OIL PO           prednisoLONE acetate 1 % ophthalmic susp    PRED FORTE    1 Bottle    Place 1 drop Into the left eye 4 times daily    Post corneal transplant       TRAZODONE HCL PO      Take 0.5  tablets by mouth At Bedtime        VITAMIN B 12 PO

## 2018-06-12 NOTE — NURSING NOTE
Chief Complaints and History of Present Illnesses   Patient presents with     Follow Up For     1 month follow up s/p DSEK OS     HPI    Affected eye(s):  Left   Symptoms:     No floaters   No flashes   No redness   No tearing   No Dryness   No itching         Do you have eye pain now?:  No      Comments:  Pt states vision is the same as last visit. No eye pain today.     Ute FINLEY June 12, 2018 8:42 AM

## 2018-06-12 NOTE — PROGRESS NOTES
Assessment / Plan:    Magali Macdonald is a 77 year old female who is 1 month s/p DSEK OS  Improved clarity and vision, graft in good position    Medications in the surgical eye:    Continue prednisolone acetate 1% three times a day      Return to clinic in approximately 2 months with MRx  Suture removal    Andrew Mistry, DO  Cornea Fellow      ~~~~~~~~~~~~~~~~~~~~~~~~~~~~~~~~~~~~~~~~~~~~~~~~~~~~~~~~~~~~~~~~    Complete documentation of historical and exam elements from today's encounter can be found in the full encounter summary report (not reduplicated in this progress note). I personally obtained the chief complaint(s) and history of present illness.  I confirmed and edited as necessary the review of systems, past medical/surgical history, family history, social history, and examination findings as documented by others.  I examined the patient myself, and I personally reviewed the relevant tests, images, and reports as documented above. I formulated and edited as necessary the assessment and plan and discussed the findings and management plan with the patient and family.     Phillip Canchola MD, MA  Director, Cornea & Anterior Segment  [facility] Department of Ophthalmology & Visual Neuroscience

## 2018-10-01 DIAGNOSIS — Z94.7 POST CORNEAL TRANSPLANT: Primary | ICD-10-CM

## 2018-10-02 ENCOUNTER — OFFICE VISIT (OUTPATIENT)
Dept: OPHTHALMOLOGY | Facility: CLINIC | Age: 78
End: 2018-10-02
Attending: OPHTHALMOLOGY
Payer: COMMERCIAL

## 2018-10-02 DIAGNOSIS — H18.20 CORNEAL EDEMA: Primary | ICD-10-CM

## 2018-10-02 DIAGNOSIS — H35.352 CYSTOID MACULAR DEGENERATION OF LEFT EYE: ICD-10-CM

## 2018-10-02 DIAGNOSIS — Z94.7 POST CORNEAL TRANSPLANT: ICD-10-CM

## 2018-10-02 PROCEDURE — 92015 DETERMINE REFRACTIVE STATE: CPT | Mod: 52,ZF

## 2018-10-02 PROCEDURE — 92025 CPTRIZED CORNEAL TOPOGRAPHY: CPT | Mod: ZF | Performed by: OPHTHALMOLOGY

## 2018-10-02 PROCEDURE — G0463 HOSPITAL OUTPT CLINIC VISIT: HCPCS | Mod: ZF

## 2018-10-02 ASSESSMENT — REFRACTION_MANIFEST
OS_AXIS: 100
OS_SPHERE: +0.25
OS_ADD: +2.75
OS_CYLINDER: +0.75

## 2018-10-02 ASSESSMENT — CONF VISUAL FIELD
OD_NORMAL: 1
METHOD: COUNTING FINGERS
OS_NORMAL: 1

## 2018-10-02 ASSESSMENT — VISUAL ACUITY
METHOD: SNELLEN - LINEAR
CORRECTION_TYPE: CONTACTS
OS_PH_SC: 20/40-1
OD_CC+: -1
OS_SC: 20/80
OS_SC+: -1
OD_CC: 20/30

## 2018-10-02 ASSESSMENT — TONOMETRY
IOP_METHOD: ICARE
OD_IOP_MMHG: RGP
OS_IOP_MMHG: 27

## 2018-10-02 ASSESSMENT — SLIT LAMP EXAM - LIDS
COMMENTS: NORMAL
COMMENTS: NORMAL

## 2018-10-02 ASSESSMENT — EXTERNAL EXAM - LEFT EYE: OS_EXAM: NORMAL

## 2018-10-02 ASSESSMENT — EXTERNAL EXAM - RIGHT EYE: OD_EXAM: NORMAL

## 2018-10-02 NOTE — PROGRESS NOTES
Assessment / Plan:    Magali Macdonald is a 78 year old female who is ~4 month s/p DSEK OS  Graft in good position    Suture removal today  Ofloxacin four times a day  X 4 days    Reduce prednisolone to twice a day     See Dr. UP 1 month manifest refraction both eyes / contact lens fitting     History of chronic cystoid macular edema - see James in the next 2 mo    Viral Bell M.D.  PGY-3, Ophthalmology       ~~~~~~~~~~~~~~~~~~~~~~~~~~~~~~~~~~~~~~~~~~~~~~~~~~~~~~~~~~~~~~~~    Complete documentation of historical and exam elements from today's encounter can be found in the full encounter summary report (not reduplicated in this progress note). I personally obtained the chief complaint(s) and history of present illness.  I confirmed and edited as necessary the review of systems, past medical/surgical history, family history, social history, and examination findings as documented by others.  I examined the patient myself, and I personally reviewed the relevant tests, images, and reports as documented above. I formulated and edited as necessary the assessment and plan and discussed the findings and management plan with the patient and family.     I personally interpreted the diagnostic / imaging study and have edited the interpretation as needed.    Phillip Canchola MD, MA  Director, Cornea & Anterior Segment  [facility] Department of Ophthalmology & Visual Neuroscience

## 2018-10-02 NOTE — NURSING NOTE
Chief Complaints and History of Present Illnesses   Patient presents with     Follow Up For     4 month s/p DSEK OS     HPI    Affected eye(s):  Left   Symptoms:     No floaters   No flashes   No redness   No tearing   No Dryness         Do you have eye pain now?:  No      Comments:  Pt here for a 4 month f/u s/p DSEK LE. Pt states vision has been the same in BE since last visit 2 months ago. Pt states she has not had a contact lens in LE, pt only wears RGP in RE only.     Argelia Lopez, TUSHAR 1:07 PM October 2, 2018

## 2018-10-02 NOTE — MR AVS SNAPSHOT
After Visit Summary   10/2/2018    Magali Macdonald    MRN: [id number]           Patient Information     Date Of Birth          1940        Visit Information        Provider Department      10/2/2018 1:00 PM Phillip Canchola MD [facility]        Today's Diagnoses     Corneal edema    -  1    Post corneal transplant        Cystoid macular degeneration of left eye           Follow-ups after your visit        Who to contact     Please call your clinic at [phone] to:    Ask questions about your health    Make or cancel appointments    Discuss your medicines    Learn about your test results    Speak to your doctor            Additional Information About Your Visit        MyChart Information     Contractually gives you secure access to your electronic health record. If you see a primary care provider, you can also send messages to your care team and make appointments. If you have questions, please call your primary care clinic.  If you do not have a primary care provider, please call [phone] and they will assist you.      Contractually is an electronic gateway that provides easy, online access to your medical records. With Contractually, you can request a clinic appointment, read your test results, renew a prescription or communicate with your care team.     To access your existing account, please contact your [facility] or call [phone] for assistance.        Care EveryWhere ID     This is your Care EveryWhere ID. This could be used by other organizations to access your [place] medical records  [id number]         Blood Pressure from Last 3 Encounters:   No data found for BP    Weight from Last 3 Encounters:   No data found for Wt              We Performed the Following     Corneal Topography OS (left eye)        Primary Care Provider Office Phone # Fax #    M Essie Yanes [phone]       [facility] [address] [facility] [address]         Equal Access to Services     [facility] : Hadii aad ku hadjooany Negarshantal, wajessieda irenamaiteha, qaeligio marthayolalaxmi zamora. So [facility] [phone].    ATENCIÓN: Si habla mcañol, tiene a amaya disposición servicios gratuitos de asistencia lingüística. Crystal al [phone].    We comply with applicable federal civil rights laws and [place] laws. We do not discriminate on the basis of race, color, national origin, age, disability, sex, sexual orientation, or gender identity.            Thank you!     Thank you for choosing EYE CLINIC  for your care. Our goal is always to provide you with excellent care. Hearing back from our patients is one way we can continue to improve our services. Please take a few minutes to complete the written survey that you may receive in the mail after your visit with us. Thank you!             Your Updated Medication List - Protect others around you: Learn how to safely use, store and throw away your medicines at www.disposemymeds.org.          This list is accurate as of 10/2/18  2:06 PM.  Always use your most recent med list.                   Brand Name Dispense Instructions for use Diagnosis    calcium carbonate 500 mg-vitamin D 200 units 500-200 MG-UNIT per tablet    OSCAL with D;OYSTER SHELL CALCIUM     Take 1 tablet by mouth 2 times daily (with meals)        CITALOPRAM HYDROBROMIDE PO           levothyroxine 25 mcg/mL Susp    SYNTHROID     Take 75 mg by mouth daily        OMEGA-3 FISH OIL PO           prednisoLONE acetate 1 % ophthalmic susp    PRED FORTE    1 Bottle    Place 1 drop Into the left eye 4 times daily    Post corneal transplant       TRAZODONE HCL PO      Take 0.5 tablets by mouth At Bedtime        VITAMIN B 12 PO

## 2019-03-14 ENCOUNTER — OFFICE VISIT (OUTPATIENT)
Dept: OPHTHALMOLOGY | Facility: CLINIC | Age: 79
End: 2019-03-14
Attending: OPHTHALMOLOGY
Payer: COMMERCIAL

## 2019-03-14 DIAGNOSIS — Z94.7 POST CORNEAL TRANSPLANT: Primary | ICD-10-CM

## 2019-03-14 DIAGNOSIS — H18.20 CORNEAL EDEMA: ICD-10-CM

## 2019-03-14 PROCEDURE — G0463 HOSPITAL OUTPT CLINIC VISIT: HCPCS | Mod: ZF

## 2019-03-14 RX ORDER — SILICONE ADHESIVE 1.5" X 3"
1 SHEET (EA) TOPICAL
Qty: 1 BOTTLE | Refills: 1 | Status: SHIPPED | OUTPATIENT
Start: 2019-03-14 | End: 2019-06-12

## 2019-03-14 ASSESSMENT — VISUAL ACUITY
METHOD: SNELLEN - LINEAR
CORRECTION_TYPE: CONTACTS
OS_SC: 20/100
OD_CC: 20/30-/+2

## 2019-03-14 ASSESSMENT — SLIT LAMP EXAM - LIDS
COMMENTS: NORMAL
COMMENTS: NORMAL

## 2019-03-14 ASSESSMENT — PACHYMETRY: OS_CT(UM): 849

## 2019-03-14 ASSESSMENT — CONF VISUAL FIELD
METHOD: COUNTING FINGERS
OS_NORMAL: 1
OD_NORMAL: 1

## 2019-03-14 ASSESSMENT — EXTERNAL EXAM - LEFT EYE: OS_EXAM: NORMAL

## 2019-03-14 ASSESSMENT — TONOMETRY
OS_IOP_MMHG: 18
IOP_METHOD: ICARE
OD_IOP_MMHG: RGP

## 2019-03-14 ASSESSMENT — EXTERNAL EXAM - RIGHT EYE: OD_EXAM: NORMAL

## 2019-03-14 NOTE — PROGRESS NOTES
Magali Macdonald is a 78 year old female s/p DSEK OS 5/9/18      Gtts:  Pred Forte BID OS    POH  ACIOL OS Dr. Abraham 10/15/14  Pars plana vitrectomy (PPV)/SBP/PPL/FGx OS 10/3/13 for recurrent RRD and cataract  Pars plana vitrectomy OS  (PPV)/FGx 6/2013 by Oli      Assessment & Plan    1. H/o DSEK OS 5/9/18  - 2/2 ACIOL likely  - Graft failure; edematous cornea today. Pachy 865 today 3/14/19 (849 pre-op 3/2018)  - Increase Pred forte OS Q3H while awake  - Start Pietro gtts OS Q3H while awake  - If no improvement with drops recommend ACIOL explant with Yamane intraocular lens vs iris fixation and repeat DSEK    2. H/o CME OS  - History of chronic cystoid macular edema  - Return to Dr. Maldonado for f/u    3. ERM OU    4. Pseudophakia OS    5. NS OD    6. ON drusen    7. Refractive error OD   - Satsified with RGP      Return 3-4 weeks, alejandro Hooks M.D.  PGY-3, Ophthalmology      ~~~~~~~~~~~~~~~~~~~~~~~~~~~~~~~~~~~~~~~~~~~~~~~~~~~~~~~~~~~~~~~~    Complete documentation of historical and exam elements from today's encounter can be found in the full encounter summary report (not reduplicated in this progress note). I personally obtained the chief complaint(s) and history of present illness.  I confirmed and edited as necessary the review of systems, past medical/surgical history, family history, social history, and examination findings as documented by others.  I examined the patient myself, and I personally reviewed the relevant tests, images, and reports as documented above. I formulated and edited as necessary the assessment and plan and discussed the findings and management plan with the patient and family.     I personally interpreted the diagnostic / imaging study and have edited the interpretation as needed.    Phillip Canchola MD, MA  Director, Cornea & Anterior Segment  [facility] Department of Ophthalmology & Visual Neuroscience

## 2019-04-23 ENCOUNTER — OFFICE VISIT (OUTPATIENT)
Dept: OPHTHALMOLOGY | Facility: CLINIC | Age: 79
End: 2019-04-23
Attending: OPHTHALMOLOGY
Payer: COMMERCIAL

## 2019-04-23 DIAGNOSIS — H18.20 CORNEAL EDEMA OF LEFT EYE: ICD-10-CM

## 2019-04-23 DIAGNOSIS — H25.11 NUCLEAR SENILE CATARACT OF RIGHT EYE: ICD-10-CM

## 2019-04-23 DIAGNOSIS — Z94.7 POST CORNEAL TRANSPLANT: Primary | ICD-10-CM

## 2019-04-23 DIAGNOSIS — Z96.1 PSEUDOPHAKIA, LEFT EYE: ICD-10-CM

## 2019-04-23 PROCEDURE — G0463 HOSPITAL OUTPT CLINIC VISIT: HCPCS | Mod: ZF

## 2019-04-23 PROCEDURE — 76519 ECHO EXAM OF EYE: CPT | Mod: ZF | Performed by: OPHTHALMOLOGY

## 2019-04-23 PROCEDURE — 76514 ECHO EXAM OF EYE THICKNESS: CPT | Mod: ZF | Performed by: OPHTHALMOLOGY

## 2019-04-23 ASSESSMENT — CONF VISUAL FIELD
OS_NORMAL: 1
OD_NORMAL: 1
METHOD: COUNTING FINGERS

## 2019-04-23 ASSESSMENT — VISUAL ACUITY
METHOD: SNELLEN - LINEAR
OD_CC+: +3
CORRECTION_TYPE: CONTACTS
OS_SC: 20/250
OD_CC: 20/30

## 2019-04-23 ASSESSMENT — EXTERNAL EXAM - LEFT EYE: OS_EXAM: NORMAL

## 2019-04-23 ASSESSMENT — TONOMETRY
IOP_METHOD: TONOPEN
OD_IOP_MMHG: RGP
OS_IOP_MMHG: 21

## 2019-04-23 ASSESSMENT — SLIT LAMP EXAM - LIDS
COMMENTS: NORMAL
COMMENTS: NORMAL

## 2019-04-23 ASSESSMENT — PACHYMETRY: OS_CT(UM): 860

## 2019-04-23 ASSESSMENT — EXTERNAL EXAM - RIGHT EYE: OD_EXAM: NORMAL

## 2019-04-23 NOTE — NURSING NOTE
Chief Complaints and History of Present Illnesses   Patient presents with     Cornea Transplant Follow Up      Chief Complaint(s) and History of Present Illness(es)     Cornea Transplant Follow Up     Laterality: left eye              Comments     Follow up of s/p DSEK OS 5/9/18.  No changes in vision of either eye within the last month. 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  She increased the pred forte to every 3-4 hours.  Was unable to get Pietro gtts.  Eye meds: PF left eye every 3-4 hours  JADON Cook 4/23/2019 8:07 AM

## 2019-04-23 NOTE — PROGRESS NOTES
Magali Macdonald is a 78 year old female s/p DSEK OS 5/9/18. Here for 1 month follow up. Suspected to have graft failure last visit and told to increase pred and start Pietro. Unable to obtain the Pietro drops. Overall comfortable with no pain. Vision is getting worse left eye with significant blurriness in the AM then slowly improving vision by 8-9pm at night.    Gtts:  Pred Forte QID left eye  Unable to get Pietro drops    POH  ACIOL OS Dr. Abraham 10/15/14  Pars plana vitrectomy (PPV)/SBP/PPL/FGx OS 10/3/13 for recurrent RRD and cataract  Pars plana vitrectomy OS  (PPV)/FGx 6/2013 by Oli      Assessment & Plan    1. H/o DSEK OS 5/9/18  - 2/2 ACIOL likely  - Graft failure; edematous cornea persists  - pachy 849 --> 860 today  - no bullae but diffuse PEE  - would likely benefit from ACIOL explant with Yamane intraocular lens vs iris fixation and repeat DSEK    2. H/o CME OS  - History of chronic cystoid macular edema  - Return to Dr. Maldonado for f/u    3. ERM OU    4. Pseudophakia left eye  - plan for AC/IOL explant during surgery    5. NS right eye  - 20/30+; pt not bothered by changes in vision; plan to stabilize left eye first    6. ON drusen    7. Refractive error OD   - Satsified with RGP    Surgical planning. IOL calcs from 2014 on [facility]. Will need repeat calcs today.   Patient to call [facility] to schedule if she decides to proceed.  rbb  90 minutes    Nicole Holden MD  PGY-5, Cornea Fellow  Ophthalmology      Complete documentation of historical and exam elements from today's encounter can be found in the full encounter summary report (not reduplicated in this progress note). I personally obtained the chief complaint(s) and history of present illness.  I confirmed and edited as necessary the review of systems, past medical/surgical history, family history, social history, and examination findings as documented by others.  I examined the patient myself, and I personally reviewed the relevant tests, images,  and reports as documented above. I formulated and edited as necessary the assessment and plan and discussed the findings and management plan with the patient and family.     I personally interpreted the diagnostic / imaging study and have edited the interpretation as needed.    Phillip Canchola MD, MA  Director, Cornea & Anterior Segment  [facility] Department of Ophthalmology & Visual Neuroscience

## 2019-05-30 ENCOUNTER — TELEPHONE (OUTPATIENT)
Dept: OPHTHALMOLOGY | Facility: CLINIC | Age: 79
End: 2019-05-30

## 2019-05-30 NOTE — TELEPHONE ENCOUNTER
Called and spoke with Magali. She wants to proceed with ACIOL explant/Yamane/DSAEK of the left eye. She already has measurements. Delmi-op worksheet already submitted.     Note to Jazzy to discuss potential surgical dates with Dr. Rudd.     Nicole Holden MD  PGY-5, Cornea Fellow  Ophthalmology

## 2019-06-03 ENCOUNTER — TELEPHONE (OUTPATIENT)
Dept: OPHTHALMOLOGY | Facility: CLINIC | Age: 79
End: 2019-06-03

## 2019-06-03 NOTE — TELEPHONE ENCOUNTER
Called patient to schedule procedure with Dr. Rudd, there was no answer. Left message with my direct line [phone].

## 2019-06-05 ENCOUNTER — TELEPHONE (OUTPATIENT)
Dept: OPHTHALMOLOGY | Facility: CLINIC | Age: 79
End: 2019-06-05

## 2019-06-05 NOTE — TELEPHONE ENCOUNTER
Patient is scheduled for surgery with Dr. Rudd      Spoke or left message with: patient    Date of Surgery: 6/25/19    Location: [facility]    Informed patient they will need an adult  Yes    Pre-op with surgeon (if applicable): MARY    H&P: Scheduled with Dr. Essie Yanes patient will call and schedule appointment.    Additional imaging/appointments: post op appointments scheduled.    Surgery packet: mailed to patient 6/5/19    Additional comments: Advised need adult surgery day and 1 day post op. Ordered cornea from [facility].

## 2019-06-11 ENCOUNTER — TELEPHONE (OUTPATIENT)
Dept: OPHTHALMOLOGY | Facility: CLINIC | Age: 79
End: 2019-06-11

## 2019-06-11 NOTE — TELEPHONE ENCOUNTER
With Dr. Canchola leaving practice pt now scheduled for DSEAK with Dr. Rudd June 25th    Pt would like to see Dr. Rudd Prior to surgery per request    Note to facilitator for assistance with pt request-- no open appt slots next week    Left message message was forwarded for assistance and my direct number if needed   Jason Ortiz RN 11:48 AM 06/11/19            [facility] Call Center    Phone Message    May a detailed message be left on voicemail: yes    Reason for Call: Other: Pt would like to set up a meet & greet with provider before procedure. Please call Pt back. Thank you.     Action Taken: Message routed to:  [facility]

## 2019-06-11 NOTE — TELEPHONE ENCOUNTER
SWP today to ask if she could come in and meet Dr Rudd at 8am as there was an opening; she agreed and I let her know that it may take 1-1.5 hrs as she had other plans mauron 9/9:30, but stated that she should be fine and will call her other plans to let them know she maybe late or nto coming at all.    Alesia Navarrete COA 3:14 PM June 11, 2019

## 2019-06-12 ENCOUNTER — OFFICE VISIT (OUTPATIENT)
Dept: OPHTHALMOLOGY | Facility: CLINIC | Age: 79
End: 2019-06-12
Attending: OPHTHALMOLOGY
Payer: COMMERCIAL

## 2019-06-12 DIAGNOSIS — T86.8419 FAILED CORNEAL TRANSPLANT: ICD-10-CM

## 2019-06-12 DIAGNOSIS — H18.12 BULLOUS KERATOPATHY OF LEFT EYE: ICD-10-CM

## 2019-06-12 DIAGNOSIS — Z96.1 PSEUDOPHAKIA, LEFT EYE: ICD-10-CM

## 2019-06-12 DIAGNOSIS — Z94.7 POST CORNEAL TRANSPLANT: Primary | ICD-10-CM

## 2019-06-12 PROCEDURE — G0463 HOSPITAL OUTPT CLINIC VISIT: HCPCS | Mod: ZF

## 2019-06-12 ASSESSMENT — VISUAL ACUITY
OD_CC+: +2
OD_PH_CC+: -2
OS_PH_SC: 20/125
OS_SC: 20/200
METHOD: SNELLEN - LINEAR
OD_CC: 20/40
CORRECTION_TYPE: CONTACTS
OD_PH_CC: 20/30

## 2019-06-12 ASSESSMENT — EXTERNAL EXAM - LEFT EYE: OS_EXAM: NORMAL

## 2019-06-12 ASSESSMENT — REFRACTION_WEARINGRX
OS_CYLINDER: +3.00
OD_AXIS: 035
OD_CYLINDER: +0.50
OD_SPHERE: -6.00
OD_ADD: +3.00
OS_SPHERE: -2.00
OS_AXIS: 110
SPECS_TYPE: PAL

## 2019-06-12 ASSESSMENT — CONF VISUAL FIELD
METHOD: COUNTING FINGERS
OS_NORMAL: 1
OD_NORMAL: 1

## 2019-06-12 ASSESSMENT — TONOMETRY
IOP_METHOD: ICARE
OD_IOP_MMHG: RGP
OS_IOP_MMHG: 19

## 2019-06-12 ASSESSMENT — SLIT LAMP EXAM - LIDS
COMMENTS: NORMAL
COMMENTS: NORMAL

## 2019-06-12 ASSESSMENT — EXTERNAL EXAM - RIGHT EYE: OD_EXAM: NORMAL

## 2019-06-12 NOTE — NURSING NOTE
Chief Complaints and History of Present Illnesses   Patient presents with     Corneal Evaluation     Follow up before surgery.     Chief Complaint(s) and History of Present Illness(es)     Corneal Evaluation     Comments: Follow up before surgery.              Comments     Pt states vision in LE is more blurry since last visit. No eye pain today.  No flashes or floaters. No redness dryness or irritation.    Ute FINLEY June 12, 2019 7:57 AM

## 2019-06-12 NOTE — PROGRESS NOTES
CC: Failed DSAEK OS/ ACIOL OS    Interval: Patient states vision is more blurry. She is scheduled for surgery DSAEK/ACIOL explantation/Secondary scleral IOL OS. Patient continues to have blurry vision, worse in the morning. No pain, redness, tearing. No new flashes, floaters, diplopia.    HPI: Magali Macdonald is a 79 year old female s/p DSEK OS 5/9/18. H/o retinal detachment with repair and lensectomy with Dr. Maldonado. Patient had secondary ACIOL OS with Dr. Abraham. 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 Canchola who did a DSAEK OS. The patient then developed graft failure after 1 year, likely due to the ACIOL .    Gtts:  Pred Forte QID left eye  Unable to get Pietro drops    POH  ACIOL OS Dr. Abraham 10/15/14  Pars plana vitrectomy (PPV)/SBP/PPL/FGx OS 10/3/13 for recurrent RRD and cataract  Pars plana vitrectomy OS  (PPV)/FGx 6/2013 by Oli      Assessment & Plan    1. H/o DSEK OS 5/9/18  - 2/2 ACIOL likely  - Graft failure; edematous cornea persists  - no bullae but diffuse PEE, early subepi scarring nasally  - recommend ACIOL explant with Yamane intraocular lens vs iris fixation and repeat DSEK, SK OS  - Patient wants Dr. Rudd to be primary surgeon, okay with fellow assisting.   - Discussed R/B/A extensively, IOL acceptable - patient wishes to proceed with surgery    2. H/o CME OS  - History of chronic cystoid macular edema  - Return to Dr. Maldonado for f/u    3. ERM OU    4. Pseudophakia left eye  - plan for AC/IOL explant during surgery    5. NS right eye  - 20/30+; pt not bothered by changes in vision; plan to stabilize left eye first    6. ON drusen    7. Refractive error OD   - Satsified with RGP    Surgical planning. IOL calcs were repeated.    Attending Physician Attestation:  Complete documentation of historical and exam elements from today's encounter can be found in the full encounter summary report (not reduplicated in this progress note).  I personally obtained  the chief complaint(s) and history of present illness.  I confirmed and edited as necessary the review of systems, past medical/surgical history, family history, social history, and examination findings as documented by others; and I examined the patient myself.  I personally reviewed the relevant tests, images, and reports as documented above.  I formulated and edited as necessary the assessment and plan and discussed the findings and management plan with the patient and family. - Moises Rudd MD    I personally spent great than 40min with the patient, of which >50% of the time was spent face to face with the patient, counseling and coordinating care with the patient. We discussed the complexity of her diagnosis, the need for further information prior to proceeding with yet another surgery, and the unknown prognosis for the patient at this time.    Moises Rudd MD

## 2019-06-20 ENCOUNTER — TELEPHONE (OUTPATIENT)
Dept: OPHTHALMOLOGY | Facility: CLINIC | Age: 79
End: 2019-06-20

## 2019-06-20 NOTE — TELEPHONE ENCOUNTER
Called patient to regarding procedure with Dr. Rudd on 6/25/19.    Advised fax has been sent to [facility] for van transportation for surgery and they will call her on 6/24/19 in the afternoon with time for pickup.    Advised to call with my direct line [phone] with any other questions.

## 2019-06-25 ENCOUNTER — TRANSFERRED RECORDS (OUTPATIENT)
Dept: HEALTH INFORMATION MANAGEMENT | Facility: CLINIC | Age: 79
End: 2019-06-25

## 2019-06-26 ENCOUNTER — OFFICE VISIT (OUTPATIENT)
Dept: OPHTHALMOLOGY | Facility: CLINIC | Age: 79
End: 2019-06-26
Attending: OPHTHALMOLOGY
Payer: COMMERCIAL

## 2019-06-26 DIAGNOSIS — Z94.7 POST CORNEAL TRANSPLANT: Primary | ICD-10-CM

## 2019-06-26 DIAGNOSIS — Z96.1 PSEUDOPHAKIA, LEFT EYE: ICD-10-CM

## 2019-06-26 DIAGNOSIS — T86.8419 FAILED CORNEAL TRANSPLANT: ICD-10-CM

## 2019-06-26 DIAGNOSIS — H18.20 CORNEAL EDEMA OF LEFT EYE: ICD-10-CM

## 2019-06-26 DIAGNOSIS — H18.12 BULLOUS KERATOPATHY OF LEFT EYE: ICD-10-CM

## 2019-06-26 PROCEDURE — G0463 HOSPITAL OUTPT CLINIC VISIT: HCPCS | Mod: ZF

## 2019-06-26 ASSESSMENT — PACHYMETRY: OS_CT(UM): 860

## 2019-06-26 ASSESSMENT — SLIT LAMP EXAM - LIDS
COMMENTS: NORMAL
COMMENTS: NORMAL

## 2019-06-26 ASSESSMENT — EXTERNAL EXAM - RIGHT EYE: OD_EXAM: NORMAL

## 2019-06-26 ASSESSMENT — TONOMETRY
IOP_METHOD: TONOPEN
IOP_METHOD: ICARE
OS_IOP_MMHG: 24
OD_IOP_MMHG: RGP

## 2019-06-26 ASSESSMENT — VISUAL ACUITY
METHOD: SNELLEN - LINEAR
OD_CC+: -2
OS_SC: 20/150
CORRECTION_TYPE: CONTACTS
OD_CC: 20/40

## 2019-06-26 ASSESSMENT — EXTERNAL EXAM - LEFT EYE: OS_EXAM: NORMAL

## 2019-06-26 NOTE — PROGRESS NOTES
CC: Failed DSAEK OS/ ACIOL OS    Interval: POD#1 s/p DSAEK, ACIOL explant, Yamane left eye. Had mild discomfort in eye, relieved by tylenol. No brow pain.     HPI: Magali Macdonald is a 79 year old female s/p DSEK OS 5/9/18. H/o retinal detachment with repair and lensectomy with Dr. Maldonado. Patient had secondary ACIOL OS with Dr. Abraham. 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 Canchola who did a DSAEK OS. The patient then developed graft failure after 1 year, likely due to the ACIOL .    Gtts:  Pred Forte QID left eye  Unable to get Pietro drops    POH  ACIOL OS Dr. Abraham 10/15/14  Pars plana vitrectomy (PPV)/SBP/PPL/FGx OS 10/3/13 for recurrent RRD and cataract  Pars plana vitrectomy OS  (PPV)/FGx 6/2013 by Oli      Assessment & Plan    1. H/o DSEK OS 5/9/18  - 2/2 ACIOL likely  -POD#1 s/p DSAEK, ACIOL explant, scleral fixated IOL, EDTA chelation left eye 6/25/19  -vision improved, graft in good position without interface fluid  -will cut prolene suture, keep BCL  -start pred, oflox, ketorolac  -supine position 2/3 of remainder of today and overnight  -restrictions reviewed    2. H/o CME OS  - History of chronic cystoid macular edema  - Return to Dr. Maldonado for f/u    3. ERM OU    4. Pseudophakia left eye  - plan for AC/IOL explant during surgery    5. NS right eye  - 20/30+; pt not bothered by changes in vision; plan to stabilize left eye first    6. ON drusen    7. Refractive error OD   - Satsified with KARRIE Hassan MD  PGY-5, Cornea Fellow    Attending Physician Attestation:  Complete documentation of historical and exam elements from today's encounter can be found in the full encounter summary report (not reduplicated in this progress note).  I personally obtained the chief complaint(s) and history of present illness.  I confirmed and edited as necessary the review of systems, past medical/surgical history, family history, social history, and  examination findings as documented by others; and I examined the patient myself.  I personally reviewed the relevant tests, images, and reports as documented above.  I formulated and edited as necessary the assessment and plan and discussed the findings and management plan with the patient and family. - Moises Rudd MD

## 2019-06-26 NOTE — NURSING NOTE
Chief Complaints and History of Present Illnesses   Patient presents with     Post Op (Ophthalmology) Left Eye      Chief Complaint(s) and History of Present Illness(es)     Post Op (Ophthalmology) Left Eye     Laterality: left eye              Comments     1 day post op left eye DSEAK, anterior chamber lens removal and IOL insertion.  Patient feel some discomfort in the left eye.  Took a tylenol this morning for discomfort.  Eye meds: none  JADON Cook 6/26/2019 9:11 AM

## 2019-06-26 NOTE — PATIENT INSTRUCTIONS
Medications in the surgical eye:    prednisolone acetate 1% four times a day     Ketorolac 0.4% four times a day     Ofloxacin four times a day        Stay on your back for 2/3 of the remainder of today and overnight. Then following the below restrictions:     Limit heavy lifting, bending over, and heavy exertion. Light aerobic activity is acceptable. Avoid swimming pools, hot tubs, or saunas for 3-4 weeks.   Wear the protective shield at night for the next seven days, and call for increased redness, discharge, pain, or decreased vision.

## 2019-07-01 ENCOUNTER — OFFICE VISIT (OUTPATIENT)
Dept: OPHTHALMOLOGY | Facility: CLINIC | Age: 79
End: 2019-07-01
Attending: OPHTHALMOLOGY
Payer: COMMERCIAL

## 2019-07-01 DIAGNOSIS — H18.12 BULLOUS KERATOPATHY OF LEFT EYE: Primary | ICD-10-CM

## 2019-07-01 DIAGNOSIS — H40.052 BORDERLINE GLAUCOMA OF LEFT EYE WITH OCULAR HYPERTENSION: ICD-10-CM

## 2019-07-01 PROCEDURE — G0463 HOSPITAL OUTPT CLINIC VISIT: HCPCS | Mod: ZF

## 2019-07-01 RX ORDER — BRIMONIDINE TARTRATE 2 MG/ML
1 SOLUTION/ DROPS OPHTHALMIC 2 TIMES DAILY
Qty: 5 ML | Refills: 3 | Status: SHIPPED | OUTPATIENT
Start: 2019-07-01 | End: 2019-09-03

## 2019-07-01 ASSESSMENT — TONOMETRY
OD_IOP_MMHG: RGP
IOP_METHOD: TONOPEN
OS_IOP_MMHG: 18
OS_IOP_MMHG: 42
IOP_METHOD: ICARE
OS_IOP_MMHG: 17
IOP_METHOD: TONOPEN

## 2019-07-01 ASSESSMENT — VISUAL ACUITY
METHOD: SNELLEN - LINEAR
OS_SC: CF @ 3'
OD_CC: 20/25

## 2019-07-01 ASSESSMENT — EXTERNAL EXAM - LEFT EYE: OS_EXAM: NORMAL

## 2019-07-01 ASSESSMENT — SLIT LAMP EXAM - LIDS
COMMENTS: NORMAL
COMMENTS: NORMAL

## 2019-07-01 ASSESSMENT — EXTERNAL EXAM - RIGHT EYE: OD_EXAM: NORMAL

## 2019-07-01 NOTE — PROGRESS NOTES
CC: Failed DSAEK OS/ ACIOL OS    Interval: POW#1 s/p DSAEK, ACIOL explant, Yamane left eye. States vision is hazy.      HPI: Magali Macdonald is a 79 year old female s/p DSEK OS 5/9/18. H/o retinal detachment with repair and lensectomy with Dr. Maldonado. Patient had secondary ACIOL OS with Dr. Abraham. 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 Canchola who did a DSAEK OS. The patient then developed graft failure after 1 year, likely due to the ACIOL .    Gtts:  Pred Forte QID left eye  Unable to get Pietro drops    POH  ACIOL OS Dr. Abraham 10/15/14  Pars plana vitrectomy (PPV)/SBP/PPL/FGx OS 10/3/13 for recurrent RRD and cataract  Pars plana vitrectomy OS  (PPV)/FGx 6/2013 by Oli      Assessment & Plan    1. H/o DSEK OS 5/9/18  - 2/2 ACIOL likely  -POW#1 s/p DSAEK, ACIOL explant, scleral fixated IOL, EDTA chelation left eye 6/25/19  -doing well, but elevated IOP (to about low 30's by palpation) without bubble - likely retained visco  -remove suture at paracentesis and burped down in clinic  -D/C BCL   -continue prednisolone QID OS  -D/C ketorolac  -continue ofloxacin QID OS until empty  -start brimonidine BID OS    2. H/o CME OS  - History of chronic cystoid macular edema  - Return to Dr. Maldonado for f/u    3. ERM OU    4. Pseudophakia left eye  - plan for AC/IOL explant during surgery    5. NS right eye  - 20/30+; pt not bothered by changes in vision; plan to stabilize left eye first    6. ON drusen    7. Refractive error OD   - Satsified with RGP    Belem Hassan MD  PGY-5, Cornea Fellow    Attending Physician Attestation:  Complete documentation of historical and exam elements from today's encounter can be found in the full encounter summary report (not reduplicated in this progress note).  I personally obtained the chief complaint(s) and history of present illness.  I confirmed and edited as necessary the review of systems, past medical/surgical history, family  history, social history, and examination findings as documented by others; and I examined the patient myself.  I personally reviewed the relevant tests, images, and reports as documented above.  I formulated and edited as necessary the assessment and plan and discussed the findings and management plan with the patient and family. - Moises Rudd MD

## 2019-07-01 NOTE — NURSING NOTE
Chief Complaints and History of Present Illnesses   Patient presents with     Post Op (Ophthalmology) Left Eye     Chief Complaint(s) and History of Present Illness(es)     Post Op (Ophthalmology) Left Eye     Laterality: left eye    Associated symptoms: Negative for eye pain    Treatments tried: eye drops    Pain scale: 0/10              Comments     1 week post op LE DSEAK  Vision is hazy  Prednisolone qid LE  Ketorolac qid LE  pfloxacin qid LE  Radha Dziuk COA 9:14 AM July 1, 2019

## 2019-07-01 NOTE — PATIENT INSTRUCTIONS
Brimonidine (PURPLE Top) twice a day in the left eye  Prednisolone (PINK Top) four times a day in the left eye  Ofloxacin (TAN Top) four times a day until empty - left eye  Ketorolac (GREY Top) STOP

## 2019-07-22 ENCOUNTER — OFFICE VISIT (OUTPATIENT)
Dept: OPHTHALMOLOGY | Facility: CLINIC | Age: 79
End: 2019-07-22
Attending: OPHTHALMOLOGY
Payer: COMMERCIAL

## 2019-07-22 DIAGNOSIS — Z94.7 POST CORNEAL TRANSPLANT: Primary | ICD-10-CM

## 2019-07-22 DIAGNOSIS — H18.12 BULLOUS KERATOPATHY OF LEFT EYE: ICD-10-CM

## 2019-07-22 DIAGNOSIS — H18.20 CORNEAL EDEMA OF LEFT EYE: ICD-10-CM

## 2019-07-22 DIAGNOSIS — Z96.1 PSEUDOPHAKIA, LEFT EYE: ICD-10-CM

## 2019-07-22 DIAGNOSIS — H40.052 BORDERLINE GLAUCOMA OF LEFT EYE WITH OCULAR HYPERTENSION: ICD-10-CM

## 2019-07-22 PROCEDURE — 25000132 ZZH RX MED GY IP 250 OP 250 PS 637: Mod: ZF | Performed by: OPHTHALMOLOGY

## 2019-07-22 PROCEDURE — G0463 HOSPITAL OUTPT CLINIC VISIT: HCPCS | Mod: ZF

## 2019-07-22 RX ORDER — LATANOPROST 50 UG/ML
1 SOLUTION/ DROPS OPHTHALMIC AT BEDTIME
Qty: 2.5 ML | Refills: 0 | Status: SHIPPED | OUTPATIENT
Start: 2019-07-22 | End: 2019-09-03

## 2019-07-22 RX ORDER — ACETAZOLAMIDE 500 MG/1
500 CAPSULE, EXTENDED RELEASE ORAL ONCE
Status: COMPLETED | OUTPATIENT
Start: 2019-07-22 | End: 2019-07-22

## 2019-07-22 RX ORDER — OFLOXACIN 3 MG/ML
1 SOLUTION/ DROPS OPHTHALMIC 4 TIMES DAILY
COMMUNITY
End: 2019-09-12

## 2019-07-22 RX ORDER — ACETAZOLAMIDE 500 MG/1
500 CAPSULE, EXTENDED RELEASE ORAL 2 TIMES DAILY
Qty: 60 CAPSULE | Refills: 1 | Status: SHIPPED | OUTPATIENT
Start: 2019-07-22 | End: 2019-09-12

## 2019-07-22 RX ORDER — ACETAZOLAMIDE 250 MG/1
500 TABLET ORAL ONCE
Status: COMPLETED | OUTPATIENT
Start: 2019-07-22 | End: 2019-07-22

## 2019-07-22 RX ORDER — TIMOLOL MALEATE 5 MG/ML
1 SOLUTION/ DROPS OPHTHALMIC 2 TIMES DAILY
Qty: 5 ML | Refills: 0 | Status: SHIPPED | OUTPATIENT
Start: 2019-07-22 | End: 2019-09-13

## 2019-07-22 RX ADMIN — ACETAZOLAMIDE 500 MG: 250 TABLET ORAL at 10:23

## 2019-07-22 RX ADMIN — ACETAZOLAMIDE 500 MG: 500 CAPSULE, EXTENDED RELEASE ORAL at 10:22

## 2019-07-22 ASSESSMENT — VISUAL ACUITY
OD_CC: 20/30
OD_CC+: -2/+3
METHOD: SNELLEN - LINEAR
OS_SC: 8'/200
CORRECTION_TYPE: CONTACTS
OS_PH_SC: 20/400

## 2019-07-22 ASSESSMENT — TONOMETRY
OD_IOP_MMHG: RGP
IOP_METHOD: ICARE
OD_IOP_MMHG: RGP
OS_IOP_MMHG: 48
OS_IOP_MMHG: 49
IOP_METHOD: ICARE

## 2019-07-22 ASSESSMENT — EXTERNAL EXAM - LEFT EYE: OS_EXAM: NORMAL

## 2019-07-22 ASSESSMENT — EXTERNAL EXAM - RIGHT EYE: OD_EXAM: NORMAL

## 2019-07-22 ASSESSMENT — SLIT LAMP EXAM - LIDS
COMMENTS: NORMAL
COMMENTS: NORMAL

## 2019-07-22 NOTE — PROGRESS NOTES
CC: Failed DSAEK OS/ ACIOL OS    Interval: POM#1 s/p DSAEK, ACIOL explant, Yamane left eye. States vision is hazy.      HPI: Magali Macdonald is a 79 year old female s/p DSEK OS 5/9/18. H/o retinal detachment with repair and lensectomy with Dr. Maldonado. Patient had secondary ACIOL OS with Dr. Abraham. 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 Canchola who did a DSAEK OS. The patient then developed graft failure after 1 year, likely due to the ACIOL .    Interval:   Daniel is one month post op. She states she expected to have more clarity in the left eye than she has at this time. She denies any pain, redness or discharge. No flashes of light or floaters. No headaches, nausea.    Gtts:  Pred Avetate QID left eye  Ofloxacin QID left eye   Brimonidine BID left eye (last dose 11pm last night)  Has not tried Pietro 128 drops  PF AT (patient has sample but does not use at this time)    POH  DSAEK, ACIOL explant, scleral fixated IOL, EDTA chelation left eye 6/25/19  DSEK left eye 5/9/2018 (Dr. Canchola)  ACIOL OS Dr. Abraham 10/15/14  Pars plana vitrectomy (PPV)/SBP/PPL/FGx OS 10/3/13 for recurrent RRD and cataract  Pars plana vitrectomy OS  (PPV)/FGx 6/2013 by Oli      Assessment & Plan    1. POM#1 s/p DSAEK, ACIOL explant, scleral fixated IOL, EDTA chelation left eye 6/25/19   - Patient continues to have elevated IOP (to about mid 30's by palpation) without bubble - likely retained visco vs. Pigment in TM vs. Steroid responder.   - decrease prednisolone to TID OS    - discontinue ofloxacin (empty)   - continue brimonidine BID OS   - start Diamox 500mg po BID    - start timolol qAM OS   - latanoprost QHS OS    2. H/o CME OS   - History of chronic cystoid macular edema   - Return to Dr. Maldonado for f/u    3. ERM OU    4. Pseudophakia left eye   - plan for AC/IOL explant during surgery    5. NS right eye   - 20/30+; pt not bothered by changes in vision; plan to stabilize left eye first   -  Satsified with RGP    6. ON drusen    Follow up: 2-3 weeks for IOP check    --  Shoaib Sidhu,   PGY 5, Cornea Fellow  Ophthalmology    Attending Physician Attestation:  Complete documentation of historical and exam elements from today's encounter can be found in the full encounter summary report (not reduplicated in this progress note).  I personally obtained the chief complaint(s) and history of present illness.  I confirmed and edited as necessary the review of systems, past medical/surgical history, family history, social history, and examination findings as documented by others; and I examined the patient myself.  I personally reviewed the relevant tests, images, and reports as documented above.  I formulated and edited as necessary the assessment and plan and discussed the findings and management plan with the patient and family. - Moises Rudd MD

## 2019-07-22 NOTE — NURSING NOTE
Chief Complaints and History of Present Illnesses   Patient presents with     Post Op (Ophthalmology) Left Eye     Chief Complaint(s) and History of Present Illness(es)     Post Op (Ophthalmology) Left Eye     Laterality: left eye    Timing: throughout the day    Course: stable    Associated symptoms: Negative for eye pain, dryness, redness and tearing    Pain scale: 0/10              Comments     POM #1 s/p DSAEK, ACIOL explant, Yamane left eye.  Oflox 4 times a day to LE.  Prednisolone four times a day to LE.  Brimonidine BID to LE.  Pt notes that Prednisolone has been stinging upon instillation, where it never really has in all the years she has been using it, per pt.  PT states no changes / a bit 'disappointed, but I maybe don't know what to expect.'    Wilma FELIZ 8:32 AM 07/22/2019

## 2019-07-22 NOTE — PATIENT INSTRUCTIONS
Eye Drop Instructions:    Start/Stop Date Medication Name/Dose Eye Schedule Notes/Top Color   Decrease  Prednisolone Acetate Left 3x/day Treats inflammation   Continue  Brimonidine Left 2x/day Treats high eye pressure   Begin Timolol Left Every morning Treats high eye pressure   Begin Latanoprost Left At bedtime Treats high eye pressure          Begin Diamox 500mg By mouth 2x/day Treats high eye pressure                            Follow up in 2-3 weeks.

## 2019-07-25 ENCOUNTER — TELEPHONE (OUTPATIENT)
Dept: OPHTHALMOLOGY | Facility: CLINIC | Age: 79
End: 2019-07-25

## 2019-07-25 NOTE — TELEPHONE ENCOUNTER
Spoke with pt to f/u on her symptoms of drowsiness.  Dr. Rudd had suggested that she cut the Diamox dosage in half (taking 250 mg BID).  Pt states it is a capsule, so she will take this just at night (500 mg at bedtime).  If her symptoms persist or worsen over the next 24-48 hours, she is aware that she can stop the medication.    Ameena Rachel COA 5:53 PM July 25, 2019

## 2019-07-25 NOTE — TELEPHONE ENCOUNTER
[facility] Call Center    Phone Message    May a detailed message be left on voicemail: yes    Reason for Call: Symptoms or Concerns     If patient has red-flag symptoms, warm transfer to triage line    Current symptom or concern: Per Pt - stated she's been feeling drowsy from the timolol maleate (TIMOPTIC) 0.5 % ophthalmic solution    Symptoms have been present for:  1 day(s)    Has patient previously been seen for this? Yes    By : Dr Rudd    Date: 07/22/2019    Are there any new or worsening symptoms? Yes: New      Action Taken: Message routed to:  [facility] Eye

## 2019-08-12 ENCOUNTER — OFFICE VISIT (OUTPATIENT)
Dept: OPHTHALMOLOGY | Facility: CLINIC | Age: 79
End: 2019-08-12
Attending: OPHTHALMOLOGY
Payer: COMMERCIAL

## 2019-08-12 DIAGNOSIS — Z96.1 PSEUDOPHAKIA, LEFT EYE: ICD-10-CM

## 2019-08-12 DIAGNOSIS — H40.052 BORDERLINE GLAUCOMA OF LEFT EYE WITH OCULAR HYPERTENSION: ICD-10-CM

## 2019-08-12 DIAGNOSIS — Z94.7 POST CORNEAL TRANSPLANT: Primary | ICD-10-CM

## 2019-08-12 PROCEDURE — 76514 ECHO EXAM OF EYE THICKNESS: CPT | Mod: ZF | Performed by: OPHTHALMOLOGY

## 2019-08-12 PROCEDURE — G0463 HOSPITAL OUTPT CLINIC VISIT: HCPCS | Mod: ZF

## 2019-08-12 ASSESSMENT — TONOMETRY
OD_IOP_MMHG: RGP
OD_IOP_MMHG: RGP
OS_IOP_MMHG: 21
IOP_METHOD: ICARE
OS_IOP_MMHG: 18
OS_IOP_MMHG: 18
IOP_METHOD: TONOPEN
OD_IOP_MMHG: RGP
IOP_METHOD: TONOPEN

## 2019-08-12 ASSESSMENT — EXTERNAL EXAM - RIGHT EYE: OD_EXAM: NORMAL

## 2019-08-12 ASSESSMENT — CONF VISUAL FIELD
OD_NORMAL: 1
OS_NORMAL: 1
METHOD: COUNTING FINGERS

## 2019-08-12 ASSESSMENT — PACHYMETRY: OS_CT(UM): 717

## 2019-08-12 ASSESSMENT — VISUAL ACUITY
OD_CC: 20/25
OD_CC+: -3
METHOD: SNELLEN - LINEAR
OS_CC: 20/300

## 2019-08-12 ASSESSMENT — EXTERNAL EXAM - LEFT EYE: OS_EXAM: NORMAL

## 2019-08-12 ASSESSMENT — SLIT LAMP EXAM - LIDS
COMMENTS: NORMAL
COMMENTS: NORMAL

## 2019-08-12 NOTE — PATIENT INSTRUCTIONS
Decrease prednisolone (PINK Top) to twice a day in the left eye  Continue Timolol (YELLOW Top) to every morning in the left eye  Continue brimonidine (PURPLE Top) twice a day in the left eye  Continue Latanoprost (SILVA Top) at night in the left eye

## 2019-08-12 NOTE — PROGRESS NOTES
CC: Failed DSAEK OS/ ACIOL OS    Interval: POM#2 s/p DSAEK, ACIOL explant, Yamane left eye. States vision is hazy.      HPI: Magali Macdonald is a 79 year old female s/p DSEK OS 5/9/18. H/o retinal detachment with repair and lensectomy with Dr. Maldonado. Patient had secondary ACIOL OS with Dr. Abraham. 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 Canchola who did a DSAEK OS. The patient then developed graft failure after 1 year, likely due to the ACIOL .    Interval:   Patient is approx 2 months post op. She states her vision has some improvement since starting IOP lowering therapy. She denies any pain, redness or discharge. No flashes of light or floaters.     Gtts:  Pred Acetate TID left eye    Brimonidine BID left eye   Timolol QAM  Diamox 500mg po BID  Latanoprost at bedtime left eye     PF AT     POH  DSAEK, ACIOL explant, scleral fixated IOL, EDTA chelation left eye 6/25/19  DSEK left eye 5/9/2018 (Dr. Canchola)  ACIOL OS Dr. Abraham 10/15/14  Pars plana vitrectomy (PPV)/SBP/PPL/FGx OS 10/3/13 for recurrent RRD and cataract  Pars plana vitrectomy OS  (PPV)/FGx 6/2013 by Oli      Assessment & Plan    1. POM#2 s/p DSAEK, ACIOL explant, scleral fixated IOL, EDTA chelation left eye 6/25/19   - Patient on maximal medical therapy and IOP is controlled at 18mmHg today.   - Given patient is intolerant to diamox side effects, recommend discontinuing oral therapy and continue topical IOP lowering therapy below.   - decrease prednisolone acetate to BID OS    - continue brimonidine BID OS   - STOP Diamox 500mg po BID    - continue timolol qAM OS   - continue latanoprost QHS left eye   - Begin preservative free artificial tears at least four times daily.    2. H/o CME OS   - History of chronic cystoid macular edema   - Return to Dr. Maldonado for f/u    3. ERM OU    4. Pseudophakia left eye   - plan for AC/IOL explant during surgery    5. NS right eye   - 20/30+; pt not bothered by changes in  vision; plan to stabilize left eye first   - Satsified with RGP    6. ON drusen    Follow up: 1 month for IOP check    --  Shoaib Sidhu,   PGY 5, Cornea Fellow  Ophthalmology    Attending Physician Attestation:  Complete documentation of historical and exam elements from today's encounter can be found in the full encounter summary report (not reduplicated in this progress note).  I personally obtained the chief complaint(s) and history of present illness.  I confirmed and edited as necessary the review of systems, past medical/surgical history, family history, social history, and examination findings as documented by others; and I examined the patient myself.  I personally reviewed the relevant tests, images, and reports as documented above.  I formulated and edited as necessary the assessment and plan and discussed the findings and management plan with the patient and family. - Moises Rudd MD    --------------------------------------------------------------------------------------------------------------------------------------------  Pachymetry - Interpretation & Report  Indication: Post corneal transplant OS   Performed by: Moises Rudd MD  Reliability: good  Patient cooperation: good  Findings:   Left eye:  717 micrometers centrally   Interval Change, Assessment, & Impact on treatment:   Left eye:  Decrease K edema - improving   Signed: Moises Rudd 8/12/2019 2:10 PM

## 2019-09-03 ENCOUNTER — TELEPHONE (OUTPATIENT)
Dept: OPHTHALMOLOGY | Facility: CLINIC | Age: 79
End: 2019-09-03

## 2019-09-03 DIAGNOSIS — H40.052 BORDERLINE GLAUCOMA OF LEFT EYE WITH OCULAR HYPERTENSION: ICD-10-CM

## 2019-09-03 DIAGNOSIS — H18.12 BULLOUS KERATOPATHY OF LEFT EYE: ICD-10-CM

## 2019-09-03 RX ORDER — LATANOPROST 50 UG/ML
1 SOLUTION/ DROPS OPHTHALMIC AT BEDTIME
Qty: 2.5 ML | Refills: 5 | Status: SHIPPED | OUTPATIENT
Start: 2019-09-03 | End: 2020-07-06

## 2019-09-03 RX ORDER — BRIMONIDINE TARTRATE 2 MG/ML
1 SOLUTION/ DROPS OPHTHALMIC 2 TIMES DAILY
Qty: 5 ML | Refills: 5 | Status: SHIPPED | OUTPATIENT
Start: 2019-09-03 | End: 2020-06-15

## 2019-09-03 NOTE — TELEPHONE ENCOUNTER
Rx's sent  Pt aware  Jason Ortiz RN RN 5:51 PM 09/03/19        [facility] Call Center    Phone Message    May a detailed message be left on voicemail: yes    Reason for Call: Other: Pt is calling back, she out of her eye medication, needng a refill ASAP, PT states she can't any longer, Please call the pt back ASAP, thank you     Action Taken: Message routed to:  [facility] Eye

## 2019-09-03 NOTE — TELEPHONE ENCOUNTER
[facility] Call Center    Phone Message    May a detailed message be left on voicemail: yes    Reason for Call: Medication Question or concern regarding medication   Prescription Clarification  Name of Medication:   *brimonidine (ALPHAGAN) 0.2 % ophthalmic solution,  *latanoprost (XALATAN) 0.005 % ophthalmic solution  Prescribing Provider: Moises Rudd   Pharmacy: [facility] [address] [place] [address]   What on the order needs clarification? Pt is wanting to know if she is needing to continue to take these medications. Pt states she is out of her medications and is wanting to know if she is needing to continue to take more.         Action Taken: Message routed to:  Other: [facility]

## 2019-09-09 ENCOUNTER — TELEPHONE (OUTPATIENT)
Dept: OPHTHALMOLOGY | Facility: CLINIC | Age: 79
End: 2019-09-09

## 2019-09-09 NOTE — TELEPHONE ENCOUNTER
[facility] Call Center    Phone Message    May a detailed message be left on voicemail: yes    Reason for Call: Other: Pt called and wanted to schedule a f/u with Dr. Rudd for Sept but there is no opening until jan 2020.  Pt said that is too far out and would like a call back from Susie.  Pt said she use to have Susie's direct number but misplaced it .  Please call the pt to discuss this. Thanks     Action Taken: Message routed to:  [facility] eye clinic

## 2019-09-10 NOTE — TELEPHONE ENCOUNTER
Called and SWp to schedule her for rtn appt, scheduled and she will see us.    Alesia Navarrete, COA COA 12:16 PM September 10, 2019

## 2019-09-12 ENCOUNTER — OFFICE VISIT (OUTPATIENT)
Dept: OPHTHALMOLOGY | Facility: CLINIC | Age: 79
End: 2019-09-12
Attending: OPHTHALMOLOGY
Payer: COMMERCIAL

## 2019-09-12 DIAGNOSIS — H18.12 BULLOUS KERATOPATHY OF LEFT EYE: Primary | ICD-10-CM

## 2019-09-12 PROCEDURE — G0463 HOSPITAL OUTPT CLINIC VISIT: HCPCS | Mod: ZF

## 2019-09-12 ASSESSMENT — CONF VISUAL FIELD
OD_NORMAL: 1
OS_SUPERIOR_TEMPORAL_RESTRICTION: 3
METHOD: COUNTING FINGERS
OS_INFERIOR_TEMPORAL_RESTRICTION: 3

## 2019-09-12 ASSESSMENT — VISUAL ACUITY
CORRECTION_TYPE: CONTACTS
OS_CC+: -2
OD_CC: 20/25-3
OS_CC: 20/70
OS_PH_CC: 20/50
OD_CC+: +2
METHOD: SNELLEN - LINEAR

## 2019-09-12 ASSESSMENT — REFRACTION_WEARINGRX
OD_CYLINDER: +0.50
OD_ADD: +3.00
SPECS_TYPE: PAL
OS_AXIS: 110
OS_CYLINDER: +3.00
OD_AXIS: 035
OD_SPHERE: -6.00
OS_SPHERE: -2.00

## 2019-09-12 ASSESSMENT — SLIT LAMP EXAM - LIDS
COMMENTS: NORMAL
COMMENTS: NORMAL

## 2019-09-12 ASSESSMENT — TONOMETRY
OS_IOP_MMHG: 12
IOP_METHOD: TONOPEN
OD_IOP_MMHG: --

## 2019-09-12 ASSESSMENT — CUP TO DISC RATIO: OS_RATIO: 0.1

## 2019-09-12 ASSESSMENT — PACHYMETRY: OS_CT(UM): 653

## 2019-09-12 ASSESSMENT — EXTERNAL EXAM - RIGHT EYE: OD_EXAM: NORMAL

## 2019-09-12 ASSESSMENT — EXTERNAL EXAM - LEFT EYE: OS_EXAM: NORMAL

## 2019-09-12 NOTE — NURSING NOTE
Chief Complaints and History of Present Illnesses   Patient presents with     Follow Up     1 month follow-up S/p DSAEK, ACIOL explant, scleral fixated IOL, EDTA chelation left eye 6/25/19     Chief Complaint(s) and History of Present Illness(es)     Follow Up     Comments: 1 month follow-up S/p DSAEK, ACIOL explant, scleral fixated IOL, EDTA chelation left eye 6/25/19              Comments     Pt feels her vision has improved in LE since last visit.  Denies any pain, pressure, irritation, discharge, and tearing.    Hilda Ospina OT 1:02 PM September 12, 2019

## 2019-09-12 NOTE — PROGRESS NOTES
CC: Failed DSAEK OS/ ACIOL OS    Interval: POM#3 s/p DSAEK, ACIOL explant, Yamane left eye. States vision is improving, no pain, feeling much better since stopping Diamox. Vision is mildly more blurry in the morning but rapidly improves. She is still not using any artificial tears      HPI: Magali Macdonald is a 79 year old female s/p DSEK OS 5/9/18. H/o retinal detachment with repair and lensectomy with Dr. Maldonado. Patient had secondary ACIOL OS with Dr. Abraham. 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 Canchola who did a DSAEK OS. The patient then developed graft failure after 1 year, likely due to the ACIOL .    Gtts:  Pred Acetate BID left eye    Brimonidine BID left eye   Timolol QAM  Latanoprost at bedtime left eye     PF AT     POH  DSAEK, ACIOL explant, scleral fixated IOL, EDTA chelation left eye 6/25/19  DSEK left eye 5/9/2018 (Dr. Canchola)  ACIOL OS Dr. Abraham 10/15/14  Pars plana vitrectomy (PPV)/SBP/PPL/FGx OS 10/3/13 for recurrent RRD and cataract  Pars plana vitrectomy OS  (PPV)/FGx 6/2013 by Oli      Assessment & Plan    1. POM#3 s/p DSAEK, ACIOL explant, scleral fixated IOL, EDTA chelation left eye 6/25/19   - Patient remains in good range at 12 since stopping Diamox   - continue prednisolone acetate to BID OS    -stop brimonidine   - continue timolol qAM OS   - continue latanoprost QHS left eye   - Begin preservative free artificial tears at least four times daily.   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 H/o CME OS   - History of chronic cystoid macular edema   - Return to Dr. Maldonado for f/u    3. ERM OU    4. Pseudophakia left eye   - plan for AC/IOL explant during surgery    5. NS right eye   - 20/30+; pt not bothered by changes in vision; plan to stabilize left eye first   - Satsified with RGP    6. ON drusen    Follow up: 1 month for IOP check  and repeat pachy/exam    --  Reji Dee MD  PGY5, Cornea Fellow  Ophthalmology    Attending Physician Attestation:  Complete documentation of historical and exam elements from today's encounter can be found in the full encounter summary report (not reduplicated in this progress note).  I personally obtained the chief complaint(s) and history of present illness.  I confirmed and edited as necessary the review of systems, past medical/surgical history, family history, social history, and examination findings as documented by others; and I examined the patient myself.  I personally reviewed the relevant tests, images, and reports as documented above.  I formulated and edited as necessary the assessment and plan and discussed the findings and management plan with the patient and family. I was present for the key portions of the procedure and immediately available for the remainder. - Shoaib Sidhu D.O.    --  Shoaib Sidhu, DO  PGY 5, Cornea Fellow  Ophthalmology

## 2019-09-13 DIAGNOSIS — H40.052 BORDERLINE GLAUCOMA OF LEFT EYE WITH OCULAR HYPERTENSION: ICD-10-CM

## 2019-09-13 RX ORDER — TIMOLOL MALEATE 5 MG/ML
1 SOLUTION/ DROPS OPHTHALMIC EVERY MORNING
Qty: 5 ML | Refills: 3 | Status: SHIPPED | OUTPATIENT
Start: 2019-09-13 | End: 2020-06-15

## 2019-09-13 NOTE — TELEPHONE ENCOUNTER
Medication: Timolol Maleate Ophthalmic Solution 0.5%  Requested directions: ONE DROP INTO THE LEFT EYE TWICE DAILY.  Current directions on the medication list: same    Last Written Prescription Date:  7-22-19  Last Fill Quantity: 5 ml,   # refills: 0    Last Office Visit: 9-12-19  Future Office visit: 10-21-19    Attending Provider:Nahum  Last Clinic Note:      - continue timolol qAM OS    Routing refill request to provider for review/approval because:  Inconsistent dose/direction    Requested med/current med does not match clinic note

## 2019-10-21 ENCOUNTER — OFFICE VISIT (OUTPATIENT)
Dept: OPHTHALMOLOGY | Facility: CLINIC | Age: 79
End: 2019-10-21
Attending: OPHTHALMOLOGY
Payer: COMMERCIAL

## 2019-10-21 DIAGNOSIS — Z94.7 POST CORNEAL TRANSPLANT: Primary | ICD-10-CM

## 2019-10-21 DIAGNOSIS — Z94.7 POST CORNEAL TRANSPLANT: ICD-10-CM

## 2019-10-21 PROCEDURE — G0463 HOSPITAL OUTPT CLINIC VISIT: HCPCS | Mod: 25,ZF

## 2019-10-21 PROCEDURE — 68761 CLOSE TEAR DUCT OPENING: CPT | Mod: ZF | Performed by: OPHTHALMOLOGY

## 2019-10-21 ASSESSMENT — TONOMETRY
OS_IOP_MMHG: 14
IOP_METHOD: ICARE
OD_IOP_MMHG: RGP

## 2019-10-21 ASSESSMENT — PACHYMETRY: OS_CT(UM): 689

## 2019-10-21 ASSESSMENT — VISUAL ACUITY
CORRECTION_TYPE: CONTACTS
METHOD: SNELLEN - LINEAR
OS_SC+: -1
OS_SC: 20/60
OS_PH_SC+: +2
OS_PH_SC: 20/50
OD_CC+: -1
OD_CC: 20/40 SLOW

## 2019-10-21 ASSESSMENT — CONF VISUAL FIELD
OD_NORMAL: 1
OS_INFERIOR_NASAL_RESTRICTION: 3

## 2019-10-21 ASSESSMENT — SLIT LAMP EXAM - LIDS
COMMENTS: NORMAL
COMMENTS: NORMAL

## 2019-10-21 ASSESSMENT — EXTERNAL EXAM - RIGHT EYE: OD_EXAM: NORMAL

## 2019-10-21 ASSESSMENT — CUP TO DISC RATIO: OS_RATIO: 0.1

## 2019-10-21 ASSESSMENT — EXTERNAL EXAM - LEFT EYE: OS_EXAM: NORMAL

## 2019-10-21 NOTE — PROGRESS NOTES
CC: Failed DSAEK OS/ ACIOL OS - s/p repeat DSAEK OS    HPI: Magali Macdonald is a 79 year old female s/p DSEK OS 5/9/18. H/o retinal detachment with repair and lensectomy with Dr. Maldonado. Patient had secondary ACIOL OS with Dr. Abraham. 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 Canchola who did a DSAEK OS. The patient then developed graft failure after 1 year, likely due to the ACIOL .    Interval Hx: POM#4 s/p DSAEK, ACIOL explant, Yamane left eye. States vision is improving, no pain, feeling much better since stopping Diamox. Vision is mildly more blurry in the morning but rapidly improves. She is still not using any artificial tears No new flashes, floaters diplopia. At last visit, patient was taken off of brimonidine.    Gtts:  Pred Acetate BID left eye  Timolol QAM  Latanoprost QHS OS  PFATs prn (states she is only rarely using)    POHx:  DSAEK, ACIOL explant, scleral fixated IOL, EDTA chelation left eye 6/25/19  DSEK left eye 5/9/2018 (Dr. Canchola)  ACIOL OS Dr. Abraham 10/15/14  Pars plana vitrectomy (PPV)/SBP/PPL/FGx OS 10/3/13 for recurrent RRD and cataract  Pars plana vitrectomy OS  (PPV)/FGx 6/2013 by Oli      Assessment & Plan    1. POM#4 s/p DSAEK, ACIOL explant, scleral fixated IOL, EDTA chelation left eye 6/25/19   - Patient remains in good range at 14 since stopping Diamox and now since stopping brimonidine   - continue prednisolone acetate to BID OS    - stop timolol   - continue latanoprost QHS OS   - increase preservative free artificial tears to using at least four times daily (using rarely currently).   - pachy slightly increased (689 today from 653 one month ago), VA and edema are improving however   - mild surface keratopathy nasally and superiorly along limbus OS. Medium Eaglevision plug placed OS, Small OD    2. H/o CME OS   - History of chronic cystoid macular edema   - Return to Dr. Maldonado for f/u    3. ERM OU    4. Pseudophakia left eye   - has  scleral fixated lens, doing well    5. NS right eye   - 20/30-40; pt not bothered by changes in vision; plan to stabilize left eye first   [facility]    6. ON [facility]usen    Follow up: 6-8 weeks for IOP check and repeat pachy/exam, as well as manifest refraction    --  Reji Dee MD  PGY5, Cornea Fellow  Ophthalmology    Attending Physician Attestation:  Complete documentation of historical and exam elements from today's encounter can be found in the full encounter summary report (not reduplicated in this progress note).  I personally obtained the chief complaint(s) and history of present illness.  I confirmed and edited as necessary the review of systems, past medical/surgical history, family history, social history, and examination findings as documented by others; and I examined the patient myself.  I personally reviewed the relevant tests, images, and reports as documented above.  I formulated and edited as necessary the assessment and plan and discussed the findings and management plan with the patient and family. I was present for the key portions of the procedure and immediately available for the remainder. - Moises Rudd MD    --------------------------------------------------------------------------------------------------------------------------------------------  Pachymetry - Interpretation & Report  Indication: Post corneal transplant OS  Performed by: Moises Rudd MD  Reliability: good  Patient cooperation: good  Findings:   Left eye:  689 micrometers centrally   Interval Change, Assessment, & Impact on treatment:   Left eye:  Slightly thicker K edema OS   Signed: Moises Rudd MD 10/21/2019 7:18 PM

## 2019-10-21 NOTE — NURSING NOTE
Chief Complaints and History of Present Illnesses   Patient presents with     Follow Up     Chief Complaint(s) and History of Present Illness(es)     Follow Up     Laterality: left eye    Onset: 4 weeks ago    Pain scale: 0/10              Comments     IOP check  S/p DSAEK, ACIOL explant, scleral fixated IOL, EDTA chelation left eye 6/25/19  Pt reports the vision has improved since last visit    Ocular meds:  Prednisolone acetate to BID left eye  Timolol qAM left eye  Latanoprost QHS left eye  Preservative free artificial tears at least four times daily - pt reports she doesn't use faithfully    SLADE Montoya 9:02 AM October 21, 2019

## 2019-11-02 ENCOUNTER — HEALTH MAINTENANCE LETTER (OUTPATIENT)
Age: 79
End: 2019-11-02

## 2020-02-10 ENCOUNTER — HEALTH MAINTENANCE LETTER (OUTPATIENT)
Age: 80
End: 2020-02-10

## 2020-05-26 ENCOUNTER — TELEPHONE (OUTPATIENT)
Dept: OPHTHALMOLOGY | Facility: CLINIC | Age: 80
End: 2020-05-26

## 2020-05-26 DIAGNOSIS — Z94.7 POST CORNEAL TRANSPLANT: ICD-10-CM

## 2020-05-26 NOTE — TELEPHONE ENCOUNTER
[facility] Call Center    Phone Message    May a detailed message be left on voicemail: yes     Reason for Call: Other: Per patient, [facility] pharmacy as sent over numerous requests for eye drop medication. Patient is almost out and will be going out of town. Please call patient to let her know the status.     Action Taken: Other: Eye Clinic    Travel Screening: Not Applicable

## 2020-05-28 NOTE — TELEPHONE ENCOUNTER
" Centralized Medication Refill Team note:  Reviewed  our queue @ 3015 5/28  in @ Centralized medication refill.   No requests seen in interface or fax for this patient.    Plan : Await call via triage or MyChart request for specific medications needed as per OPHTH advice to patient    Active scripts per Epic med list:  timolol maleate (TIMOPTIC) 0.5 % ophthalmic solution  5 mL  3  9/13/2019   No    Sig - Route: Place 1 drop Into the left eye every morning - Left Eye      brimonidine (ALPHAGAN) 0.2 % ophthalmic solution  5 mL  5  9/3/2019   No    Sig - Route: Place 1 drop Into the left eye 2 times daily - Left Eye      latanoprost (XALATAN) 0.005 % ophthalmic solution  2.5 mL  5  9/3/2019   No    Sig - Route: Place 1 drop Into the left eye At Bedtime - Left Eye     prednisoLONE acetate (PRED FORTE) 1 % ophthalmic suspension  5 mL  5  8/7/2019   No    Sig - Route: Place 1 drop Into the left eye 3 times daily - Left Eye      OPHTH Last note: 10/21/2019 Dr uRdd/Leila, excerpted:  \"Assessment & Plan     1. POM#4 s/p DSAEK, ACIOL explant, scleral fixated IOL, EDTA chelation left eye 6/25/19              - Patient remains in good range at 14 since stopping Diamox and now since stopping brimonidine              - continue prednisolone acetate to BID OS               - stop timolol              - continue latanoprost QHS OS              - increase preservative free artificial tears to using at least four times daily (using rarely currently).\"      **Follow up recommended 6-8 weeks  No appt in place currently  "

## 2020-05-28 NOTE — TELEPHONE ENCOUNTER
I left a VM for patient asking which medications she needs refills on.  I will also check with the medication refill team and see if they have an update with a request from [facility].  I asked the patient to call the triage line or send a [employer] message for further communication.

## 2020-05-29 RX ORDER — PREDNISOLONE ACETATE 10 MG/ML
1 SUSPENSION/ DROPS OPHTHALMIC 2 TIMES DAILY
Qty: 5 ML | Refills: 1 | Status: SHIPPED | OUTPATIENT
Start: 2020-05-29 | End: 2020-08-19

## 2020-05-29 NOTE — TELEPHONE ENCOUNTER
Please contact patient to make sure she will schedule an appointment. I will refill her prednisolone eyedrops. For additional refills in the future, she will need to be seen in clinic. Dr. Rudd or continuity clinic to staff with Dr. Rudd. Thank you!

## 2020-05-29 NOTE — TELEPHONE ENCOUNTER
Patient was called it is the prednisolone which she needs refilled - none of the others need refills at this time.    Prednisolone gtt      Last Written Prescription Date:  8-7-19  Last Fill Quantity: 5 ml,   # refills: 5  Last Office Visit : 10-21-19  Future Office visit:  None  Patient states she will call today or next week to get appointment scheduled.    Routing refill request to provider for review/approval because:  Requires provider authorization.      Kathleen M Doege RN

## 2020-05-30 NOTE — TELEPHONE ENCOUNTER
COVID-19 Reschedule  Date contacted: May 30, 2020 2:51 PM    Type of contact: telephone  Attending provider: Tobin    Rescheduled appointment date: February   New appointment date: 6/15/20 @7:30am Tobin, (possible same day follow-up RGP recheck @11:30 w/Sheela pending provider approval)    Included the number for the Eye Clinic call center ([phone]) in case rescheduled appointment time/date does not work for the patient's availability: yes    COVID-19 reasoning given about cancellation: Yes  COVID-19 warnings about screenings and visitor policy relayed: Yes    Lesli Payne COT 2:51 PM May 30, 2020

## 2020-06-15 ENCOUNTER — OFFICE VISIT (OUTPATIENT)
Dept: OPHTHALMOLOGY | Facility: CLINIC | Age: 80
End: 2020-06-15
Attending: OPHTHALMOLOGY
Payer: COMMERCIAL

## 2020-06-15 DIAGNOSIS — H35.373 EPIRETINAL MEMBRANE (ERM) OF BOTH EYES: Primary | ICD-10-CM

## 2020-06-15 DIAGNOSIS — H18.20 CORNEAL EDEMA OF LEFT EYE: ICD-10-CM

## 2020-06-15 DIAGNOSIS — H18.12 BULLOUS KERATOPATHY, LEFT EYE: ICD-10-CM

## 2020-06-15 PROCEDURE — 76514 ECHO EXAM OF EYE THICKNESS: CPT | Mod: ZF | Performed by: OPHTHALMOLOGY

## 2020-06-15 PROCEDURE — 92134 CPTRZ OPH DX IMG PST SGM RTA: CPT | Mod: ZF | Performed by: OPHTHALMOLOGY

## 2020-06-15 PROCEDURE — G0463 HOSPITAL OUTPT CLINIC VISIT: HCPCS | Mod: ZF

## 2020-06-15 ASSESSMENT — REFRACTION_MANIFEST
OS_CYLINDER: +1.75
OS_AXIS: 110
OD_SPHERE: -7.00
OD_AXIS: 085
OS_ADD: +3.00
OD_ADD: +3.00
OD_CYLINDER: +0.50
OS_SPHERE: -5.00

## 2020-06-15 ASSESSMENT — TONOMETRY
IOP_METHOD: ICARE
OS_IOP_MMHG: 16
OD_IOP_MMHG: RGP

## 2020-06-15 ASSESSMENT — VISUAL ACUITY
METHOD: SNELLEN - LINEAR
OS_PH_SC+: -2
OD_CC: 20/30
OS_SC: 20/80
OS_PH_SC: 20/60
CORRECTION_TYPE: CONTACTS
OD_CC+: -1

## 2020-06-15 ASSESSMENT — SLIT LAMP EXAM - LIDS
COMMENTS: NORMAL, NO PLUG
COMMENTS: NORMAL, NO PLUG

## 2020-06-15 ASSESSMENT — REFRACTION_WEARINGRX
OS_CYLINDER: +3.00
OD_CYLINDER: +0.50
OD_SPHERE: -6.00
OD_ADD: +3.00
OS_SPHERE: -2.00
OD_AXIS: 035
SPECS_TYPE: PAL
OS_AXIS: 110

## 2020-06-15 ASSESSMENT — CONF VISUAL FIELD
OD_NORMAL: 1
METHOD: COUNTING FINGERS
OS_NORMAL: 1

## 2020-06-15 ASSESSMENT — EXTERNAL EXAM - LEFT EYE: OS_EXAM: NORMAL

## 2020-06-15 ASSESSMENT — EXTERNAL EXAM - RIGHT EYE: OD_EXAM: NORMAL

## 2020-06-15 ASSESSMENT — PACHYMETRY: OS_CT(UM): 713

## 2020-06-15 NOTE — PROGRESS NOTES
"CC: Failed DSAEK OS/ ACIOL OS - s/p repeat DSAEK OS    HPI: Magali Macdonald is a 80 year old female s/p DSEK OS 5/9/18. H/o retinal detachment with repair and lensectomy with Dr. Maldonado. Patient had secondary ACIOL OS with Dr. Abraham. 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 Canchola who did a DSAEK OS. The patient then developed graft failure after 1 year, likely due to the ACIOL .    Interval Hx: POM#12 s/p DSAEK, ACIOL explant, Yamane left eye. States vision is stable, no pain, redness, or discharge. She is still not using any artificial tears. No new flashes, floaters diplopia. At last visit, patient was taken off of Timolol. \"Images from each eye not lining up, not seeing double because right eye is dominant.\" Things appear smaller in the left eye. Stopped wearing CTL because images looked distorted. She accidentally brought her old glasses today. Despite all of this, she is functioning fine.    Gtts:  Pred Acetate BID left eye  Latanoprost QHS OS  PFATs prn (states she is only rarely using)    POHx:  DSAEK, ACIOL explant, scleral fixated IOL, EDTA chelation left eye 6/25/19  DSEK left eye 5/9/2018 (Dr. Canchola)  ACIOL OS Dr. Abraham 10/15/14  Pars plana vitrectomy (PPV)/SBP/PPL/FGx OS 10/3/13 for recurrent RRD and cataract  Pars plana vitrectomy OS  (PPV)/FGx 6/2013 by Oli      Assessment & Plan    1. s/p DSAEK, ACIOL explant, scleral fixated IOL, EDTA chelation left eye 6/25/19   - Patient remains in good range at 16 since stopping Diamox and now since stopping brimonidine   - continue prednisolone acetate to BID OS    - continue latanoprost QHS OS   - increase preservative free artificial tears to using at least four times daily (using rarely currently).   - pachy slightly increased (713 today from 689 7 months ago), VA stable   - Has appointment with Dr. Coker for CTL fitting later this week   - Could consider placing plugs, but patient unsure if made a difference "    - Encouraged AT use    2. H/o CME OS   - History of chronic cystoid macular edema   - Return to Dr. Maldonado for f/u    3. ERM each eye   - Given symptoms of distortion and difference in image sizes, OCT today   - significant foveal distortion OD 2/2 ERM   - mild ERM without foveal distortion OS.    4. Pseudophakia left eye   - has scleral fixated lens, doing well    5. NS right eye   - 20/30-40; pt not bothered by changes in vision; plan to stabilize left eye first   - Satisfied with RGP    6. ON drusen    Follow up: 6 months [facility]in [facility]    --  Cookie Payne MD  Ophthalmology Resident, PGY-3    Attending Physician Attestation:  Complete documentation of historical and exam elements from today's encounter can be found in the full encounter summary report (not reduplicated in this progress note).  I personally obtained the chief complaint(s) and history of present illness.  I confirmed and edited as necessary the review of systems, past medical/surgical history, family history, social history, and examination findings as documented by others; and I examined the patient myself.  I personally reviewed the relevant tests, images, and reports as documented above.  I formulated and edited as necessary the assessment and plan and discussed the findings and management plan with the patient and family. I personally reviewed the ophthalmic test(s) associated with this encounter, agree with the interpretation(s) as documented by the resident/fellow, and have edited the corresponding report(s) as necessary. - Moises Rudd MD    --------------------------------------------------------------------------------------------------------------------------------------------  Pachymetry - Interpretation & Report  Indication: post corneal transplant (DSAEK)  Performed by: Moises Rudd MD  Reliability: good  Patient cooperation: good  Findings:   Left eye:  713 micrometers centrally   Interval Change, Assessment, & Impact on  treatment:   Left eye:  Thickened K edema   Signed: Moises Rudd MD 6/15/2020 4:28 PM      I personally spent great than 40min with the patient, of which >50% of the time was spent face to face with the patient, counseling and coordinating care with the patient. We discussed the complexity of her diagnosis, the need for further information prior to proceeding with yet another surgery, and the unknown prognosis for the patient at this time.    Moises Rudd MD

## 2020-06-17 ENCOUNTER — OFFICE VISIT (OUTPATIENT)
Dept: OPTOMETRY | Facility: CLINIC | Age: 80
End: 2020-06-17
Payer: COMMERCIAL

## 2020-06-17 DIAGNOSIS — H53.19 VISUAL DISTORTION: ICD-10-CM

## 2020-06-17 DIAGNOSIS — H52.13 MYOPIA OF BOTH EYES WITH ASTIGMATISM AND PRESBYOPIA: ICD-10-CM

## 2020-06-17 DIAGNOSIS — H52.31 ANISOMETROPIA AND ANISEIKONIA: Primary | ICD-10-CM

## 2020-06-17 DIAGNOSIS — H52.4 MYOPIA OF BOTH EYES WITH ASTIGMATISM AND PRESBYOPIA: ICD-10-CM

## 2020-06-17 DIAGNOSIS — H52.203 MYOPIA OF BOTH EYES WITH ASTIGMATISM AND PRESBYOPIA: ICD-10-CM

## 2020-06-17 DIAGNOSIS — H52.32 ANISOMETROPIA AND ANISEIKONIA: Primary | ICD-10-CM

## 2020-06-17 ASSESSMENT — REFRACTION_CURRENTRX
OD_SPHERE: -6.25
OD_SPHERE: -3.00
OD_BASECURVE: 7.85
OD_DIAMETER: 9.5
OD_BASECURVE: 7.85
OS_BASECURVE: 7.94
OD_ADDL_SPECS: BLUE
OD_DIAMETER: 8.5
OS_DIAMETER: 9.5
OS_SPHERE: -3.00

## 2020-06-17 ASSESSMENT — REFRACTION_MANIFEST
OD_AXIS: 085
OD_SPHERE: -7.00
OS_CYLINDER: +0.50
OS_AXIS: 135
OD_CYLINDER: +1.00
OS_SPHERE: -2.00

## 2020-06-17 ASSESSMENT — VISUAL ACUITY
OS_SC: 20/80+2
CORRECTION_TYPE: CONTACTS
METHOD: SNELLEN - LINEAR
OD_CC: 20/30+2

## 2020-06-17 ASSESSMENT — SLIT LAMP EXAM - LIDS
COMMENTS: NORMAL
COMMENTS: NORMAL

## 2020-06-17 ASSESSMENT — EXTERNAL EXAM - LEFT EYE: OS_EXAM: NORMAL

## 2020-06-17 ASSESSMENT — EXTERNAL EXAM - RIGHT EYE: OD_EXAM: NORMAL

## 2020-06-17 NOTE — PROGRESS NOTES
A/P  1.) Anisometropia and Aniseikonia  -Cataract right eye, IOL and DSAEK left eye  -Aniso symptoms improved with RGP but not resolved  -She has not worn a left lens in quite a while. Initial double vision (worse with monovision) - this may resolve with CL adaptation both eyes but can see orthoptic clinic for prism eval if diplopia persists  -Balance glasses okay to use for home use  -Increase diam as smaller diam decenters nasally    Order new pair and mail direct to pt. RTC prn with lens concerns, otherwise 1 year routine    Contact Lens Billing  V-Code:  - GP Spherical  Final Contact Lens Rx       Brand Base Curve Diameter Sphere Lens    Right Azra GP 7.94 9.5 -6.00 [place] ES BLUE    Left Azra GP 7.94 9.5 [place] ES GREEN         # of units: 2  Price per Unit: $80    This patient requires contact lenses that are medically necessary for either improvement in vision over spectacles, support of the ocular surface, or other therapeutic benefit. These are not cosmetic contact lenses.     Encounter Diagnoses   Name Primary?     Anisometropia and aniseikonia Yes     Myopia of both eyes with astigmatism and presbyopia      Visual distortion

## 2020-07-06 DIAGNOSIS — H40.052 BORDERLINE GLAUCOMA OF LEFT EYE WITH OCULAR HYPERTENSION: ICD-10-CM

## 2020-07-06 RX ORDER — LATANOPROST 50 UG/ML
1 SOLUTION/ DROPS OPHTHALMIC AT BEDTIME
Qty: 2.5 ML | Refills: 6 | Status: SHIPPED | OUTPATIENT
Start: 2020-07-06 | End: 2021-05-20

## 2020-07-06 NOTE — TELEPHONE ENCOUNTER
Latanoprost Ophthalmic Solution 0.005 %     Requested directions: Place 1 drop Into the left eye At Bedtime - Left Eye   Current directions on the medication list:   Place 1 drop Into the left eye At Bedtime - Left Eye     Last Written Prescription Date:  9/3/2019  Last Fill Quantity: 2.5,   # refills: 5    Last Office Visit: 6/15/2020  Future Office visit: None    Attending Provider:    Moises Rudd MD   Ophthalmology      Last Clinic Note: 6/15/2020       Assessment & Plan     1. s/p DSAEK, ACIOL explant, scleral fixated IOL, EDTA chelation left eye 6/25/19              - Patient remains in good range at 16 since stopping Diamox and now since stopping brimonidine              - continue prednisolone acetate to BID OS               - continue latanoprost QHS OS              - increase preservative free artificial tears to using at least four times daily (using rarely currently).              - pachy slightly increased (713 today from 689 7 months ago), VA stable              - Has appointment with Dr. Coker for CTL fitting later this week              - Could consider placing plugs, but patient unsure if made a difference               - Encouraged AT use     2. H/o CME OS              - History of chronic cystoid macular edema              - Return to Dr. Maldonado for f/u     3. ERM each eye              - Given symptoms of distortion and difference in image sizes, OCT today              - significant foveal distortion OD 2/2 ERM              - mild ERM without foveal distortion OS.     4. Pseudophakia left eye              - has scleral fixated lens, doing well     5. NS right eye              - 20/30-40; pt not bothered by changes in vision; plan to stabilize left eye first              - Satisfied with RGP     6. ON drusen     Follow up: 6 months [facility]  Cookie Payne MD  Ophthalmology Resident, PGY-3    2.5 mL, 6 Refills sent to pharm 7/6/2020    Huong Navarro RN  Central Triage Red  Flags/Med Refills

## 2020-11-14 ENCOUNTER — HEALTH MAINTENANCE LETTER (OUTPATIENT)
Age: 80
End: 2020-11-14

## 2021-04-03 ENCOUNTER — HEALTH MAINTENANCE LETTER (OUTPATIENT)
Age: 81
End: 2021-04-03

## 2021-04-13 DIAGNOSIS — Z94.7 POST CORNEAL TRANSPLANT: ICD-10-CM

## 2021-04-14 RX ORDER — PREDNISOLONE ACETATE 10 MG/ML
1 SUSPENSION/ DROPS OPHTHALMIC 2 TIMES DAILY
Qty: 10 ML | Refills: 0 | Status: SHIPPED | OUTPATIENT
Start: 2021-04-14 | End: 2021-06-18

## 2021-04-14 NOTE — TELEPHONE ENCOUNTER
Medication requested: prednisoLONE Acetate Ophthalmic Suspension 1 %    Requested directions: INSTILL ONE DROP INTO THE LEFT EYE TWICE DAILY  Current directions on the medication list: Same    Last Written Prescription Date:  11-30-20  Last Fill Quantity: 10 ml,   # refills: 1    Last Office Visit: 6-15-20 ( RTC 6 M)  Future Office visit: none    Attending Provider: Tobin  Last Clinic Note:  - continue prednisolone acetate to BID OS     Routing refill request to provider for review/approval because:    Not on protocol  Requires provider review

## 2021-05-19 DIAGNOSIS — H40.052 BORDERLINE GLAUCOMA OF LEFT EYE WITH OCULAR HYPERTENSION: ICD-10-CM

## 2021-05-20 RX ORDER — LATANOPROST 50 UG/ML
1 SOLUTION/ DROPS OPHTHALMIC AT BEDTIME
Qty: 2.5 ML | Refills: 2 | Status: SHIPPED | OUTPATIENT
Start: 2021-05-20 | End: 2021-07-12

## 2021-05-20 NOTE — TELEPHONE ENCOUNTER
----- Message from Odilia Hays sent at 6/21/2019  2:12 PM CDT -----  Contact: self  [phone]  .Type: Patient Call Back    Who called: self     What is the request in detail: Pt called to Cx  Surgery that scheduled on 06/28    Can the clinic reply by [facility]? Call back     Would the patient rather a call back or a response via [facility]? 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 s/p DSAEK, ACIOL explant, scleral fixated IOL, EDTA chelation left eye 6/25/19              - Patient remains in good range at 16 since stopping Diamox and now since stopping brimonidine              - continue prednisolone acetate to BID OS               - continue latanoprost QHS OS              - increase preservative free artificial tears to using at least four times daily (using rarely currently).              - pachy slightly increased (713 today from 689 7 months ago), VA stable              - Has appointment with Dr. Coker for CTL fitting later this week              - Could consider placing plugs, but patient unsure if made a difference               - Encouraged AT use     2. H/o CME OS              - History of chronic cystoid macular edema              - Return to Dr. Maldonado for f/u     3. ERM each eye              - Given symptoms of distortion and difference in image sizes, OCT today              - significant foveal distortion OD 2/2 ERM              - mild ERM without foveal distortion OS.     4. Pseudophakia left eye              - has scleral fixated lens, doing well     5. NS right eye              - 20/30-40; pt not bothered by changes in vision; plan to stabilize left eye first              - Satisfied with RGP     6. ON drusen     Follow up: 6 months [facility]    Refill to pharmacy. Scheduling has been notified to contact the pt for appointment.

## 2021-06-18 DIAGNOSIS — Z94.7 POST CORNEAL TRANSPLANT: ICD-10-CM

## 2021-06-18 NOTE — TELEPHONE ENCOUNTER
prednisoLONE acetate (PRED FORTE) 1 % ophthalmic suspension    Requested directions:  Place 1 drop Into the left eye 2 times daily For additional refills, please schedule a follow-up appointment at [phone]. - Left Eye  Current directions on the medication list:  Place 1 drop Into the left eye 2 times daily For additional refills, please schedule a follow-up appointment at [phone]. - Left Eye    Last Written Prescription Date:   4/14/2021  Last Fill Quantity: 10,   # refills: 0    Last Office Visit: 6/17/2020  Future Office visit: 7/12/2021    Attending Provider:     Geeta Coker, TOMEKA  Optometry     Last Clinic Note:  6/17/2020    A/P  1.) Anisometropia and Aniseikonia  -Cataract right eye, IOL and DSAEK left eye  -Aniso symptoms improved with RGP but not resolved  -She has not worn a left lens in quite a while. Initial double vision (worse with monovision) - this may resolve with CL adaptation both eyes but can see orthoptic clinic for prism eval if diplopia persists  -Balance glasses okay to use for home use  -Increase diam as smaller diam decenters nasally     Order new pair and mail direct to pt. RTC prn with lens concerns, otherwise 1 year routine       Routing refill request to provider for review/approval because:  Refer to Provider for approval and refills for Pt care.       Huong Navarro RN  Central Triage Red Flags/Med Refills

## 2021-06-21 RX ORDER — PREDNISOLONE ACETATE 10 MG/ML
1 SUSPENSION/ DROPS OPHTHALMIC 2 TIMES DAILY
Qty: 10 ML | Refills: 0 | Status: SHIPPED | OUTPATIENT
Start: 2021-06-21 | End: 2021-07-12

## 2021-06-28 DIAGNOSIS — H47.323 DRUSEN OF BOTH OPTIC DISCS: ICD-10-CM

## 2021-06-28 DIAGNOSIS — H35.373 EPIRETINAL MEMBRANE (ERM) OF BOTH EYES: Primary | ICD-10-CM

## 2021-07-12 ENCOUNTER — OFFICE VISIT (OUTPATIENT)
Dept: OPHTHALMOLOGY | Facility: CLINIC | Age: 81
End: 2021-07-12
Attending: OPHTHALMOLOGY
Payer: COMMERCIAL

## 2021-07-12 DIAGNOSIS — Z94.7 POST CORNEAL TRANSPLANT: Primary | ICD-10-CM

## 2021-07-12 DIAGNOSIS — H18.12 BULLOUS KERATOPATHY, LEFT EYE: ICD-10-CM

## 2021-07-12 DIAGNOSIS — H35.373 EPIRETINAL MEMBRANE (ERM), BILATERAL: ICD-10-CM

## 2021-07-12 DIAGNOSIS — H40.052 BORDERLINE GLAUCOMA OF LEFT EYE WITH OCULAR HYPERTENSION: ICD-10-CM

## 2021-07-12 DIAGNOSIS — H43.811 VITREORETINAL DEGENERATION OF RIGHT EYE: Primary | ICD-10-CM

## 2021-07-12 DIAGNOSIS — H18.20 CORNEAL EDEMA OF LEFT EYE: ICD-10-CM

## 2021-07-12 DIAGNOSIS — H35.373 EPIRETINAL MEMBRANE (ERM) OF BOTH EYES: ICD-10-CM

## 2021-07-12 DIAGNOSIS — H47.323 DRUSEN OF BOTH OPTIC DISCS: ICD-10-CM

## 2021-07-12 PROCEDURE — 999N000103 HC STATISTIC NO CHARGE FACILITY FEE

## 2021-07-12 PROCEDURE — 92014 COMPRE OPH EXAM EST PT 1/>: CPT | Mod: GC | Performed by: OPHTHALMOLOGY

## 2021-07-12 PROCEDURE — 76514 ECHO EXAM OF EYE THICKNESS: CPT | Performed by: OPHTHALMOLOGY

## 2021-07-12 PROCEDURE — 99215 OFFICE O/P EST HI 40 MIN: CPT | Mod: 25 | Performed by: OPHTHALMOLOGY

## 2021-07-12 PROCEDURE — 92134 CPTRZ OPH DX IMG PST SGM RTA: CPT | Performed by: OPHTHALMOLOGY

## 2021-07-12 PROCEDURE — G0463 HOSPITAL OUTPT CLINIC VISIT: HCPCS

## 2021-07-12 RX ORDER — PREDNISOLONE ACETATE 10 MG/ML
1 SUSPENSION/ DROPS OPHTHALMIC 2 TIMES DAILY
Qty: 10 ML | Refills: 0 | Status: SHIPPED | OUTPATIENT
Start: 2021-07-12 | End: 2021-11-12

## 2021-07-12 RX ORDER — LATANOPROST 50 UG/ML
1 SOLUTION/ DROPS OPHTHALMIC AT BEDTIME
Qty: 2.5 ML | Refills: 2 | Status: SHIPPED | OUTPATIENT
Start: 2021-07-12 | End: 2021-11-12

## 2021-07-12 ASSESSMENT — TONOMETRY
OS_IOP_MMHG: 19
IOP_METHOD: TONOPEN
OD_IOP_MMHG: 14
IOP_METHOD: TONOPEN
OS_IOP_MMHG: 19
OD_IOP_MMHG: 14

## 2021-07-12 ASSESSMENT — PACHYMETRY
OS_CT(UM): 715
EXAM_DATE: 7/12/2021

## 2021-07-12 ASSESSMENT — CONF VISUAL FIELD
OS_NORMAL: 1
OS_NORMAL: 1
OD_NORMAL: 1
OD_NORMAL: 1

## 2021-07-12 ASSESSMENT — VISUAL ACUITY
METHOD: SNELLEN - LINEAR
OS_CC: 20/50
OS_PH_SC: 20/40
OS_PH_CC: 20/40
OD_CC+: -2
CORRECTION_TYPE: CONTACTS
OD_CC: 20/25
METHOD: SNELLEN - LINEAR
OD_CC+: -2
OS_CC: 20/50
OD_CC: 20/25
CORRECTION_TYPE: CONTACTS

## 2021-07-12 ASSESSMENT — SLIT LAMP EXAM - LIDS
COMMENTS: NORMAL

## 2021-07-12 ASSESSMENT — EXTERNAL EXAM - LEFT EYE
OS_EXAM: NORMAL
OS_EXAM: NORMAL

## 2021-07-12 ASSESSMENT — REFRACTION_WEARINGRX
OS_AXIS: 110
OS_SPHERE: -2.00
SPECS_TYPE: PAL
OD_AXIS: 035
OD_ADD: +3.00
OD_CYLINDER: +0.50
OD_SPHERE: -6.00
OS_CYLINDER: +3.00

## 2021-07-12 ASSESSMENT — EXTERNAL EXAM - RIGHT EYE
OD_EXAM: NORMAL
OD_EXAM: NORMAL

## 2021-07-12 ASSESSMENT — CUP TO DISC RATIO
OD_RATIO: 0.2
OD_RATIO: 0.2
OS_RATIO: 0.1
OS_RATIO: 0.1

## 2021-07-12 NOTE — NURSING NOTE
Chief Complaints and History of Present Illnesses   Patient presents with     Cornea Transplant Follow Up     Chief Complaint(s) and History of Present Illness(es)     Cornea Transplant Follow Up     Laterality: left eye    Onset: 1 year ago              Comments     Pt. States that she is doing well. No change in VA BE. RE has been mattering occasionally over the last 2 weeks. No flashes or floaters BE. No change in VA BE.  Raquel Arenas COT 8:23 AM July 12, 2021

## 2021-07-12 NOTE — NURSING NOTE
Chief Complaints and History of Present Illnesses   Patient presents with     Epiretinal Membrane Follow Up     Chief Complaint(s) and History of Present Illness(es)     Epiretinal Membrane Follow Up     Laterality: both eyes    Onset: 1 year ago              Comments     Pt. States that she is doing well. No change in VA BE. RE has been mattering occasionally over the last 2 weeks. No flashes or floaters BE. No change in VA BE.  Raquel Arenas COT 8:23 AM July 12, 2021

## 2021-07-12 NOTE — PROGRESS NOTES
"CC: Failed DSAEK OS/ ACIOL OS - s/p repeat DSAEK OS    HPI: Magali Macdonald is a 81 year old female s/p DSEK OS 5/9/18. H/o retinal detachment with repair and lensectomy with Dr. Maldonado. Patient had secondary ACIOL OS with Dr. Abraham. 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 Canchola who did a DSAEK OS. The patient then developed graft failure after 1 year, likely due to the ACIOL .    Interval Hx: s/p DSAEK, ACIOL explant, Yamane left eye. States vision is stable, no pain, redness, or discharge. She is still not using any artificial tears. No new flashes, floaters diplopia. At last visit, patient was taken off of Timolol. \"Images from each eye not lining up, not seeing double because right eye is dominant.\" Things appear smaller in the left eye. Stopped wearing CTL because images looked distorted. She accidentally brought her old glasses today. Despite all of this, she is functioning fine.    Gtts:  Pred Acetate BID left eye  Latanoprost QHS OS  PFATs prn (states she is only rarely using)    POHx:  DSAEK, ACIOL explant, scleral fixated IOL, EDTA chelation left eye 6/25/19  DSEK left eye 5/9/2018 (Dr. Canchola)  ACIOL OS Dr. Abraham 10/15/14  Pars plana vitrectomy (PPV)/SBP/PPL/FGx OS 10/3/13 for recurrent RRD and cataract  Pars plana vitrectomy OS  (PPV)/FGx 6/2013 by Oli      Assessment & Plan    # s/p DSAEK, ACIOL explant, scleral fixated IOL, EDTA chelation left eye 6/25/19   - continue prednisolone acetate to BID OS    - continue latanoprost QHS OS   - consider increase preservative free artificial tears to using at least four times daily (using rarely currently) if symptoms worsen   - pachy slightly increased (715 today --> 713, from 689 7 months ago), VA stable, monitor   - Encouraged AT use    # H/o CME OS   - History of chronic cystoid macular edema   - Return to Dr. Maldonado for f/u    # ERM each eye   - Given symptoms of distortion and difference in image sizes,    - " Pt seen by Dr. Ramirez and noted to be stable.    # Pseudophakia left eye   - has scleral fixated lens, doing well    # NS right eye   - 20/25-; pt not bothered by changes in vision; plan to stabilize left eye first   - Satisfied with RGP    # ON drusen    Follow up: 6 months [facility] w/ VT + Pachy BE      ALSO SEES:  Dr Escobar seen today (7/12/21) - Retina - see as scheduled.    Phillip Diaz MD  Fellow, Cornea & External Disease  Department of Ophthalmology  [facility]    Attending Physician Attestation:  Complete documentation of historical and exam elements from today's encounter can be found in the full encounter summary report (not reduplicated in this progress note).  I personally obtained the chief complaint(s) and history of present illness.  I confirmed and edited as necessary the review of systems, past medical/surgical history, family history, social history, and examination findings as documented by others; and I examined the patient myself.  I personally reviewed the relevant tests, images, and reports as documented above.  I formulated and edited as necessary the assessment and plan and discussed the findings and management plan with the patient and family. - Moises Rudd MD    --------------------------------------------------------------------------------------------------------------------------------------------  Pachymetry - Interpretation & Report  Indication: post corneal transplant, r/o rejection/bullous keratopathy OS  Performed by: Moises Rudd MD  Reliability: good  Patient cooperation: good  Findings:   Left eye:  715 micrometers centrally   Interval Change, Assessment, & Impact on treatment:   Left eye:  Stable, no change in K edema  Signed: Moises Rudd MD 7/12/2021 10:44 AM      I personally spent great than 40min with the patient, of which >50% of the time was spent face to face with the patient, counseling and coordinating care with the patient. We discussed the  complexity of her diagnosis, the need for further information prior to proceeding with yet another surgery, and the unknown prognosis for the patient at this time.    Moises Rudd MD

## 2021-07-12 NOTE — PROGRESS NOTES
CC: CME after RD OS    INTERVAL HISTORY -  Feels like eye is stable. No new complaints today.  SANDRA with me 2018    PMH:   81 year old here for follow up for h/o Retinal detachment left eye c/b CME. Post op course included placement of ACIOL which likely led to PBK requiring DSAEK. After failure of the first DSAEK, a second was performed along with explantation of the ACIOL and placement of a secondary IOL (Yamane technique).     PAST OCULAR SURGERY  PPV/SBP/PPL/FGX OS 10/3/13 (DDK)   PPV/FGx OS 6/2013 (Oli)  ACIOL OS 10/15/14 (Abraham)  DSAEK OS 2/2 PBK 5/9/18 ([facility])  DSAEK #2+ACIOL explant + scleral fixated IOL + chelation O.S 6/25/2019 ([facility])      RETINAL IMAGING  OCT 7/12/2021  OD- stable mod ERM with traction and distortion of fovea  OS - mild ERM  with preservation of foveal depression, no CME, joselo-foveal atrophy of the IS/OS junction       ASSESSMENT & PLAN    #.  H/o RD repair OS   - surgery x 2 in 2013   - retina flat   - VA 20/30 in 2015      #.  Epiretinal membrane OD   - minimal visual symptoms, micropsia   - VA right eye 20/25   - observe      #. ERM OS   - mild not significant likely    - VA was 20/30 in 2015   - prior RD likely limiting vision   - has image minification but doubt benefit from surgery   - observe      #. 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  I personally obtained the chief complaint(s) and history of present illness.  I confirmed and edited as necessary the review of systems, past medical/surgical history, family history, social history, and examination findings as documented by others; and I examined the patient myself.  I personally reviewed the relevant tests, images, and reports as documented above.  I personally reviewed the ophthalmic test(s) associated with this encounter, agree with the interpretation(s) as documented by the resident/fellow, and have edited the corresponding report(s) as necessary.   I formulated and edited as necessary the assessment and plan and discussed the findings and management plan with the patient and family    Kandi Maldonado MD, PhD  , Vitreoretinal Surgery  Department of Ophthalmology  [facility]

## 2021-09-12 ENCOUNTER — HEALTH MAINTENANCE LETTER (OUTPATIENT)
Age: 81
End: 2021-09-12

## 2021-11-12 DIAGNOSIS — H40.052 BORDERLINE GLAUCOMA OF LEFT EYE WITH OCULAR HYPERTENSION: ICD-10-CM

## 2021-11-12 DIAGNOSIS — Z94.7 POST CORNEAL TRANSPLANT: ICD-10-CM

## 2021-11-12 RX ORDER — LATANOPROST 50 UG/ML
1 SOLUTION/ DROPS OPHTHALMIC AT BEDTIME
Qty: 2.5 ML | Refills: 5 | Status: SHIPPED | OUTPATIENT
Start: 2021-11-12 | End: 2022-03-21

## 2021-11-12 RX ORDER — PREDNISOLONE ACETATE 10 MG/ML
1 SUSPENSION/ DROPS OPHTHALMIC 2 TIMES DAILY
Qty: 10 ML | Refills: 5 | Status: SHIPPED | OUTPATIENT
Start: 2021-11-12 | End: 2022-03-21

## 2021-11-12 NOTE — TELEPHONE ENCOUNTER
Rx for prednisolone eye drop and latanoprost sent    Pt aware and scheduled appt with Dr. Rudd in first available in march 2022    Pt aware to call for any sudden vision changes/symptoms between visits    Jason Ortiz RN 1:26 PM 11/12/21

## 2021-11-12 NOTE — TELEPHONE ENCOUNTER
prednisoLONE acetate (PRED FORTE) 1 % ophthalmic suspension  Last Written Prescription Date:  7/12/2021  Last Fill Quantity: 10,   # refills: 0  Last Office Visit : 7/12/2021  Future Office visit:  None     Moises Rudd MD  Ophthalmology      Assessment & Plan     # s/p DSAEK, ACIOL explant, scleral fixated IOL, EDTA chelation left eye 6/25/19              - continue prednisolone acetate to BID OS               - continue latanoprost QHS OS              - consider increase preservative free artificial tears to using at least four times daily (using rarely currently) if symptoms worsen              - pachy slightly increased (715 today --> 713, from 689 7 months ago), VA stable, monitor              - Encouraged AT use     # H/o CME OS              - History of chronic cystoid macular edema              - Return to Dr. Maldonado for f/u     # ERM each eye              - Given symptoms of distortion and difference in image sizes,               - Pt seen by Dr. Ramirez and noted to be stable.     # Pseudophakia left eye              - has scleral fixated lens, doing well     # NS right eye              - 20/25-; pt not bothered by changes in vision; plan to stabilize left eye first              - Satisfied with RGP     # ON drusen     Routing refill request to provider for review/approval because:  Second request due for a 6 month follow up appointment  No appointment scheduled.    Please call Pt to schedule    latanoprost (XALATAN) 0.005 % ophthalmic solution  Last Written Prescription Date:  7/12/2021  Last Fill Quantity: 10,   # refills: 0  Last Office Visit : 7/12/2021  Future Office visit:  None     Moises Rudd MD  Ophthalmology      Assessment & Plan     # s/p DSAEK, ACIOL explant, scleral fixated IOL, EDTA chelation left eye 6/25/19              - continue prednisolone acetate to BID OS               - continue latanoprost QHS OS              - consider increase preservative free artificial  tears to using at least four times daily (using rarely currently) if symptoms worsen              - pachy slightly increased (715 today --> 713, from 689 7 months ago), VA stable, monitor              - Encouraged AT use     # H/o CME OS              - History of chronic cystoid macular edema              - Return to Dr. Maldonado for f/u     # ERM each eye              - Given symptoms of distortion and difference in image sizes,               - Pt seen by Dr. Ramirez and noted to be stable.     # Pseudophakia left eye              - has scleral fixated lens, doing well     # NS right eye              - 20/25-; pt not bothered by changes in vision; plan to stabilize left eye first              - Satisfied with RGP     # ON drusen     Follow up: 6 months [facility] w/ VT + Pachy BE  Second request due for a 6 month follow up appointment  No appointment scheduled.    Please call Pt to schedule    Huong Navarro RN  Central Triage Red Flags/Med Refills

## 2022-03-21 ENCOUNTER — OFFICE VISIT (OUTPATIENT)
Dept: OPHTHALMOLOGY | Facility: CLINIC | Age: 82
End: 2022-03-21
Attending: OPHTHALMOLOGY
Payer: COMMERCIAL

## 2022-03-21 DIAGNOSIS — Z94.7 POST CORNEAL TRANSPLANT: ICD-10-CM

## 2022-03-21 DIAGNOSIS — H40.052 BORDERLINE GLAUCOMA OF LEFT EYE WITH OCULAR HYPERTENSION: ICD-10-CM

## 2022-03-21 DIAGNOSIS — H18.12 BULLOUS KERATOPATHY OF LEFT EYE: Primary | ICD-10-CM

## 2022-03-21 PROCEDURE — 76514 ECHO EXAM OF EYE THICKNESS: CPT | Performed by: OPHTHALMOLOGY

## 2022-03-21 PROCEDURE — 99215 OFFICE O/P EST HI 40 MIN: CPT | Mod: GC | Performed by: OPHTHALMOLOGY

## 2022-03-21 PROCEDURE — G0463 HOSPITAL OUTPT CLINIC VISIT: HCPCS

## 2022-03-21 RX ORDER — PREDNISOLONE ACETATE 10 MG/ML
1 SUSPENSION/ DROPS OPHTHALMIC 2 TIMES DAILY
Qty: 10 ML | Refills: 5 | Status: SHIPPED | OUTPATIENT
Start: 2022-03-21 | End: 2023-04-25

## 2022-03-21 RX ORDER — LATANOPROST 50 UG/ML
1 SOLUTION/ DROPS OPHTHALMIC AT BEDTIME
Qty: 2.5 ML | Refills: 5 | Status: SHIPPED | OUTPATIENT
Start: 2022-03-21 | End: 2023-01-25

## 2022-03-21 ASSESSMENT — CONF VISUAL FIELD
METHOD: COUNTING FINGERS
OS_NORMAL: 1
OD_NORMAL: 1

## 2022-03-21 ASSESSMENT — VISUAL ACUITY
OS_CC+: -2
OS_PH_SC+: -2
OS_PH_SC: 20/30
OS_CC: 20/60
METHOD: SNELLEN - LINEAR
CORRECTION_TYPE: CONTACTS
OD_CC: 20/30
OS_SC: 20/60
OS_SC+: -2/+2

## 2022-03-21 ASSESSMENT — TONOMETRY
OD_IOP_MMHG: RGP
IOP_METHOD: ICARE
OS_IOP_MMHG: 18

## 2022-03-21 ASSESSMENT — EXTERNAL EXAM - LEFT EYE: OS_EXAM: NORMAL

## 2022-03-21 ASSESSMENT — SLIT LAMP EXAM - LIDS
COMMENTS: NORMAL
COMMENTS: NORMAL

## 2022-03-21 ASSESSMENT — PACHYMETRY: OS_CT(UM): 702

## 2022-03-21 ASSESSMENT — EXTERNAL EXAM - RIGHT EYE: OD_EXAM: NORMAL

## 2022-03-21 NOTE — PROGRESS NOTES
"CC: Failed DSAEK OS/ ACIOL OS - s/p repeat DSAEK OS    HPI: Magali Macdonald is a 81 year old female s/p DSEK OS 5/9/18. H/o retinal detachment with repair and lensectomy with Dr. Maldonado. Patient had secondary ACIOL OS with Dr. Abraham. 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 Canchola who did a DSAEK OS. The patient then developed graft failure after 1 year, likely due to the ACIOL. Now s/p repeat DSAEK.    Interval Hx: States vision is stable, no pain, redness, or discharge. No new flashes, floaters diplopia. Persistent disturbance of images not lining up, but denies double which she attributes to right eye being dominant and \"doing all the work.\" Patient would like to get out of readers, previously used monovision (left eye distance, right near). She is interested in possible monovision RGP for the left eye    Gtts:  Pred Acetate BID left eye  Latanoprost QHS OS  PFATs prn (states she is only rarely using)    POHx:  DSAEK, ACIOL explant, scleral fixated IOL, EDTA chelation left eye 6/25/19  DSEK left eye 5/9/2018 (Dr. Canchola)  ACIOL OS Dr. Abraham 10/15/14  Pars plana vitrectomy (PPV)/SBP/PPL/FGx OS 10/3/13 for recurrent RRD and cataract  Pars plana vitrectomy OS  (PPV)/FGx 6/2013 by Oli    Assessment:    # S/p DSAEK, ACIOL explant, scleral fixated IOL, EDTA chelation left eye 6/25/19    PLAN:   - continue prednisolone acetate to BID OS    - continue latanoprost QHS OS   - consider increase preservative free artificial tears to using at least four times daily (using rarely currently) if symptoms worsen   - pachy slightly improved (715 -> 702 today)   - VA stable, monitor   - okay to try RGP OS for monovision; patient also motivated to try multifocal contact lenses - refer to Dr. Coker    # H/o CME OS   - History of chronic cystoid macular edema, absent at last retina appointment (7/2021)   - Follow up with Dr. Maldonado in 7/2022    # ERM each eye   - Given symptoms of " distortion and difference in image sizes   - Noted to be stable at last retina appointment    - Follow up with Dr. Maldonado in 7/2022w     # Pseudophakia left eye   - Recommend trial of monovision with Dr. Coker (target near OS)    # NS right eye   - 20/30; pt not bothered by changes in vision   - Satisfied with RGP    # ON drusen    Follow up: 6 months [facility] w/ VT + Pachy LE, call if problems sooner to clinic.    ALSO SEES:  Dr Joel Frost MD  Ophthalmology Resident, PGY-3    Attending Physician Attestation:  Complete documentation of historical and exam elements from today's encounter can be found in the full encounter summary report (not reduplicated in this progress note).  I personally obtained the chief complaint(s) and history of present illness.  I confirmed and edited as necessary the review of systems, past medical/surgical history, family history, social history, and examination findings as documented by others; and I examined the patient myself.  I personally reviewed the relevant tests, images, and reports as documented above.  I formulated and edited as necessary the assessment and plan and discussed the findings and management plan with the patient and family. - Moises Rudd MD    --------------------------------------------------------------------------------------------------------------------------------------------  Pachymetry - Interpretation & Report  Indication: post-corneal transplant, r/o bullous keratopathy/rejection OS  Performed by: Moises Rudd MD  Reliability: good  Patient cooperation: good  Findings:   Left eye:  702 micrometers centrally   Interval Change, Assessment, & Impact on treatment:   Left eye:  Slightly improved K thickness  Signed: Moises Rudd MD 3/21/2022 9:44 AM      I personally spent great than 40min with the patient, of which >50% of the time was spent face to face with the patient, counseling and coordinating care with  the patient. We discussed the complexity of her diagnosis, the need for further information prior to proceeding with yet another surgery, and the unknown prognosis for the patient at this time.    Moises Rudd MD

## 2022-04-24 ENCOUNTER — HEALTH MAINTENANCE LETTER (OUTPATIENT)
Age: 82
End: 2022-04-24

## 2022-04-27 ENCOUNTER — OFFICE VISIT (OUTPATIENT)
Dept: OPTOMETRY | Facility: CLINIC | Age: 82
End: 2022-04-27
Payer: COMMERCIAL

## 2022-04-27 DIAGNOSIS — Z94.7 POST CORNEAL TRANSPLANT: ICD-10-CM

## 2022-04-27 DIAGNOSIS — H52.32 ANISOMETROPIA AND ANISEIKONIA: Primary | ICD-10-CM

## 2022-04-27 DIAGNOSIS — H52.31 ANISOMETROPIA AND ANISEIKONIA: Primary | ICD-10-CM

## 2022-04-27 ASSESSMENT — VISUAL ACUITY
METHOD: SNELLEN - LINEAR
CORRECTION_TYPE: CONTACTS
OD_CC: 20/30-2
OS_SC: 20/80

## 2022-04-27 ASSESSMENT — REFRACTION_CURRENTRX
OD_SPHERE: -3.00
OS_DIAMETER: 9.5
OS_BASECURVE: 7.94
OD_DIAMETER: 9.5
OS_SPHERE: PLANO
OD_BASECURVE: 7.94
OD_SPHERE: -6.00
OD_BASECURVE: 7.85
OS_DIAMETER: 9.5
OS_BASECURVE: 7.94
OD_DIAMETER: 9.5
OS_SPHERE: -3.00

## 2022-04-27 ASSESSMENT — TONOMETRY
IOP_METHOD: ICARE
OS_IOP_MMHG: 26
OD_IOP_MMHG: 22

## 2022-04-28 ASSESSMENT — REFRACTION_CURRENTRX
OS_BRAND: BLANCHARD GP
OD_BRAND: BLANCHARD GP

## 2022-04-28 ASSESSMENT — EXTERNAL EXAM - LEFT EYE: OS_EXAM: NORMAL

## 2022-04-28 ASSESSMENT — SLIT LAMP EXAM - LIDS
COMMENTS: NORMAL
COMMENTS: NORMAL

## 2022-04-28 ASSESSMENT — EXTERNAL EXAM - RIGHT EYE: OD_EXAM: NORMAL

## 2022-04-28 NOTE — PROGRESS NOTES
A/P  1.) Anisometropia and Aniseikonia  -Cataract right eye, IOL and DSAEK left eye  -Right eye distance, doing well but needs new lens. BCVA 20/25- (limited by cataract)  -Left eye s/p DSAEK, interested in trying monovision. Goal for improved near vision but still willing to wear her readers    Order new pair and mail direct to pt. RTC prn with lens concerns, otherwise 1 year routine    Contact Lens Billing  V-Code:  - GP Spherical  Final Contact Lens Rx       Brand Base Curve Diameter Sphere Lens Addl. Specs    Right [place] GP 7.94 9.5 -6.25 [place] ES BLUE BLUE    Left [facility] 7.94 9.5 +1.50 [place] ES GREEN GREEN         # of units: 2  Price per Unit: $80    This patient requires contact lenses that are medically necessary for either improvement in vision over spectacles, support of the ocular surface, or other therapeutic benefit. These are not cosmetic contact lenses.     Encounter Diagnoses   Name Primary?     Anisometropia and aniseikonia Yes     Post corneal transplant

## 2022-05-05 ENCOUNTER — TELEPHONE (OUTPATIENT)
Dept: OPTOMETRY | Facility: CLINIC | Age: 82
End: 2022-05-05
Payer: COMMERCIAL

## 2022-05-05 NOTE — TELEPHONE ENCOUNTER
[facility] Call Center    Phone Message    May a detailed message be left on voicemail: yes     Reason for Call: Other: Pt received her contact lense in the mail today and the right contact lens doesn't look right, Pt states she is not able to send her photo of the contact lens via My chart, per Pt.  Pt would like to discuss this with Dr Coker.  Thank you.    Action Taken: Message routed to:  [facility] ([facility] EYE    Travel Screening: Not Applicable

## 2022-05-06 NOTE — TELEPHONE ENCOUNTER
SWP - lens seems to have optic zone with lenticular (shape is different per pt) and when she puts it in it is very blurry    Suspect lab sent wrong long. She will drop it off at [facility] so I can check it on Monday. Will reorder correct lens ASAP if the lens is made wrong.

## 2022-05-09 NOTE — TELEPHONE ENCOUNTER
Inspected pt's lenses. Right lens had two stuck together. One correct lens (7.94/-6.25) and one smaller lens that measured approx -6.50/-8.2bc/8.0 diam.     Removed stuck lens. Verified bc/power/diam correct on R and L lens.     SWP - will mail back out to re-try    [employer] tracking [id number]

## 2022-07-30 ENCOUNTER — APPOINTMENT (OUTPATIENT)
Dept: GENERAL RADIOLOGY | Facility: CLINIC | Age: 82
End: 2022-07-30
Attending: INTERNAL MEDICINE
Payer: COMMERCIAL

## 2022-07-30 ENCOUNTER — APPOINTMENT (OUTPATIENT)
Dept: ULTRASOUND IMAGING | Facility: CLINIC | Age: 82
End: 2022-07-30
Attending: INTERNAL MEDICINE
Payer: COMMERCIAL

## 2022-07-30 ENCOUNTER — HOSPITAL ENCOUNTER (OUTPATIENT)
Facility: CLINIC | Age: 82
Setting detail: OBSERVATION
Discharge: HOME | End: 2022-08-02
Attending: INTERNAL MEDICINE | Admitting: INTERNAL MEDICINE
Payer: COMMERCIAL

## 2022-07-30 DIAGNOSIS — R74.8 ELEVATED LIVER ENZYMES: ICD-10-CM

## 2022-07-30 DIAGNOSIS — R74.02 ELEVATION OF LEVELS OF LACTIC ACID DEHYDROGENASE (LDH): ICD-10-CM

## 2022-07-30 DIAGNOSIS — Z20.822 CONTACT WITH AND (SUSPECTED) EXPOSURE TO COVID-19: ICD-10-CM

## 2022-07-30 DIAGNOSIS — D72.810 LYMPHOPENIA: ICD-10-CM

## 2022-07-30 DIAGNOSIS — E03.9 HYPOTHYROIDISM, UNSPECIFIED TYPE: Primary | ICD-10-CM

## 2022-07-30 DIAGNOSIS — D69.6 THROMBOCYTOPENIA (H): ICD-10-CM

## 2022-07-30 DIAGNOSIS — R74.01 TRANSAMINITIS: ICD-10-CM

## 2022-07-30 DIAGNOSIS — A79.82 ANAPLASMOSIS (A. PHAGOCYTOPHILUM): ICD-10-CM

## 2022-07-30 DIAGNOSIS — R53.83 OTHER FATIGUE: ICD-10-CM

## 2022-07-30 DIAGNOSIS — R51.9 NONINTRACTABLE HEADACHE, UNSPECIFIED CHRONICITY PATTERN, UNSPECIFIED HEADACHE TYPE: ICD-10-CM

## 2022-07-30 DIAGNOSIS — R50.9 FEVER AND CHILLS: ICD-10-CM

## 2022-07-30 DIAGNOSIS — D72.810 LYMPHOCYTOPENIA: ICD-10-CM

## 2022-07-30 DIAGNOSIS — E87.1 HYPONATREMIA: ICD-10-CM

## 2022-07-30 LAB
ALBUMIN SERPL BCG-MCNC: 3.3 G/DL (ref 3.5–5.2)
ALBUMIN UR-MCNC: 70 MG/DL
ALP SERPL-CCNC: 229 U/L (ref 35–104)
ALT SERPL W P-5'-P-CCNC: 227 U/L (ref 10–35)
ANION GAP SERPL CALCULATED.3IONS-SCNC: 11 MMOL/L (ref 7–15)
APPEARANCE UR: CLEAR
AST SERPL W P-5'-P-CCNC: 312 U/L (ref 10–35)
BASOPHILS # BLD MANUAL: 0 10E3/UL (ref 0–0.2)
BASOPHILS NFR BLD MANUAL: 0 %
BILIRUB SERPL-MCNC: 0.8 MG/DL
BILIRUB UR QL STRIP: NEGATIVE
BUN SERPL-MCNC: 13 MG/DL (ref 8–23)
BURR CELLS BLD QL SMEAR: SLIGHT
CALCIUM SERPL-MCNC: 8.7 MG/DL (ref 8.8–10.2)
CHLORIDE SERPL-SCNC: 92 MMOL/L (ref 98–107)
COLOR UR AUTO: YELLOW
CREAT SERPL-MCNC: 0.72 MG/DL (ref 0.51–0.95)
CRP SERPL-MCNC: 269 MG/L
DEPRECATED HCO3 PLAS-SCNC: 25 MMOL/L (ref 22–29)
EOSINOPHIL # BLD MANUAL: 0 10E3/UL (ref 0–0.7)
EOSINOPHIL NFR BLD MANUAL: 0 %
ERYTHROCYTE [DISTWIDTH] IN BLOOD BY AUTOMATED COUNT: 14.6 % (ref 10–15)
FLUAV RNA SPEC QL NAA+PROBE: NEGATIVE
FLUBV RNA RESP QL NAA+PROBE: NEGATIVE
GFR SERPL CREATININE-BSD FRML MDRD: 84 ML/MIN/1.73M2
GLUCOSE SERPL-MCNC: 99 MG/DL (ref 70–99)
GLUCOSE UR STRIP-MCNC: NEGATIVE MG/DL
HCT VFR BLD AUTO: 39.6 % (ref 35–47)
HGB BLD-MCNC: 13 G/DL (ref 11.7–15.7)
HGB UR QL STRIP: ABNORMAL
INR PPP: 0.82 (ref 0.85–1.15)
KETONES UR STRIP-MCNC: 100 MG/DL
LEUKOCYTE ESTERASE UR QL STRIP: NEGATIVE
LIPASE SERPL-CCNC: 12 U/L (ref 13–60)
LYMPHOCYTES # BLD MANUAL: 0.6 10E3/UL (ref 0.8–5.3)
LYMPHOCYTES NFR BLD MANUAL: 21 %
MCH RBC QN AUTO: 31 PG (ref 26.5–33)
MCHC RBC AUTO-ENTMCNC: 32.8 G/DL (ref 31.5–36.5)
MCV RBC AUTO: 94 FL (ref 78–100)
MONOCYTES # BLD MANUAL: 0.4 10E3/UL (ref 0–1.3)
MONOCYTES NFR BLD MANUAL: 12 %
MUCOUS THREADS #/AREA URNS LPF: PRESENT /LPF
NEUTROPHILS # BLD MANUAL: 2 10E3/UL (ref 1.6–8.3)
NEUTROPHILS NFR BLD MANUAL: 67 %
NITRATE UR QL: NEGATIVE
PH UR STRIP: 6 [PH] (ref 5–7)
PLAT MORPH BLD: ABNORMAL
PLATELET # BLD AUTO: 29 10E3/UL (ref 150–450)
POTASSIUM SERPL-SCNC: 4.9 MMOL/L (ref 3.4–5.3)
PROT SERPL-MCNC: 6.2 G/DL (ref 6.4–8.3)
RBC # BLD AUTO: 4.2 10E6/UL (ref 3.8–5.2)
RBC MORPH BLD: ABNORMAL
RBC URINE: 1 /HPF
RSV RNA SPEC NAA+PROBE: NEGATIVE
SARS-COV-2 RNA RESP QL NAA+PROBE: NEGATIVE
SODIUM SERPL-SCNC: 128 MMOL/L (ref 136–145)
SP GR UR STRIP: 1.02 (ref 1–1.03)
SQUAMOUS EPITHELIAL: <1 /HPF
TROPONIN T SERPL HS-MCNC: 14 NG/L
UROBILINOGEN UR STRIP-MCNC: NORMAL MG/DL
WBC # BLD AUTO: 3 10E3/UL (ref 4–11)
WBC URINE: 1 /HPF

## 2022-07-30 PROCEDURE — 87798 DETECT AGENT NOS DNA AMP: CPT | Performed by: INTERNAL MEDICINE

## 2022-07-30 PROCEDURE — 87340 HEPATITIS B SURFACE AG IA: CPT | Performed by: INTERNAL MEDICINE

## 2022-07-30 PROCEDURE — 84484 ASSAY OF TROPONIN QUANT: CPT | Performed by: INTERNAL MEDICINE

## 2022-07-30 PROCEDURE — 71045 X-RAY EXAM CHEST 1 VIEW: CPT

## 2022-07-30 PROCEDURE — 71045 X-RAY EXAM CHEST 1 VIEW: CPT | Mod: 26 | Performed by: RADIOLOGY

## 2022-07-30 PROCEDURE — 80053 COMPREHEN METABOLIC PANEL: CPT | Performed by: INTERNAL MEDICINE

## 2022-07-30 PROCEDURE — 86803 HEPATITIS C AB TEST: CPT | Performed by: INTERNAL MEDICINE

## 2022-07-30 PROCEDURE — 84300 ASSAY OF URINE SODIUM: CPT

## 2022-07-30 PROCEDURE — 86140 C-REACTIVE PROTEIN: CPT | Performed by: INTERNAL MEDICINE

## 2022-07-30 PROCEDURE — 36415 COLL VENOUS BLD VENIPUNCTURE: CPT | Performed by: INTERNAL MEDICINE

## 2022-07-30 PROCEDURE — 99285 EMERGENCY DEPT VISIT HI MDM: CPT | Performed by: INTERNAL MEDICINE

## 2022-07-30 PROCEDURE — 999N000128 HC STATISTIC PERIPHERAL IV START W/O US GUIDANCE

## 2022-07-30 PROCEDURE — 99285 EMERGENCY DEPT VISIT HI MDM: CPT | Mod: 25 | Performed by: INTERNAL MEDICINE

## 2022-07-30 PROCEDURE — 86431 RHEUMATOID FACTOR QUANT: CPT | Performed by: INTERNAL MEDICINE

## 2022-07-30 PROCEDURE — 87798 DETECT AGENT NOS DNA AMP: CPT | Mod: 59 | Performed by: STUDENT IN AN ORGANIZED HEALTH CARE EDUCATION/TRAINING PROGRAM

## 2022-07-30 PROCEDURE — 85027 COMPLETE CBC AUTOMATED: CPT | Performed by: INTERNAL MEDICINE

## 2022-07-30 PROCEDURE — 87637 SARSCOV2&INF A&B&RSV AMP PRB: CPT | Performed by: INTERNAL MEDICINE

## 2022-07-30 PROCEDURE — 86618 LYME DISEASE ANTIBODY: CPT | Performed by: INTERNAL MEDICINE

## 2022-07-30 PROCEDURE — 85007 BL SMEAR W/DIFF WBC COUNT: CPT | Performed by: INTERNAL MEDICINE

## 2022-07-30 PROCEDURE — 96361 HYDRATE IV INFUSION ADD-ON: CPT | Performed by: INTERNAL MEDICINE

## 2022-07-30 PROCEDURE — 81001 URINALYSIS AUTO W/SCOPE: CPT | Performed by: INTERNAL MEDICINE

## 2022-07-30 PROCEDURE — 86708 HEPATITIS A ANTIBODY: CPT | Performed by: INTERNAL MEDICINE

## 2022-07-30 PROCEDURE — 250N000013 HC RX MED GY IP 250 OP 250 PS 637: Performed by: INTERNAL MEDICINE

## 2022-07-30 PROCEDURE — 96360 HYDRATION IV INFUSION INIT: CPT | Mod: 59 | Performed by: INTERNAL MEDICINE

## 2022-07-30 PROCEDURE — 76705 ECHO EXAM OF ABDOMEN: CPT | Mod: 26 | Performed by: STUDENT IN AN ORGANIZED HEALTH CARE EDUCATION/TRAINING PROGRAM

## 2022-07-30 PROCEDURE — 250N000011 HC RX IP 250 OP 636: Performed by: INTERNAL MEDICINE

## 2022-07-30 PROCEDURE — 258N000003 HC RX IP 258 OP 636: Performed by: INTERNAL MEDICINE

## 2022-07-30 PROCEDURE — 85610 PROTHROMBIN TIME: CPT | Performed by: INTERNAL MEDICINE

## 2022-07-30 PROCEDURE — 86706 HEP B SURFACE ANTIBODY: CPT | Performed by: INTERNAL MEDICINE

## 2022-07-30 PROCEDURE — 83935 ASSAY OF URINE OSMOLALITY: CPT

## 2022-07-30 PROCEDURE — C9803 HOPD COVID-19 SPEC COLLECT: HCPCS | Performed by: INTERNAL MEDICINE

## 2022-07-30 PROCEDURE — 82550 ASSAY OF CK (CPK): CPT | Performed by: INTERNAL MEDICINE

## 2022-07-30 PROCEDURE — 87040 BLOOD CULTURE FOR BACTERIA: CPT | Mod: XS | Performed by: INTERNAL MEDICINE

## 2022-07-30 PROCEDURE — 86036 ANCA SCREEN EACH ANTIBODY: CPT | Performed by: INTERNAL MEDICINE

## 2022-07-30 PROCEDURE — 83690 ASSAY OF LIPASE: CPT | Performed by: INTERNAL MEDICINE

## 2022-07-30 PROCEDURE — 76705 ECHO EXAM OF ABDOMEN: CPT

## 2022-07-30 RX ORDER — ONDANSETRON 4 MG/1
4 TABLET, ORALLY DISINTEGRATING ORAL ONCE
Status: COMPLETED | OUTPATIENT
Start: 2022-07-30 | End: 2022-07-30

## 2022-07-30 RX ORDER — ACETAMINOPHEN 325 MG/1
650 TABLET ORAL EVERY 4 HOURS PRN
Status: DISCONTINUED | OUTPATIENT
Start: 2022-07-30 | End: 2022-08-02 | Stop reason: HOSPADM

## 2022-07-30 RX ORDER — SODIUM CHLORIDE 9 MG/ML
INJECTION, SOLUTION INTRAVENOUS CONTINUOUS
Status: DISCONTINUED | OUTPATIENT
Start: 2022-07-30 | End: 2022-08-02

## 2022-07-30 RX ADMIN — ONDANSETRON 4 MG: 4 TABLET, ORALLY DISINTEGRATING ORAL at 19:10

## 2022-07-30 RX ADMIN — SODIUM CHLORIDE: 9 INJECTION, SOLUTION INTRAVENOUS at 23:44

## 2022-07-30 RX ADMIN — SODIUM CHLORIDE 1000 ML: 9 INJECTION, SOLUTION INTRAVENOUS at 21:59

## 2022-07-30 RX ADMIN — ACETAMINOPHEN 650 MG: 325 TABLET, FILM COATED ORAL at 20:05

## 2022-07-30 ASSESSMENT — ENCOUNTER SYMPTOMS
PALPITATIONS: 0
SORE THROAT: 0
WHEEZING: 0
SPEECH DIFFICULTY: 0
NECK PAIN: 0
DYSURIA: 0
ADENOPATHY: 0
FATIGUE: 1
LIGHT-HEADEDNESS: 0
NUMBNESS: 0
COUGH: 0
BACK PAIN: 0
HEADACHES: 1
DIARRHEA: 0
WEAKNESS: 1
ABDOMINAL PAIN: 0
VOMITING: 0
FEVER: 1
SHORTNESS OF BREATH: 0
CONFUSION: 0
NAUSEA: 1
CHILLS: 1

## 2022-07-30 NOTE — ED TRIAGE NOTES
Triage Assessment     Row Name 07/30/22 6060       Triage Assessment (Adult)    Airway WDL WDL       Respiratory WDL    Respiratory WDL WDL       Skin Circulation/Temperature WDL    Skin Circulation/Temperature WDL WDL       Cardiac WDL    Cardiac WDL WDL       Peripheral/Neurovascular WDL    Peripheral Neurovascular WDL WDL       Cognitive/Neuro/Behavioral WDL    Cognitive/Neuro/Behavioral WDL WDL

## 2022-07-30 NOTE — ED TRIAGE NOTES
Triage Assessment     Row Name 07/30/22 9326       Triage Assessment (Adult)    Airway WDL WDL       Respiratory WDL    Respiratory WDL WDL       Skin Circulation/Temperature WDL    Skin Circulation/Temperature WDL WDL       Cardiac WDL    Cardiac WDL WDL       Peripheral/Neurovascular WDL    Peripheral Neurovascular WDL WDL       Cognitive/Neuro/Behavioral WDL    Cognitive/Neuro/Behavioral WDL WDL               Alfredo Hoff)  Pediatrics  [address] [place] [address], [place] [address]  Phone: [phone]  Fax: [phone]  Established Patient  Follow Up Time: 1-3 days

## 2022-07-31 ENCOUNTER — APPOINTMENT (OUTPATIENT)
Dept: CT IMAGING | Facility: CLINIC | Age: 82
End: 2022-07-31
Attending: INTERNAL MEDICINE
Payer: COMMERCIAL

## 2022-07-31 PROBLEM — E87.1 HYPONATREMIA: Status: ACTIVE | Noted: 2022-07-31

## 2022-07-31 PROBLEM — R50.9 FEVER AND CHILLS: Status: ACTIVE | Noted: 2022-07-31

## 2022-07-31 PROBLEM — D72.810 LYMPHOPENIA: Status: ACTIVE | Noted: 2022-07-31

## 2022-07-31 PROBLEM — R74.8 ELEVATED LIVER ENZYMES: Status: ACTIVE | Noted: 2022-07-31

## 2022-07-31 LAB
ALBUMIN SERPL BCG-MCNC: 2.8 G/DL (ref 3.5–5.2)
ALP SERPL-CCNC: 188 U/L (ref 35–104)
ALT SERPL W P-5'-P-CCNC: 168 U/L (ref 10–35)
ANION GAP SERPL CALCULATED.3IONS-SCNC: 8 MMOL/L (ref 7–15)
APAP SERPL-MCNC: 17.9 UG/ML (ref 10–30)
AST SERPL W P-5'-P-CCNC: 196 U/L (ref 10–35)
B BURGDOR IGG+IGM SER QL: 0.06
BASOPHILS # BLD MANUAL: 0 10E3/UL (ref 0–0.2)
BASOPHILS NFR BLD MANUAL: 0 %
BILIRUB SERPL-MCNC: 0.6 MG/DL
BUN SERPL-MCNC: 11 MG/DL (ref 8–23)
BURR CELLS BLD QL SMEAR: SLIGHT
CALCIUM SERPL-MCNC: 7.8 MG/DL (ref 8.8–10.2)
CHLORIDE SERPL-SCNC: 100 MMOL/L (ref 98–107)
CK SERPL-CCNC: 61 U/L (ref 26–192)
CREAT SERPL-MCNC: 0.69 MG/DL (ref 0.51–0.95)
DEPRECATED HCO3 PLAS-SCNC: 24 MMOL/L (ref 22–29)
EOSINOPHIL # BLD MANUAL: 0 10E3/UL (ref 0–0.7)
EOSINOPHIL NFR BLD MANUAL: 0 %
ERYTHROCYTE [DISTWIDTH] IN BLOOD BY AUTOMATED COUNT: 15.1 % (ref 10–15)
ERYTHROCYTE [SEDIMENTATION RATE] IN BLOOD BY WESTERGREN METHOD: 12 MM/HR (ref 0–30)
GFR SERPL CREATININE-BSD FRML MDRD: 87 ML/MIN/1.73M2
GLUCOSE SERPL-MCNC: 85 MG/DL (ref 70–99)
HCT VFR BLD AUTO: 39.1 % (ref 35–47)
HGB BLD-MCNC: 12.7 G/DL (ref 11.7–15.7)
HOLD SPECIMEN: NORMAL
LYMPHOCYTES # BLD MANUAL: 0.7 10E3/UL (ref 0.8–5.3)
LYMPHOCYTES NFR BLD MANUAL: 25 %
MCH RBC QN AUTO: 31.1 PG (ref 26.5–33)
MCHC RBC AUTO-ENTMCNC: 32.5 G/DL (ref 31.5–36.5)
MCV RBC AUTO: 96 FL (ref 78–100)
MONOCYTES # BLD MANUAL: 0.1 10E3/UL (ref 0–1.3)
MONOCYTES NFR BLD MANUAL: 3 %
NEUTROPHILS # BLD MANUAL: 2 10E3/UL (ref 1.6–8.3)
NEUTROPHILS NFR BLD MANUAL: 72 %
OSMOLALITY UR: 701 MMOL/KG (ref 100–1200)
PLAT MORPH BLD: ABNORMAL
PLATELET # BLD AUTO: 34 10E3/UL (ref 150–450)
POTASSIUM SERPL-SCNC: 4 MMOL/L (ref 3.4–5.3)
PROT SERPL-MCNC: 5.4 G/DL (ref 6.4–8.3)
RBC # BLD AUTO: 4.08 10E6/UL (ref 3.8–5.2)
RBC MORPH BLD: ABNORMAL
RETICS # AUTO: 0.02 10E6/UL (ref 0.03–0.1)
RETICS/RBC NFR AUTO: 0.6 % (ref 0.5–2)
SODIUM SERPL-SCNC: 132 MMOL/L (ref 136–145)
SODIUM UR-SCNC: 81 MMOL/L
WBC # BLD AUTO: 2.8 10E3/UL (ref 4–11)

## 2022-07-31 PROCEDURE — G0378 HOSPITAL OBSERVATION PER HR: HCPCS

## 2022-07-31 PROCEDURE — 80143 DRUG ASSAY ACETAMINOPHEN: CPT | Performed by: STUDENT IN AN ORGANIZED HEALTH CARE EDUCATION/TRAINING PROGRAM

## 2022-07-31 PROCEDURE — 86788 WEST NILE VIRUS AB IGM: CPT | Performed by: INTERNAL MEDICINE

## 2022-07-31 PROCEDURE — 250N000011 HC RX IP 250 OP 636: Performed by: INTERNAL MEDICINE

## 2022-07-31 PROCEDURE — 250N000013 HC RX MED GY IP 250 OP 250 PS 637: Performed by: INTERNAL MEDICINE

## 2022-07-31 PROCEDURE — 74177 CT ABD & PELVIS W/CONTRAST: CPT

## 2022-07-31 PROCEDURE — 85027 COMPLETE CBC AUTOMATED: CPT | Performed by: STUDENT IN AN ORGANIZED HEALTH CARE EDUCATION/TRAINING PROGRAM

## 2022-07-31 PROCEDURE — 99204 OFFICE O/P NEW MOD 45 MIN: CPT | Performed by: INTERNAL MEDICINE

## 2022-07-31 PROCEDURE — 74177 CT ABD & PELVIS W/CONTRAST: CPT | Mod: 26 | Performed by: RADIOLOGY

## 2022-07-31 PROCEDURE — 250N000013 HC RX MED GY IP 250 OP 250 PS 637

## 2022-07-31 PROCEDURE — 85007 BL SMEAR W/DIFF WBC COUNT: CPT | Performed by: STUDENT IN AN ORGANIZED HEALTH CARE EDUCATION/TRAINING PROGRAM

## 2022-07-31 PROCEDURE — 96361 HYDRATE IV INFUSION ADD-ON: CPT | Performed by: INTERNAL MEDICINE

## 2022-07-31 PROCEDURE — 80053 COMPREHEN METABOLIC PANEL: CPT

## 2022-07-31 PROCEDURE — 99218 PR INITIAL OBSERVATION CARE,LEVEL I: CPT | Mod: GC | Performed by: INTERNAL MEDICINE

## 2022-07-31 PROCEDURE — 85652 RBC SED RATE AUTOMATED: CPT | Performed by: STUDENT IN AN ORGANIZED HEALTH CARE EDUCATION/TRAINING PROGRAM

## 2022-07-31 PROCEDURE — 87015 SPECIMEN INFECT AGNT CONCNTJ: CPT | Performed by: INTERNAL MEDICINE

## 2022-07-31 PROCEDURE — 36415 COLL VENOUS BLD VENIPUNCTURE: CPT | Mod: 59 | Performed by: STUDENT IN AN ORGANIZED HEALTH CARE EDUCATION/TRAINING PROGRAM

## 2022-07-31 PROCEDURE — 36415 COLL VENOUS BLD VENIPUNCTURE: CPT | Performed by: STUDENT IN AN ORGANIZED HEALTH CARE EDUCATION/TRAINING PROGRAM

## 2022-07-31 PROCEDURE — 258N000003 HC RX IP 258 OP 636: Performed by: INTERNAL MEDICINE

## 2022-07-31 PROCEDURE — 85045 AUTOMATED RETICULOCYTE COUNT: CPT | Performed by: STUDENT IN AN ORGANIZED HEALTH CARE EDUCATION/TRAINING PROGRAM

## 2022-07-31 PROCEDURE — 999N000248 HC STATISTIC IV INSERT WITH US BY RN

## 2022-07-31 PROCEDURE — 36415 COLL VENOUS BLD VENIPUNCTURE: CPT | Mod: 59 | Performed by: INTERNAL MEDICINE

## 2022-07-31 RX ORDER — LEVOTHYROXINE SODIUM 88 UG/1
88 TABLET ORAL
Status: DISCONTINUED | OUTPATIENT
Start: 2022-07-31 | End: 2022-08-02 | Stop reason: HOSPADM

## 2022-07-31 RX ORDER — ONDANSETRON 4 MG/1
4 TABLET, ORALLY DISINTEGRATING ORAL EVERY 6 HOURS PRN
Status: DISCONTINUED | OUTPATIENT
Start: 2022-07-31 | End: 2022-08-02 | Stop reason: HOSPADM

## 2022-07-31 RX ORDER — PREDNISOLONE ACETATE 10 MG/ML
1 SUSPENSION/ DROPS OPHTHALMIC 2 TIMES DAILY
Status: DISCONTINUED | OUTPATIENT
Start: 2022-07-31 | End: 2022-08-02 | Stop reason: HOSPADM

## 2022-07-31 RX ORDER — LATANOPROST 50 UG/ML
1 SOLUTION/ DROPS OPHTHALMIC AT BEDTIME
Status: DISCONTINUED | OUTPATIENT
Start: 2022-07-31 | End: 2022-07-31

## 2022-07-31 RX ORDER — ONDANSETRON 2 MG/ML
4 INJECTION INTRAMUSCULAR; INTRAVENOUS EVERY 6 HOURS PRN
Status: DISCONTINUED | OUTPATIENT
Start: 2022-07-31 | End: 2022-08-02 | Stop reason: HOSPADM

## 2022-07-31 RX ORDER — CITALOPRAM HYDROBROMIDE 20 MG/1
20 TABLET ORAL DAILY
Status: DISCONTINUED | OUTPATIENT
Start: 2022-07-31 | End: 2022-08-02 | Stop reason: HOSPADM

## 2022-07-31 RX ORDER — IOPAMIDOL 755 MG/ML
73 INJECTION, SOLUTION INTRAVASCULAR ONCE
Status: COMPLETED | OUTPATIENT
Start: 2022-07-31 | End: 2022-07-31

## 2022-07-31 RX ORDER — DOXYCYCLINE 100 MG/1
100 CAPSULE ORAL EVERY 12 HOURS SCHEDULED
Status: DISCONTINUED | OUTPATIENT
Start: 2022-07-31 | End: 2022-08-02 | Stop reason: HOSPADM

## 2022-07-31 RX ORDER — LATANOPROST 50 UG/ML
1 SOLUTION/ DROPS OPHTHALMIC AT BEDTIME
Status: DISCONTINUED | OUTPATIENT
Start: 2022-07-31 | End: 2022-08-02 | Stop reason: HOSPADM

## 2022-07-31 RX ORDER — IBUPROFEN 600 MG/1
600 TABLET, FILM COATED ORAL EVERY 6 HOURS PRN
Status: DISCONTINUED | OUTPATIENT
Start: 2022-07-31 | End: 2022-07-31

## 2022-07-31 RX ORDER — PREDNISOLONE ACETATE 10 MG/ML
1 SUSPENSION/ DROPS OPHTHALMIC 2 TIMES DAILY
Status: DISCONTINUED | OUTPATIENT
Start: 2022-07-31 | End: 2022-07-31

## 2022-07-31 RX ADMIN — LEVOTHYROXINE SODIUM 88 MCG: 88 TABLET ORAL at 07:53

## 2022-07-31 RX ADMIN — ACETAMINOPHEN 650 MG: 325 TABLET, FILM COATED ORAL at 23:47

## 2022-07-31 RX ADMIN — SODIUM CHLORIDE: 9 INJECTION, SOLUTION INTRAVENOUS at 17:26

## 2022-07-31 RX ADMIN — CITALOPRAM HYDROBROMIDE 20 MG: 20 TABLET ORAL at 07:54

## 2022-07-31 RX ADMIN — ONDANSETRON 4 MG: 4 TABLET, ORALLY DISINTEGRATING ORAL at 01:30

## 2022-07-31 RX ADMIN — IOPAMIDOL 73 ML: 755 INJECTION, SOLUTION INTRAVENOUS at 00:32

## 2022-07-31 RX ADMIN — ONDANSETRON 4 MG: 4 TABLET, ORALLY DISINTEGRATING ORAL at 08:00

## 2022-07-31 RX ADMIN — ACETAMINOPHEN 650 MG: 325 TABLET, FILM COATED ORAL at 01:21

## 2022-07-31 RX ADMIN — DOXYCYCLINE HYCLATE 100 MG: 100 CAPSULE ORAL at 17:26

## 2022-07-31 RX ADMIN — SODIUM CHLORIDE: 9 INJECTION, SOLUTION INTRAVENOUS at 11:51

## 2022-07-31 NOTE — PROGRESS NOTES
Care Management Follow Up    Length of Stay (days): 0    Expected Discharge Date:  TBD     Concerns to be Addressed:     BATES Document  Patient plan of care discussed at interdisciplinary rounds: No    Anticipated Discharge Disposition:  TBD     Anticipated Discharge Services:    Anticipated Discharge DME:      Patient/family educated on Medicare website which has current facility and service quality ratings:  N/A  Education Provided on the Discharge Plan:  N/A  Patient/Family in Agreement with the Plan:  N/A    Referrals Placed by CM/SW:  Not at this time  Private pay costs discussed: insurance costs out of pocket expenses, co-pays and deductibles    Additional Information:    0989 Due to Magali's Observation status, SW met with her at bedside to introduce self, explain SW role, review the Medicare Outpatient Observation Notice (MOON) and why pt was receiving it. SW explained the document, and addressed questions and concerns. AYUSH then asked Magali to sign document. Pt signed BATES at 0948.      0955 SW faxed BATES to [facility] ([phone]) then placed BATES in Magali's chart.    SW will continue to follow as needed.    BRITTANY Edwards, LGSW  ED/OBS   [facility]  Phone: [phone]  Pager: [phone]  Fax: [phone]     On-call pager, [phone], 4:00 pm to midnight

## 2022-07-31 NOTE — CONSULTS
GENERAL INFECTIOUS DISEASES CONSULTATION - YELLOW TEAM     Patient:  Magali Macdonald   Date of birth 1940, Medical record number [id number]  Date of Visit:  07/31/2022  Date of Admission: 7/30/2022  Consult Requested by:Yang Lindquist MD  Reason for Consult:  pt w/ acute fevers, myalgias, elevated LFTs and Alk phos, lymphopenia, and thrombocytopenia, lyme negative          Assessment and Recommendations:     Magali Macdonald is an 82 year old female with PMH of hypothyroidism, osteoporosis and anxiety presented to ED on 7/30/22 with 6 days feeling ill with fevers, headaches ( but no visual changes/ photosensitivity) , myalgias, athralgia, nausea, no vomiting, no appetite and fatigue with nightsweats 6 days ago. Symptoms started on the day she came back after spending  11 days in [place], [place]    1. Febrile illness - Possible tick borne illness  - lyme test - neg   - SARsCoV2, Influenza A/B/RSV - neg   - erlichia/anaplasma - PCR pending   - blood cx - neg     2. Headaches without signs of meningitis/ encephalitis   3. Myalgia/athralgia   4. Nausea   5. Elevated transaminases  6. Anorexia   7. Night sweats ( on the first day of illness)   8. Lethargy   9. Leukopenia 2.8, lymphopenia and Thrombocytopenia  29  10. Hyponatremia    RECOMMENDATION:  - parasite blood smear to check for Anaplasma/ Babesia ( ordered for you)  - start empiric Doxycycline 100 mg po q12 hr  (PO/IV) to cover for Anaplasma. Lyme - negative.  She wants to try PO ( ordered for you)   - also check for West Nile virus IgM  - follow-up LFTs and CBC diff   - would avoid NSAIDs with thrombocytopenia/ monitor platelets closely     ID  will continue to follow. Dr Duarte ( ID  Yellow Team) will assume care tomorrow      Plan discussed with primary team     Thank you for allowing us to participate in the care of this patient    Michael Rene MD, M.Med.Sc  Staff, Infectious Diseases   Pager : [phone]         History of Present Illness:  "  Magali Macdonald is an 82 year old female with PMH of hypothyroidism, osteoporosis and anxiety presented to ED on 7/30/22 with 6 days feeling ill with fevers, headaches ( but no visual changes/ photosensitivity) , myalgias, athralgia, nausea, no vomiting, no appetite and fatigue with nightsweats 6 days ago.     She spent 11 days in her cabin in [place], [place] . she felt some \"bite\" on her right ankle . it was itchy. did not see any ticks on her. no close contacts are sick.no dogs at home. she saw her grand daughter who had COVID19 but out of isolation period recently.    She has been taking tylenol up to 4000 mg /day for fever and aches at home  . no rhinorrhea/sinus pain/ chest pain/shortness of breath/ cough/ diarrhea/ abdominal pain/ spinal pain/ urinary symptoms/ bleeding.     Imaging:  CXR 7/30/22  IMPRESSION: Heart size is normal. No evidence of pneumonia or pulmonary edema. No visible pneumothorax or pleural effusion.    Limited Abd US 7/30/22  IMPRESSION: Common bile duct measures 8 mm, at upper limits of normal for age. Pancreas is partially obscured, otherwise ultrasound right upper quadrant is within normal limits within normal limits     CT abdomen/pelvis w contrast 7/31/22  IMPRESSION:   1.  Mild hepatic steatosis without biliary dilatation.  2.  L3 compression fracture.  3.  Trace free pelvic fluid.  4.  Diverticulosis without diverticulitis.         Review of Systems:   CONSTITUTIONAL:  see HPI   EYES: negative for icterus  ENT:  negative for hearing loss, tinnitus and sore throat  RESPIRATORY:  negative for cough with sputum and dyspnea  CARDIOVASCULAR:  negative for chest pain, dyspnea  GASTROINTESTINAL:  see HPI   GENITOURINARY:  negative for dysuria  HEME:  No easy bruising  INTEGUMENT:  negative for rash and pruritus  NEURO: see HPI          Past Medical History:     Past Medical History:   Diagnosis Date     Anisometropia      Aphakia of left eye      Arthritis      Cystoid macular edema of left eye  "     Epiretinal membrane, both eyes      Hypothyroid      Nonsenile cataract      Osteoporosis      Retinal detachment           Past Surgical History:     Past Surgical History:   Procedure Laterality Date     APPENDECTOMY       CATARACT IOL, RT/LT       EYE SURGERY      Left eye lens implant/anterior vitrectomy     EYE SURGERY Left 05/09/2018    DSAEK     EYE SURGERY Left 06/25/2019    repeat DSAEK     RETINAL REATTACHMENT       TUBAL LIGATION       VITRECTOMY PARSPLANA  6/28/13    left eye at [facility]     VITRECTOMY PARSPLANA, SCLERAL BUCKLE/RETINAL REATTACHMENT WITH 23 GAGUE SYSTEM  10/3/13    left eye     ZZC ANESTH,CORNEAL TRANSPLANT              Family History:     Family History   Problem Relation Age of Onset     Glaucoma No family hx of      Macular Degeneration No family hx of      Diabetes No family hx of      Hypertension No family hx of           Social History:     Social History     Tobacco Use     Smoking status: Never Smoker     Smokeless tobacco: Never Used   Substance Use Topics     Alcohol use: Yes     History   Sexual Activity     Sexual activity: Not on file          Current Medications (antimicrobials listed in bold):       citalopram  20 mg Oral Daily     latanoprost  1 drop Left Eye At Bedtime     levothyroxine  88 mcg Oral QAM AC     prednisoLONE acetate  1 drop Left Eye BID     [START ON 8/1/2022] traZODone  25 mg Oral At Bedtime          Allergies:     Allergies   Allergen Reactions     Tropicamide Itching     Eyes get really red  Red and irritated eyes!     Penicillins Unknown     Thimerosal Unknown     Per pt, allergic (doesn't know symptoms)          Physical Exam:   Vitals were reviewed  Patient Vitals for the past 24 hrs:   BP Temp Temp src Pulse Resp SpO2 Height Weight   07/31/22 1200 (!) 158/80 -- -- -- -- 93 % -- --   07/31/22 1017 -- 100.1  F (37.8  C) Oral -- -- -- -- --   07/31/22 0755 135/67 99.2  F (37.3  C) Oral 83 17 93 % -- --   07/31/22 0300 -- -- -- -- -- 95 % --  "--   07/31/22 0230 (!) 140/80 -- -- -- 16 92 % -- --   07/31/22 0115 137/75 98.2  F (36.8  C) Oral -- 16 95 % -- --   07/30/22 2345 124/77 -- -- -- -- 93 % -- --   07/30/22 2339 -- 99.2  F (37.3  C) Oral -- 18 93 % -- --   07/30/22 2215 120/70 -- -- -- 18 95 % -- --   07/30/22 2201 -- 99  F (37.2  C) Oral -- 18 93 % -- --   07/30/22 2200 125/67 -- -- 83 -- 94 % -- --   07/30/22 2115 124/72 -- -- 96 -- -- -- --   07/30/22 2004 (!) 155/74 (!) 100.5  F (38.1  C) Oral -- 18 95 % -- --   07/30/22 1930 -- -- -- -- -- 96 % -- --   07/30/22 1835 (!) 152/74 (!) 101.2  F (38.4  C) Oral 86 18 96 % -- --   07/30/22 1714 (!) 140/65 99.4  F (37.4  C) Oral 89 16 97 % 1.549 m (5' 1\") 54.4 kg (120 lb)     Ranges for his vital signs:  Temp:  [98.2  F (36.8  C)-101.2  F (38.4  C)] 100.1  F (37.8  C)  Pulse:  [83-96] 83  Resp:  [16-18] 17  BP: (120-158)/(65-80) 158/80  SpO2:  [92 %-97 %] 93 %  Physical Examination:  GENERAL:  alert, oriented, lethargic , in bed in no acute distress.   HEENT:  Head is normocephalic, atraumatic   EYES:  Eyes have anicteric sclerae without conjunctival injection or stigmata of endocarditis.    ENT:  Oropharynx is dry without exudates or ulcers. Tongue is midline  NECK:  Supple. palpablee but No sig enlarged  Cervical lymphadenopathy  LUNGS:  Clear to auscultation bilateral.   CARDIOVASCULAR:  Regular rate and rhythm with no murmurs, gallops or rubs.  ABDOMEN:  Normal bowel sounds, soft, nontender. No appreciable hepatosplenomegaly, no CV tenderness   Spine: not tender on percussion   SKIN:  No acute rashes.    Extremities : no edema , no joint swelling   PIV Line(s) are in place without any surrounding erythema or exudate. No stigmata of endocarditis.  NEUROLOGIC:  Grossly nonfocal. Active x4 extremities         Laboratory Data:     Inflammatory Markers    Recent Labs   Lab Test 07/31/22  0727 07/30/22  1937   SED 12  --    CRP  --  269.00*     Hematology Studies    Recent Labs   Lab Test 07/31/22  1236 " 07/30/22 1937   WBC 2.8* 3.0*   ANEU  --  2.0   AEOS  --  0.0   HGB 12.7 13.0   MCV 96 94   PLT 34* 29*     Metabolic Studies     Recent Labs   Lab Test 07/31/22 0727 07/30/22 1937   * 128*   POTASSIUM 4.0 4.9   CHLORIDE 100 92*   CO2 24 25   BUN 11.0 13.0   CR 0.69 0.72   GFRESTIMATED 87 84     Hepatic Studies    Recent Labs   Lab Test 07/31/22 0727 07/30/22 1937   BILITOTAL 0.6 0.8   ALKPHOS 188* 229*   ALBUMIN 2.8* 3.3*   * 312*   * 227*     Urine Studies    Recent Labs   Lab Test 07/30/22 2006   LEUKEST Negative   WBCU 1

## 2022-07-31 NOTE — H&P
[facility]    History and Physical - Medicine Service, MAROON TEAM        Date of Admission:  7/30/2022    Assessment & Plan      Magali Macdonald is a 82 year old female with PMH of hypothyroid and anxiety presenting with one week of fever, myalgias, and fatigue found to have elevated LFTs.    # Fevers, myalgias, headache  # Elevated LFTs  # Lymphopenia  # Thrombocytopenia  Concerning for infection vs inflammatory process; ddx very broad. Constellation of fevers + elevated LFTs + thrombocytopenia + lymphopenia and history of bug bite concerning for tickborne illness. Other infectious workup so far negative for COVID, flu, RSV; UA not consistent with infection, CXR with no PNA or effusions. Pending other labs (see below). RUQ US and CT AP show only hepatic steatosis, no other visible etiology for elevated LFTs. Elevated CRP and headaches + fevers concerning for rheum etiologies including GCA, though would not explain other lab findings and headaches not consistent in character with GCA. Pt did have positive EMERALD in past (1:80 in 2004, negative in 2018) but reports no joint pain currently, only muscular. Less likely but possible is elevated LFTs from Tylenol intake for infxs symptoms.   - Infxn workup pending   - BCx, Hep A, B, + C, Babesia, Erlichia, Lyme  - Rheum workup pending   - RF, ANCA, ESR  - Tylenol level pending   - Hold PRN tylenol for headache pain until this level returns    # Hyponatremia  Likely due to 1 week of poor PO intake. Most recent BMP 01/04/2021 Na normal at 142.  - Urine Na and osms pending (unable to add on serum osms to contemporaneous blood sample)  - NS at 125 ml/hr  - PO AL    Chronic:  # Anxiety  PTA citalopram 20mg qday  PTA trazodone 25mg qhs    # Hypothyroid  PTA levothyroxine 88mcg     Diet:  Regular  DVT Prophylaxis: None- obs  Lane Catheter: Not present  Fluids: NS @ 125  Central Lines: None  Cardiac Monitoring: None  Code Status:   "Full     To be staffed in AM    Siomara Cerna MD  Medicine Service, [facility]  Securely message with the [employer] Web Console (learn more here)  Text page via [facility] Paging/Directory   Please see signed in provider for up to date coverage information    ______________________________________________________________________    Chief Complaint   Fevers, myalgias, fatigue, nausea    History of Present Illness   Magali Macdonald is an 82 year old female with PMH of hypothyroid and anxiety presenting with one week of fevers, myalgias, nausea, fatigue, and malaise.     She first became febrile on 7/25 with a fever peaking at 102 that day. She continued to have fevers the rest of the week, as well as diffuse myalgias (\"pain in the muscles but not in the joints\"), headaches, and nausea with dry heaving but no vomiting. She has also had poor appetite and poor PO intake, as well as fatigue and malaise. Denies cough, runny nose, congestion, chest pain, dyspnea, abdominal pain, dysuria, melena, hematochezia, hematemesis, or rashes.     Headache comes and goes, is bilateral frontal, not associated with sensitivity to light or sound, not associated with jaw pain or vision changes.    She has been taking Tylenol 500mg every four hours for the last week.    She went to her cabin in [facility] the week prior and returned on Fri 7/22. She had a very itchy bug bite on her ankle but saw no bug still attached. Her  who she went with has not been sick. Prior to developing these sx she saw her granddaughter who had COVID recently but who was out of the quarantine period. No other sick exposures, no new foods tried, she did not swim in [place].    Review of Systems    Negative Except as per HPI    Past Medical History    Hypothyroid  Osteoporosis  Anxiety    Past Surgical History   Past surgical history review with no previous surgeries identified.    Social History   I have " personally reviewed the social history with the patient showing    Smoking status: Never Smoker     Smokeless tobacco: Never Used   Substance Use Topics     Alcohol use: Yes   Alcohol/week: 2.5 standard drinks   Types: 3 Glasses of wine per week   Comment: Occasional glass of wine with dinner     Drug use: No       Family History   None relevant    Prior to Admission Medications   Prior to Admission Medications   Prescriptions Last Dose Informant Patient Reported? Taking?   CITALOPRAM HYDROBROMIDE PO   Yes No   Cyanocobalamin (VITAMIN B 12 PO)   Yes No   Omega-3 Fatty Acids (OMEGA-3 FISH OIL PO)   Yes No   TRAZODONE HCL PO   Yes No   Sig: Take 0.5 tablets by mouth At Bedtime   calcium carb 1250 mg, 500 mg Ute,/vitamin D 200 units (OSCAL WITH D) 500-200 MG-UNIT per tablet   Yes No   Sig: Take 1 tablet by mouth 2 times daily (with meals)   latanoprost (XALATAN) 0.005 % ophthalmic solution   No No   Sig: Place 1 drop Into the left eye At Bedtime   levothyroxine (SYNTHROID) 25 mcg/mL   Yes No   Sig: Take 75 mg by mouth daily   prednisoLONE acetate (PRED FORTE) 1 % ophthalmic suspension   No No   Sig: Place 1 drop Into the left eye 2 times daily      Facility-Administered Medications: None     Allergies   Allergies   Allergen Reactions     Tropicamide Itching     Eyes get really red  Red and irritated eyes!     Penicillins Unknown     Thimerosal Unknown     Per pt, allergic (doesn't know symptoms)       Physical Exam   Vital Signs: Temp: 98.2  F (36.8  C) Temp src: Oral BP: 137/75 Pulse: 83   Resp: 16 SpO2: 95 % O2 Device: None (Room air)    Weight: 120 lbs 0 oz    General: No distress  Pulm: breathing comfortably on room air, CTAB  CV: RRR, no murmurs  Extremities: No LE edema, no rashes, no clear bug bite  Eyes: No scleral icterus  Neuro: No asterixis, moving all extremities    Data   Data reviewed today: I reviewed all medications, new labs and imaging results over the last 24 hours.

## 2022-07-31 NOTE — PROGRESS NOTES
[facility]    Medicine Progress Note - Medicine Service, MAROON TEAM 1    Date of Admission:  7/30/2022    Assessment & Plan          Magali Macdonald is a 82 year old female with PMH of hypothyroid and anxiety presenting with one week of fever, myalgias, and fatigue found to have elevated LFTs, lymphopenia, and thrombocytopenia.     # Fevers, myalgias, headache  # Elevated LFTs, improving  # Lymphopenia, worsening  # Thrombocytopenia, improving  Concerning for infection vs inflammatory process; ddx very broad. Constellation of fevers + elevated LFTs + thrombocytopenia + lymphopenia and history of recent stay in [facility] most concerning for tickborne illness. Lymes disease antibodies non detectable. Other tick born illness testing is pending. Additional infectious workup so far negative for COVID, flu, RSV; CXR with no PNA or effusions; UA not consistent with infection; Blood cultures without growth. RUQ US and CT AP show only hepatic steatosis. Tylenol level normal. No other visible etiology for elevated LFTs and Alk phos and pt has no known hx of liver disease. Elevated CRP and headaches + fevers (w/ hx of hypothyroidism and positive EMERALD in the past) concerning for rheum etiologies, however ESR negative. Today, LFTs drastically improved after IV fluids. Thrombocytopenia mildly improved compared to yesterday, however white count did drop from 3 to 2.8.   - Infxn workup pending   -ID consulted today. Appreciate recs   -Start empiric doxycycline 100 mg PO q12h   -Parasite blood smear to check for Anaplasma/ Babesia   -West Nile virus IgM pending    -Hep A, B, + C pending    -Babesia, Erlichia PCR pending   -Blood cultures: No growth after 12 hours  - Heme workup pending    -peripheral smear  - Rheum workup pending   - RF, ANCA pending  - Tylenol PRN for pain relief (not to exceed 2 g per 24 hour period)   - Recheck CMP and CBC in AM     # Hyponatremia, improving  DDx  most likely hypovolemic hyponatremia, as sodium improved to 132 with 2 L of NS. However, urine sodium 81 which more aligns with an SIADH type picture.  - Continue NS at 125 ml/hr  - Recheck CMP in AM. If sodium not improved from today, consider SIADH workup    #. Hypoalbuminemia   #. Proteinuria  Ketones and proteins on urine. Proteinuria not nephrotic range. May be more indicative for acute renal injury range in setting of potential protein malnutrition  -Consider nutrition consult   -Consider PCP follow up      Chronic:  # Anxiety  PTA citalopram 20mg qday  PTA trazodone 25mg qhs    # Hypothyroid  PTA levothyroxine 88mcg       Diet: Regular Diet Adult    DVT Prophylaxis: Pneumatic Compression Devices  Lane Catheter: Not present  Central Lines: None  Cardiac Monitoring: None  Code Status: Full Code      Disposition Plan      Expected Discharge Date: 08/01/2022                The patient's care was discussed with the Attending Physician, Dr. Cruz and Patient.    Jayleen Snyder MD  Medicine Service, [facility] TEAM 1  [facility]  Securely message with the Vocera Web Console (learn more here)  Text page via [facility] Paging/Directory   Please see signed in provider for up to date coverage information      Clinically Significant Risk Factors Present on Admission         # Hyponatremia: Na = 132 mmol/L (Ref range: 136 - 145 mmol/L) on admission, will monitor as appropriate     # Hypoalbuminemia: Albumin = 2.8 g/dL (Ref range: 3.5 - 5.2 g/dL) on admission, will monitor as appropriate    # Thrombocytopenia: Plts = 34 10e3/uL (Ref range: 150 - 450 10e3/uL) on admission, will monitor for bleeding           ______________________________________________________________________    Interval History   Patient presents for fevers, myalgias, frontal HA, and anorexia x 5 days. Sxs began early Tuesday morning. Had recently returned from a trip to her cabin in [place]. Didn't do anything  different at the cabin--didn't go hiking in the woods, didn't go swimming in the lake, etc. Harrison fine Monday and went out to dinner with friends and had a burger. Woke up the next morning with fevers, myalgias, and anorexia. No one else she was with got sick. Her  also feels fine. Sxs feel like she did when she had influenza years ago. Hasnt had much to eat or drink over the past 5 days due to symptoms. Taking Tylenol every 5 hours for her HA.    Denies changes in vision, URI symptoms, chest pain, palpitations, shortness of breath, vomiting, diarrhea, constipation, changes in urination, or rash.     Data reviewed today: I reviewed all medications, new labs and imaging results over the last 24 hours.     Physical Exam   Vital Signs: Temp: 100.1  F (37.8  C) Temp src: Oral BP: (!) 158/80 Pulse: 83   Resp: 17 SpO2: 93 % O2 Device: None (Room air)    Weight: 120 lbs 0 oz  Constitutional: awake, alert, cooperative, no apparent distress, and appears stated age  Respiratory: No increased work of breathing, good air exchange, clear to auscultation bilaterally, no crackles or wheezing  Cardiovascular: Normal apical impulse, regular rate and rhythm, normal S1 and S2, no S3 or S4, and no murmur noted  GI: No scars, normal bowel sounds, soft, non-distended, non-tender, no masses palpated, no hepatosplenomegally  Skin: no bruising or bleeding, normal skin color, texture, turgor and no redness, warmth, or swelling, no rashes  Neuropsychiatric: General: normal, calm and normal eye contact  Level of consciousness: alert / normal    Data   Recent Results (from the past 24 hour(s))   XR Chest Port 1 View    Narrative    EXAM: XR CHEST PORT 1 VIEW  LOCATION: [facility]  DATE/TIME: 7/30/2022 10:07 PM    INDICATION: fever  COMPARISON: None.      Impression    IMPRESSION: Heart size is normal. No evidence of pneumonia or pulmonary edema. No visible pneumothorax or pleural effusion.    US Abdomen Limited (RUQ)    Narrative    EXAMINATION: Limited Abdominal Ultrasound, 7/30/2022 11:29 PM     COMPARISON: None.    History: elevated LFTs.     FINDINGS:   Fluid: No evidence of ascites or pleural effusions.    Liver: The liver demonstrates normal echotexture, measuring 13.7 cm in  craniocaudal dimension. There is no focal mass. Main portal vein is  patent with antegrade flow towards the liver.    Gallbladder: There is no wall thickening, pericholecystic fluid,  positive sonographic Persaud's sign or evidence for cholelithiasis.    Bile Ducts: Both the intra- and extrahepatic biliary system are of  normal caliber.  The common bile duct measures 8 mm in diameter,  normal given the patient's age.    Pancreas: Visualized portions of the head and body of the pancreas are  unremarkable. Pancreas is partially obscured    Kidney: The right kidney measures 9.6 cm long. There is no  hydronephrosis or hydroureter, no shadowing renal calculi, cystic  lesion or mass.       Impression    IMPRESSION: Common bile duct measures 8 mm, at upper limits of normal  for age. Pancreas is partially obscured, otherwise ultrasound right  upper quadrant is within normal limits within normal limits    I have personally reviewed the examination and initial interpretation  and I agree with the findings.    CAROL JUSTICE MD         SYSTEM ID:  [id number]   CT Abdomen Pelvis w Contrast    Narrative    EXAM: CT ABDOMEN PELVIS W CONTRAST  LOCATION: [facility]  DATE/TIME: 7/31/2022 12:26 AM    INDICATION: fever, leukopenia, elevated liver enzymes  COMPARISON: None.  TECHNIQUE: CT scan of the abdomen and pelvis was performed following injection of IV contrast. Multiplanar reformats were obtained. Dose reduction techniques were used.  CONTRAST: 73 mL Isovue-[id number]    FINDINGS:   LOWER CHEST: Small hiatal hernia    HEPATOBILIARY: Mild steatosis    PANCREAS: Unremarkable    SPLEEN:  Unremarkable    ADRENAL GLANDS: Unremarkable    KIDNEYS/BLADDER: No hydronephrosis. Normal outer contour.    BOWEL: Diverticulosis without diverticulitis.    LYMPH NODES: No significant retroperitoneal adenopathy    VASCULATURE: Scattered atherosclerotic plaque without abdominal aortic aneurysm    PELVIC ORGANS: Small volume of free pelvic fluid.    MUSCULOSKELETAL: High-grade compression fracture/vertebral plana deformity at L3, age indeterminate.      Impression    IMPRESSION:   1.  Mild hepatic steatosis without biliary dilatation.  2.  L3 compression fracture.  3.  Trace free pelvic fluid.  4.  Diverticulosis without diverticulitis.

## 2022-08-01 LAB
ALBUMIN SERPL BCG-MCNC: 2.7 G/DL (ref 3.5–5.2)
ALP SERPL-CCNC: 194 U/L (ref 35–104)
ALT SERPL W P-5'-P-CCNC: 125 U/L (ref 10–35)
ANAPLASMA BLD MOD GIEMSA: ABNORMAL
ANION GAP SERPL CALCULATED.3IONS-SCNC: 10 MMOL/L (ref 7–15)
AST SERPL W P-5'-P-CCNC: 154 U/L (ref 10–35)
BABESIA SPEC: NEGATIVE
BILIRUB SERPL-MCNC: 0.5 MG/DL
BUN SERPL-MCNC: 9.9 MG/DL (ref 8–23)
CALCIUM SERPL-MCNC: 7.6 MG/DL (ref 8.8–10.2)
CHLORIDE SERPL-SCNC: 101 MMOL/L (ref 98–107)
CREAT SERPL-MCNC: 0.7 MG/DL (ref 0.51–0.95)
CRP SERPL-MCNC: 238 MG/L
DEPRECATED HCO3 PLAS-SCNC: 22 MMOL/L (ref 22–29)
EHRLICHIA SPEC QL MICRO: NEGATIVE
ERYTHROCYTE [DISTWIDTH] IN BLOOD BY AUTOMATED COUNT: 15.3 % (ref 10–15)
GFR SERPL CREATININE-BSD FRML MDRD: 86 ML/MIN/1.73M2
GLUCOSE SERPL-MCNC: 87 MG/DL (ref 70–99)
HAV IGG SER QL IA: NONREACTIVE
HBV SURFACE AB SERPL IA-ACNC: 0 M[IU]/ML
HBV SURFACE AG SERPL QL IA: NONREACTIVE
HCT VFR BLD AUTO: 32.1 % (ref 35–47)
HCV AB SERPL QL IA: NONREACTIVE
HGB BLD-MCNC: 10.5 G/DL (ref 11.7–15.7)
MCH RBC QN AUTO: 31.2 PG (ref 26.5–33)
MCHC RBC AUTO-ENTMCNC: 32.7 G/DL (ref 31.5–36.5)
MCV RBC AUTO: 95 FL (ref 78–100)
PATH REPORT.COMMENTS IMP SPEC: NORMAL
PATH REPORT.COMMENTS IMP SPEC: NORMAL
PATH REPORT.FINAL DX SPEC: NORMAL
PATH REPORT.MICROSCOPIC SPEC OTHER STN: NORMAL
PATH REPORT.MICROSCOPIC SPEC OTHER STN: NORMAL
PATH REPORT.RELEVANT HX SPEC: NORMAL
PLATELET # BLD AUTO: 36 10E3/UL (ref 150–450)
POTASSIUM SERPL-SCNC: 3.6 MMOL/L (ref 3.4–5.3)
PROT SERPL-MCNC: 5 G/DL (ref 6.4–8.3)
RBC # BLD AUTO: 3.37 10E6/UL (ref 3.8–5.2)
SODIUM SERPL-SCNC: 133 MMOL/L (ref 136–145)
WBC # BLD AUTO: 3.6 10E3/UL (ref 4–11)

## 2022-08-01 PROCEDURE — 250N000013 HC RX MED GY IP 250 OP 250 PS 637

## 2022-08-01 PROCEDURE — 86140 C-REACTIVE PROTEIN: CPT | Performed by: STUDENT IN AN ORGANIZED HEALTH CARE EDUCATION/TRAINING PROGRAM

## 2022-08-01 PROCEDURE — 258N000003 HC RX IP 258 OP 636: Performed by: INTERNAL MEDICINE

## 2022-08-01 PROCEDURE — 96361 HYDRATE IV INFUSION ADD-ON: CPT

## 2022-08-01 PROCEDURE — 99225 PR SUBSEQUENT OBSERVATION CARE,LEVEL II: CPT | Performed by: STUDENT IN AN ORGANIZED HEALTH CARE EDUCATION/TRAINING PROGRAM

## 2022-08-01 PROCEDURE — 36415 COLL VENOUS BLD VENIPUNCTURE: CPT | Performed by: STUDENT IN AN ORGANIZED HEALTH CARE EDUCATION/TRAINING PROGRAM

## 2022-08-01 PROCEDURE — G0378 HOSPITAL OBSERVATION PER HR: HCPCS

## 2022-08-01 PROCEDURE — 87040 BLOOD CULTURE FOR BACTERIA: CPT | Performed by: STUDENT IN AN ORGANIZED HEALTH CARE EDUCATION/TRAINING PROGRAM

## 2022-08-01 PROCEDURE — 85027 COMPLETE CBC AUTOMATED: CPT | Performed by: STUDENT IN AN ORGANIZED HEALTH CARE EDUCATION/TRAINING PROGRAM

## 2022-08-01 PROCEDURE — 250N000013 HC RX MED GY IP 250 OP 250 PS 637: Performed by: INTERNAL MEDICINE

## 2022-08-01 PROCEDURE — 99207 PR NO BILLABLE SERVICE THIS VISIT: CPT | Performed by: STUDENT IN AN ORGANIZED HEALTH CARE EDUCATION/TRAINING PROGRAM

## 2022-08-01 PROCEDURE — 80053 COMPREHEN METABOLIC PANEL: CPT | Performed by: STUDENT IN AN ORGANIZED HEALTH CARE EDUCATION/TRAINING PROGRAM

## 2022-08-01 PROCEDURE — 99225 PR SUBSEQUENT OBSERVATION CARE,LEVEL II: CPT | Mod: GC | Performed by: INTERNAL MEDICINE

## 2022-08-01 RX ORDER — TRAZODONE HYDROCHLORIDE 50 MG/1
25 TABLET, FILM COATED ORAL AT BEDTIME
COMMUNITY

## 2022-08-01 RX ADMIN — ACETAMINOPHEN 650 MG: 325 TABLET, FILM COATED ORAL at 18:50

## 2022-08-01 RX ADMIN — DOXYCYCLINE HYCLATE 100 MG: 100 CAPSULE ORAL at 08:11

## 2022-08-01 RX ADMIN — CITALOPRAM HYDROBROMIDE 20 MG: 20 TABLET ORAL at 08:11

## 2022-08-01 RX ADMIN — SODIUM CHLORIDE: 9 INJECTION, SOLUTION INTRAVENOUS at 09:54

## 2022-08-01 RX ADMIN — TRAZODONE HYDROCHLORIDE 25 MG: 50 TABLET ORAL at 20:40

## 2022-08-01 RX ADMIN — DOXYCYCLINE HYCLATE 100 MG: 100 CAPSULE ORAL at 20:40

## 2022-08-01 RX ADMIN — LATANOPROST 1 DROP: 50 SOLUTION/ DROPS OPHTHALMIC at 20:42

## 2022-08-01 RX ADMIN — LEVOTHYROXINE SODIUM 88 MCG: 88 TABLET ORAL at 08:11

## 2022-08-01 RX ADMIN — PREDNISOLONE ACETATE 1 DROP: 10 SUSPENSION/ DROPS OPHTHALMIC at 20:41

## 2022-08-01 NOTE — PROGRESS NOTES
[facility] PROGRESS NOTE     Patient:  Magali Macdonald   Date of birth 1940, Medical record number [id number]  Date of Visit:  08/01/2022           Assessment and Recommendations:     Problem List:  1. Febrile illness - Possible tick borne illness  - lyme test - neg   - SARsCoV2, Influenza A/B/RSV - neg   - erlichia/anaplasma - PCR pending   - blood cx - neg     2. Headaches without signs of meningitis/ encephalitis   3. Myalgia/athralgia   4. Nausea   5. Elevated transaminases  6. Anorexia   7. Night sweats ( on the first day of illness)   8. Lethargy   9. Leukopenia 2.8, lymphopenia and Thrombocytopenia  29  10. Hyponatremia    Assessment:  Magali Macdonald is an 82 year old female with PMH of hypothyroidism, osteoporosis and anxiety presented to ED on 7/30/22 with 6 days feeling ill with fevers, headaches ( but no visual changes/ photosensitivity) , myalgias, athralgia, nausea, no vomiting, no appetite and fatigue with nightsweats 6 days ago. Symptoms started on the day she came back after spending  11 days in [place], [place]    Was on Empiric Doxy. 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 She reports a bite and itchiness after, whiel at Cabin in [place], no visible tick.    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, fits clinical picture, anticipate 10-14 days of Doxy  - Continue  Doxycycline 100 mg po q12 hr  - Ok to hold off on LP for now given AMS resolved, and headache explained by above diagnosis no meningeal signs, likely delirium  - Check babesia PCR, coinfection possible, and will not be covered by Doxy   - Pending  West Nile virus IgM  - follow-up LFTs and CBC diff   - would avoid NSAIDs with thrombocytopenia/ monitor platelets closely     Dw primary    Serin Erayil  ID Staff          Interval events       Fever  Resolved, clinically stable, had some severe AMS yesterday, now resolved primary team wondering about LP. LFTs  "imporving.     Pt reports feeling much better, is eager to go home to take care of her  with dementia. Confusion now resolved, she believes it was environmental          Initial History of Present Illness(as of first ID visit 7/31/22)   Magali Macdonald is an 82 year old female with PMH of hypothyroidism, osteoporosis and anxiety presented to ED on 7/30/22 with 6 days feeling ill with fevers, headaches ( but no visual changes/ photosensitivity) , myalgias, athralgia, nausea, no vomiting, no appetite and fatigue with nightsweats 6 days ago.     She spent 11 days in her cabin in [place], [place] . she felt some \"bite\" on her right ankle . it was itchy. did not see any ticks on her. no close contacts are sick.no dogs at home. she saw her grand daughter who had COVID19 but out of isolation period recently.    She has been taking tylenol up to 4000 mg /day for fever and aches at home  . no rhinorrhea/sinus pain/ chest pain/shortness of breath/ cough/ diarrhea/ abdominal pain/ spinal pain/ urinary symptoms/ bleeding.     Imaging:  CXR 7/30/22  IMPRESSION: Heart size is normal. No evidence of pneumonia or pulmonary edema. No visible pneumothorax or pleural effusion.    Limited Abd US 7/30/22  IMPRESSION: Common bile duct measures 8 mm, at upper limits of normal for age. Pancreas is partially obscured, otherwise ultrasound right upper quadrant is within normal limits within normal limits     CT abdomen/pelvis w contrast 7/31/22  IMPRESSION:   1.  Mild hepatic steatosis without biliary dilatation.  2.  L3 compression fracture.  3.  Trace free pelvic fluid.  4.  Diverticulosis without diverticulitis.           Allergies:     Allergies   Allergen Reactions     Tropicamide Itching     Eyes get really red  Red and irritated eyes!     Penicillins Unknown     Thimerosal Unknown     Per pt, allergic (doesn't know symptoms)          Physical Exam:   Vitals were reviewed  Patient Vitals for the past 24 hrs:   BP Temp Temp src Pulse " Resp SpO2 Weight   08/01/22 1004 128/76 97.7  F (36.5  C) Oral 74 18 94 % --   08/01/22 0527 106/42 97.8  F (36.6  C) Oral 63 18 93 % --   08/01/22 0141 102/55 99.8  F (37.7  C) Oral 63 18 92 % 57.1 kg (125 lb 14.1 oz)   07/31/22 2259 121/65 99.7  F (37.6  C) Oral 78 18 94 % --   07/31/22 2204 -- (!) 101.3  F (38.5  C) Oral -- -- -- --   07/31/22 2031 (!) 145/87 100.1  F (37.8  C) Oral 87 16 94 % --   07/31/22 1200 (!) 158/80 -- -- -- -- 93 % --     Ranges for his vital signs:  Temp:  [97.7  F (36.5  C)-101.3  F (38.5  C)] 97.7  F (36.5  C)  Pulse:  [63-87] 74  Resp:  [16-18] 18  BP: (102-158)/(42-87) 128/76  SpO2:  [92 %-94 %] 94 %  Physical Examination:  GENERAL:  alert, oriented, lethargic , in bed in no acute distress.   HEENT:  Head is normocephalic, atraumatic   EYES:  Eyes have anicteric sclerae without conjunctival injection or stigmata of endocarditis.    ENT:  Oropharynx is dry without exudates or ulcers. Tongue is midline  LUNGS:  Breathing comfortably  SKIN:  No acute rashes.    Extremities : no edema , no joint swelling   NEUROLOGIC:  Grossly nonfocal. Active x4 extremities         Laboratory Data:     Inflammatory Markers    Recent Labs   Lab Test 08/01/22  0622 07/31/22  0727 07/30/22 1937   SED  --  12  --    .00*  --  269.00*     Hematology Studies    Recent Labs   Lab Test 08/01/22  0622 07/31/22  1236 07/30/22 1937   WBC 3.6* 2.8* 3.0*   ANEU  --  2.0 2.0   AEOS  --  0.0 0.0   HGB 10.5* 12.7 13.0   MCV 95 96 94   PLT 36* 34* 29*     Metabolic Studies     Recent Labs   Lab Test 08/01/22 0622 07/31/22 0727 07/30/22 1937   * 132* 128*   POTASSIUM 3.6 4.0 4.9   CHLORIDE 101 100 92*   CO2 22 24 25   BUN 9.9 11.0 13.0   CR 0.70 0.69 0.72   GFRESTIMATED 86 87 84     Hepatic Studies    Recent Labs   Lab Test 08/01/22 0622 07/31/22 0727 07/30/22  1937   BILITOTAL 0.5 0.6 0.8   ALKPHOS 194* 188* 229*   ALBUMIN 2.7* 2.8* 3.3*   * 196* 312*   * 168* 227*     Urine Studies     Recent Labs   Lab Test 07/30/22 2006   LEUKEST Negative   WBCU 1

## 2022-08-01 NOTE — PROGRESS NOTES
Resident/Fellow Attestation   I, Damaris Woodall MD, was present with the medical/[place] student who participated in the service and in the documentation of the note.  I have verified the history and personally performed the physical exam and medical decision making.  I agree with the assessment and plan of care as documented in the note.      Damaris Woodall MD  PGY3  Date of Service (when I saw the patient): 08/01/22    [facility]    Progress Note - Medicine Service, [facility]OON TEAM 1       Date of Admission:  7/30/2022    Assessment & Plan            Magali Macdonald is a 82 year old female with PMH of hypothyroid and anxiety presenting with one week of fever, myalgias, and fatigue found to have elevated LFTs, lymphopenia, and thrombocytopenia.      Today's Updates:    +Anaplasma, cont doxy    Thrombocytopenia, leukopenia improving     Hold of LP for now given improvement in mental status and known anaplasma, although will defer to ID recs    Likely discharge tomorrow if platelets continue to improve and ID ok with it, PT/OT ordered    # Anaplasmosis   # Fevers, myalgias, headache  # Elevated LFTs, improving  # Leukopenia, improving   # Thrombocytopenia, improving  Concerning for infection vs inflammatory process; ddx very broad. Constellation of fevers + elevated LFTs + thrombocytopenia + lymphopenia and history of recent stay in [facility] most concerning for tickborne illness. Lymes disease antibodies non detectable. Other tick born illness testing is pending. Additional infectious workup so far negative for COVID, flu, RSV; CXR with no PNA or effusions; UA not consistent with infection; Blood cultures without growth. RUQ US and CT AP show only hepatic steatosis. Tylenol level normal. No other visible etiology for elevated LFTs and Alk phos and pt has no known hx of liver disease. Elevated CRP and headaches + fevers (w/ hx of hypothyroidism and positive EMERALD in the past)  concerning for rheum etiologies, however ESR negative. Today, LFTs continue to downtrend after improving with IV fluids. Thrombocytopenia continues to mildly improve (36 on 8/1) compared to yesterday (34 on 7/31), and white count has started to mildly improve post-drop from 2.8 (7/31) --> 3.6 (8/1)  - Infxn workup:              -Continued empiric doxycycline 100 mg PO q12h              -Parasite blood smear to check for Anaplasma/ Babesia -> +anaplasma               -West Nile virus IgM pending               -Babesia, Erlichia PCR pending              -Blood cultures: No growth after 24 hours   -Hep A, B, + C negative   -Lyme Disease total abs negative  - Heme workup pending               -peripheral smear, pending  - Rheum workup pending              - RF, ANCA pending   - CK negative  - Tylenol PRN for pain relief (not to exceed 2 g per 24 hour period)     - Encouraged patient to use to treat headaches, reassured that it is safe.  - Recheck CMP and CBC in AM   -  --> 238 (8/1)     # Hyponatremia, improving  DDx most likely hypovolemic hyponatremia, as sodium improved to 132 with 2 L of NS. However, urine sodium 81 which more aligns with an SIADH type picture.  - Continue NS at 125 ml/hr  - Recheck CMP in AM. If sodium not improved from today, consider SIADH workup   - (7/30) 128 --> (7/31) 132 --> (8/1) 133     #. Hypoalbuminemia   #. Proteinuria  Ketones and proteins on urine. Proteinuria not nephrotic range. May be more indicative for acute renal injury range in setting of potential protein malnutrition  -Consider nutrition consult   -Consider PCP follow up       Chronic:  # Anxiety  PTA citalopram 20mg qday  PTA trazodone 25mg qhs     # Hypothyroid  PTA levothyroxine 88mcg        Diet: Regular Diet Adult    DVT Prophylaxis: Pneumatic Compression Devices  Lane Catheter: Not present  Central Lines: None  Cardiac Monitoring: None  Code Status: Full Code    Disposition Plan      The patient's care was  discussed with the Attending Physician, Dr. Cruz, Patient and Primary team.    Ronni Escobar  Medical Student  Medicine Service, [facility] TEAM 1  [facility]  ______________________________________________________________________    Interval History   Patient appears to be considerably more present today than yesterday. They note they do not remember much of yesterday and that their friends who visited said she was not herself. She is feeling much better today and appears to not be confused. Nurses noted significant sundowning in the evening of 7/31. They also spiked a fever of 101.3 overnight that has since normalized. Significantly improved muscle pain. Son who is an ER physician called and we updated him with the current plan and agreed with our interventions.    Data reviewed today: I reviewed all medications, new labs and imaging results over the last 24 hours. I personally reviewed no images or EKG's today.    Physical Exam   Vital Signs: Temp: 97.8  F (36.6  C) Temp src: Oral BP: 106/42 Pulse: 63   Resp: 18 SpO2: 93 % O2 Device: None (Room air)    Weight: 125 lbs 14.12 oz  Constitutional: awake, alert, cooperative, no apparent distress.  Respiratory: No increased work of breathing, good air exchange, clear to auscultation bilaterally, no crackles or wheezing  Cardiovascular: Normal apical impulse, regular rate and rhythm, and no murmur noted  Musculoskeletal: No muscle pain noted by patient. No stiff neck or limited ranged of motion noted.  Skin: no bruising or bleeding, no redness, warmth, or swelling and no jaundice  Neurologic: Awake, alert, oriented to name, place and time. Mental Status Exam:  Level of Alertness:   awake  Neuropsychiatric: General: normal, calm and normal eye contact  Level of consciousness: alert / normal  Orientation: oriented to self, place, time and situation    Data   Recent Labs   Lab 08/01/22  0622 07/31/22  1236 07/31/22  [id number]  07/30/22  1937   WBC 3.6* 2.8*  --  3.0*   HGB 10.5* 12.7  --  13.0   MCV 95 96  --  94   PLT 36* 34*  --  29*   INR  --   --   --  0.82*   *  --  132* 128*   POTASSIUM 3.6  --  4.0 4.9   CHLORIDE 101  --  100 92*   CO2 22  --  24 25   BUN 9.9  --  11.0 13.0   CR 0.70  --  0.69 0.72   ANIONGAP 10  --  8 11   SHOLA 7.6*  --  7.8* 8.7*   GLC 87  --  85 99   ALBUMIN 2.7*  --  2.8* 3.3*   PROTTOTAL 5.0*  --  5.4* 6.2*   BILITOTAL 0.5  --  0.6 0.8   ALKPHOS 194*  --  188* 229*   *  --  168* 227*   *  --  196* 312*   LIPASE  --   --   --  12*     No results found for this or any previous visit (from the past 24 hour(s)).  Medications     sodium chloride 75 mL/hr at 08/01/22 0954       citalopram  20 mg Oral Daily     doxycycline hyclate  100 mg Oral Q12H [facility] (08/20)     latanoprost  1 drop Left Eye At Bedtime     levothyroxine  88 mcg Oral QAM AC     prednisoLONE acetate  1 drop Left Eye BID     traZODone  25 mg Oral At Bedtime

## 2022-08-01 NOTE — PLAN OF CARE
"Blood pressure 131/82, pulse 69, temperature 97.9  F (36.6  C), temperature source Oral, resp. rate 18, height 1.549 m (5' 1\"), weight 57.1 kg (125 lb 14.1 oz), SpO2 97 %.      ORIENTATION: Alert and oriented by 4, able to make needs known    PAIN: No complaints of pain    SKIN: Intact-- various bruises on bilateral upper extremities    LINES/DRAINS: R PIV currently infusing NS at 75 ml/hr    TRANSFERS: IND    CARDIAC: WDL    RESPIRATORY: WDL, RA    LABS/IMAGING: Positive blood cultures for Anaplasm-- team is aware.         SUMMARY OF SHIFT: Pt able to consume breakfast and lunch, tolerated well. Plan still undecided-- pt may or may not discharge home based on ID consult.     Continue with plan of care      "

## 2022-08-01 NOTE — PROGRESS NOTES
Observation goals:  -diagnostic tests and consults completed and resulted: not met still waiting on results  -vital signs normal or at patient baseline: no met temperature still slightly elevated.   -safe disposition plan has been identified: goal met

## 2022-08-01 NOTE — PROGRESS NOTES
Observation goals:  -diagnostic tests and consults completed and resulted: not met still waiting on results  -vital signs normal or at patient baseline: no met temperature still slightly elevated.   -safe disposition plan has been identified: goal met pt getting hourly rounding

## 2022-08-01 NOTE — UTILIZATION REVIEW
Concurrent stay review; Secondary Review Determination      Under the authority of the Utilization Management Committee, the utilization review process indicated a secondary review on the above patient. The review outcome is based on review of the medical records, discussions with staff, and applying clinical experience noted on the date of the review.      (x) Observation Status Appropriate - Concurrent stay review      RATIONALE FOR DETERMINATION:   82 year old female with PMH of hypothyroid and anxiety presenting with one week of fever, myalgias, and fatigue found to have elevated LFTs, lymphopenia, and thrombocytopenia.  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    Vital signs notable for max temp of 100.1 the past 24 hours.  No hypoxia with stable BP and HR    Labs notable for elevated CRP at 269.  AST and ALT in the 200-300 range.  Platelet count near 30.    CT abdomen unremarkable.    Blood smear positive for anaplasma    The patient remains on oral doxycycline for treatment.  She is receiving oral zofran and oral acetaminophen for symptom management.      Progress notes today indicate that the patient feels and appears significantly improved today.    Discharge is anticipated tomorrow.  For now would continue observation status.  If unable to discharge by tomorrow reassess for inpatient status.       Patient is clinically improving and there is no clear indication to change patient's status to inpatient. The severity of illness, intensity of service provided, expected LOS and risk for adverse outcome make the care appropriate for observation.      The information on this document is developed by the utilization review team in order for the business office to ensure compliance. This only denotes the appropriateness of proper admission status and does not reflect the quality of care rendered.   The definitions of Inpatient Status and Observation Status used in making the determination above are  those provided in the CMS Coverage Manual, Chapter 1 and Chapter 6, section 70.4.      Sincerely,      Thuan Royal MD  Utilization Review   Physician Advisor   [facility].

## 2022-08-01 NOTE — PLAN OF CARE
A&O times 4. Pt denies SOB, nausea, and dizziness. Pt states that she has no pain but that she has a HA. Prn medication given per MAR for HA and pt was sleeping on reassessment. Pt did not want skin check done till the morning after she had woken up. Pt has no further needs and is safe with call light in reach.     Plan of Care Reviewed With: patient     Overall Patient Progress: no change

## 2022-08-01 NOTE — PHARMACY-ADMISSION MEDICATION HISTORY
Admission Medication History Completed by Pharmacy    See [facility] Admission Navigator for allergy information, preferred outpatient pharmacy, prior to admission medications and immunization status.     Medication History Sources:     Reviewed Surescripts dispense history    Reviewed opthalmology notes on 3/21/2022 and 4/27/2022     Reviewed Family Medicine note (Care Everywhere) from 5/2/2022    Changes made to PTA medication list (reason):    Added: None    Deleted: None    Changed:   o Trazodone from 0.5 tab to 50mg tablet, take 1/2 tablet (25mg) by mouth at bedtime  o Citalopram: added dose of 20mg by mouth daily (dispensed 5/9/2022 for 90 day supply, #90)  o Levothyroxine 75mcg by mouth daily changed to 88mcg by mouth daily (dispensed 5/9/2022 for 90 day supply, #90)    Additional Information:    Medication history done using dispense history and provider notes.    Prior to Admission medications    Medication Sig Last Dose Taking? Auth Provider Long Term End Date   calcium carb 1250 mg, 500 mg [place],/vitamin D 200 units (OSCAL WITH D) 500-200 MG-UNIT per tablet Take 1 tablet by mouth 2 times daily (with meals) Unknown at Unknown time Yes Reported, Patient     CITALOPRAM HYDROBROMIDE PO Take 20 mg by mouth daily 7/30/2022 at Unknown time Yes Reported, Patient Yes    Cyanocobalamin (VITAMIN B 12 PO)  Unknown at Unknown time Yes Reported, Patient     latanoprost (XALATAN) 0.005 % ophthalmic solution Place 1 drop Into the left eye At Bedtime Unknown at Unknown time Yes Moises Rudd MD Yes    levothyroxine (SYNTHROID) 25 mcg/mL Take 88 mg by mouth daily 7/30/2022 at Unknown time Yes Reported, Patient Yes    Omega-3 Fatty Acids (OMEGA-3 FISH OIL PO)  Unknown at Unknown time Yes Reported, Patient     prednisoLONE acetate (PRED FORTE) 1 % ophthalmic suspension Place 1 drop Into the left eye 2 times daily Unknown at Unknown time Yes Moises Rudd MD Yes    traZODone (DESYREL) 50 MG tablet Take 25 mg by mouth  At Bedtime 7/30/2022 Yes Unknown, Entered By History Yes        Date completed: 08/01/22    Medication history completed by: Katia Castaneda, Pharm.D., BCPS, [facility]  Pager [phone]

## 2022-08-01 NOTE — PROGRESS NOTES
Patient's temp 100.1 ,writer offered patient tylenol for temperature, patient refused.     Patient's son Keenan [phone] called with questions about patient's care and testing. Writer paged Dr. Cerna with this concern. Questions include reasons for medications and testing.

## 2022-08-01 NOTE — PROGRESS NOTES
Temperature 101.3 patient was offered tylenol and is concerned that it will affect her liver, ice pack offered. Ice pack to patient's forehead. Patient resting in bed with bed alarm on for safety.   MD updated about temp.   Will continue to monitor.

## 2022-08-01 NOTE — PROVIDER NOTIFICATION
Notified LIP on call that pt had a sepsis alert from previous VS but that they were stable at the moment. LIP stated to just keep monitoring and skip the lactic.    Notified LIP on call that pt was still having a HA and if we could get some motrin on. RN will continue to monitor.

## 2022-08-01 NOTE — PROGRESS NOTES
Observation goals:  -diagnostic tests and consults completed and resulted: MET  -vital signs normal or at patient baseline: MET  -safe disposition plan has been identified: Not met

## 2022-08-01 NOTE — PROGRESS NOTES
Observation Goals    Diagnostic tests and consults completed and resulted    - Met  Vital signs normal or at patient baseline    - Met  Safe disposition plan has been identified   - Not met

## 2022-08-01 NOTE — PLAN OF CARE
"/82 (BP Location: Left arm)   Pulse 69   Temp 97.9  F (36.6  C) (Oral)   Resp 18   Ht 1.549 m (5' 1\")   Wt 57.1 kg (125 lb 14.1 oz)   SpO2 97%   BMI 23.79 kg/m       1500 - 1900  Patient alert and oriented. VS stable. Patient on room air. Patient complains of headache. PRN Tylenol given (See MAR). Patient denies nausea. Voiding adequately. BM during shift. Nutrition - Regular diet. PIV - NS @ 75 ml/hr. PIV - saline locked. Activity - UAL. Plan of Care - Will continue to monitor and notify care team of any changes. Possible discharge tomorrow.       "

## 2022-08-02 VITALS
DIASTOLIC BLOOD PRESSURE: 96 MMHG | HEIGHT: 61 IN | SYSTOLIC BLOOD PRESSURE: 158 MMHG | RESPIRATION RATE: 16 BRPM | HEART RATE: 77 BPM | TEMPERATURE: 98.2 F | WEIGHT: 128.97 LBS | BODY MASS INDEX: 24.35 KG/M2 | OXYGEN SATURATION: 96 %

## 2022-08-02 LAB
ALBUMIN SERPL BCG-MCNC: 2.4 G/DL (ref 3.5–5.2)
ALP SERPL-CCNC: 171 U/L (ref 35–104)
ALT SERPL W P-5'-P-CCNC: 97 U/L (ref 10–35)
ANCA AB PATTERN SER IF-IMP: NORMAL
ANION GAP SERPL CALCULATED.3IONS-SCNC: 6 MMOL/L (ref 7–15)
AST SERPL W P-5'-P-CCNC: 96 U/L (ref 10–35)
BASOPHILS # BLD MANUAL: 0 10E3/UL (ref 0–0.2)
BASOPHILS NFR BLD MANUAL: 0 %
BILIRUB SERPL-MCNC: 0.4 MG/DL
BUN SERPL-MCNC: 10.7 MG/DL (ref 8–23)
C-ANCA TITR SER IF: NORMAL {TITER}
CALCIUM SERPL-MCNC: 7.6 MG/DL (ref 8.8–10.2)
CHLORIDE SERPL-SCNC: 107 MMOL/L (ref 98–107)
CREAT SERPL-MCNC: 0.64 MG/DL (ref 0.51–0.95)
CRP SERPL-MCNC: 136 MG/L
DEPRECATED HCO3 PLAS-SCNC: 23 MMOL/L (ref 22–29)
EOSINOPHIL # BLD MANUAL: 0.1 10E3/UL (ref 0–0.7)
EOSINOPHIL NFR BLD MANUAL: 2 %
ERYTHROCYTE [DISTWIDTH] IN BLOOD BY AUTOMATED COUNT: 15.6 % (ref 10–15)
GFR SERPL CREATININE-BSD FRML MDRD: 88 ML/MIN/1.73M2
GLUCOSE SERPL-MCNC: 111 MG/DL (ref 70–99)
HCT VFR BLD AUTO: 32.3 % (ref 35–47)
HGB BLD-MCNC: 10.6 G/DL (ref 11.7–15.7)
LYMPHOCYTES # BLD MANUAL: 1.4 10E3/UL (ref 0.8–5.3)
LYMPHOCYTES NFR BLD MANUAL: 36 %
MCH RBC QN AUTO: 30.8 PG (ref 26.5–33)
MCHC RBC AUTO-ENTMCNC: 32.8 G/DL (ref 31.5–36.5)
MCV RBC AUTO: 94 FL (ref 78–100)
METAMYELOCYTES # BLD MANUAL: 0 10E3/UL
METAMYELOCYTES NFR BLD MANUAL: 1 %
MONOCYTES # BLD MANUAL: 0.4 10E3/UL (ref 0–1.3)
MONOCYTES NFR BLD MANUAL: 11 %
NEUTROPHILS # BLD MANUAL: 2 10E3/UL (ref 1.6–8.3)
NEUTROPHILS NFR BLD MANUAL: 50 %
PLAT MORPH BLD: NORMAL
PLATELET # BLD AUTO: 78 10E3/UL (ref 150–450)
POTASSIUM SERPL-SCNC: 3.4 MMOL/L (ref 3.4–5.3)
PROT SERPL-MCNC: 4.8 G/DL (ref 6.4–8.3)
RBC # BLD AUTO: 3.44 10E6/UL (ref 3.8–5.2)
RBC MORPH BLD: NORMAL
RHEUMATOID FACT SER NEPH-ACNC: 6 IU/ML
SODIUM SERPL-SCNC: 136 MMOL/L (ref 136–145)
WBC # BLD AUTO: 3.9 10E3/UL (ref 4–11)
WNV IGM SER IA-ACNC: 0 IV

## 2022-08-02 PROCEDURE — 999N000111 HC STATISTIC OT IP EVAL DEFER

## 2022-08-02 PROCEDURE — 86140 C-REACTIVE PROTEIN: CPT | Performed by: STUDENT IN AN ORGANIZED HEALTH CARE EDUCATION/TRAINING PROGRAM

## 2022-08-02 PROCEDURE — G0378 HOSPITAL OBSERVATION PER HR: HCPCS

## 2022-08-02 PROCEDURE — 99217 PR OBSERVATION CARE DISCHARGE: CPT | Mod: GC | Performed by: PEDIATRICS

## 2022-08-02 PROCEDURE — 999N000147 HC STATISTIC PT IP EVAL DEFER

## 2022-08-02 PROCEDURE — 85027 COMPLETE CBC AUTOMATED: CPT | Performed by: STUDENT IN AN ORGANIZED HEALTH CARE EDUCATION/TRAINING PROGRAM

## 2022-08-02 PROCEDURE — 96361 HYDRATE IV INFUSION ADD-ON: CPT

## 2022-08-02 PROCEDURE — 80053 COMPREHEN METABOLIC PANEL: CPT | Performed by: STUDENT IN AN ORGANIZED HEALTH CARE EDUCATION/TRAINING PROGRAM

## 2022-08-02 PROCEDURE — 85007 BL SMEAR W/DIFF WBC COUNT: CPT | Performed by: STUDENT IN AN ORGANIZED HEALTH CARE EDUCATION/TRAINING PROGRAM

## 2022-08-02 PROCEDURE — 250N000013 HC RX MED GY IP 250 OP 250 PS 637: Performed by: INTERNAL MEDICINE

## 2022-08-02 PROCEDURE — 250N000013 HC RX MED GY IP 250 OP 250 PS 637

## 2022-08-02 PROCEDURE — 99225 PR SUBSEQUENT OBSERVATION CARE,LEVEL II: CPT | Performed by: STUDENT IN AN ORGANIZED HEALTH CARE EDUCATION/TRAINING PROGRAM

## 2022-08-02 PROCEDURE — 36415 COLL VENOUS BLD VENIPUNCTURE: CPT | Performed by: STUDENT IN AN ORGANIZED HEALTH CARE EDUCATION/TRAINING PROGRAM

## 2022-08-02 RX ORDER — LEVOTHYROXINE SODIUM 88 UG/1
88 TABLET ORAL
Qty: 30 TABLET | Refills: 0 | Status: SHIPPED | OUTPATIENT
Start: 2022-08-03

## 2022-08-02 RX ORDER — DOXYCYCLINE 100 MG/1
100 CAPSULE ORAL EVERY 12 HOURS
Qty: 18 CAPSULE | Refills: 0 | Status: SHIPPED | OUTPATIENT
Start: 2022-08-02 | End: 2022-08-11

## 2022-08-02 RX ADMIN — LEVOTHYROXINE SODIUM 88 MCG: 88 TABLET ORAL at 07:36

## 2022-08-02 RX ADMIN — DOXYCYCLINE HYCLATE 100 MG: 100 CAPSULE ORAL at 07:36

## 2022-08-02 RX ADMIN — PREDNISOLONE ACETATE 1 DROP: 10 SUSPENSION/ DROPS OPHTHALMIC at 07:37

## 2022-08-02 RX ADMIN — CITALOPRAM HYDROBROMIDE 20 MG: 20 TABLET ORAL at 07:36

## 2022-08-02 RX ADMIN — ACETAMINOPHEN 650 MG: 325 TABLET, FILM COATED ORAL at 05:09

## 2022-08-02 NOTE — PLAN OF CARE
Goal Outcome Evaluation:  0000-VSS, All test appears to be resulted and receiving treatment. No complaints of pain or discomfort.  0400- VSS, All test appears to be resulted and receiving treatment. Will continue to monitor and report any significant changes.

## 2022-08-02 NOTE — DISCHARGE SUMMARY
[facility]  Discharge Summary - Medicine & Pediatrics       Date of Admission:  7/30/2022  Date of Discharge:  8/2/2022  Discharging Provider: Damaris Woodall MD (PGY-3); Inocencio Alfonso MD (attending)   Discharge Service: Medicine Service, [facility] TEAM 1    Discharge Diagnoses   Anaplasmosis   Transaminitis - improving   Thrombocytopenia - improving   Lymphopenia - improving   Fevers, myalgias, headache - improving   Acute metabolic encephalopathy - suspect infection-related and now resolved  Hyponatremia - resolved  Hypoalbuminemia   Proteinuria, mild     Follow-ups Needed After Discharge   Referred to [facility] PCP per pt preference. Labs to follow: CMP and CBC in 1 week.  Alternatively can have these done with current PCP if she would prefer.   Would also recommend repeating UA in a few months given proteinuria on admission.    Unresulted Labs Ordered in the Past 30 Days of this Admission     Date and Time Order Name Status Description    7/31/2022 10:34 PM Blood Culture Hand, Left Preliminary     7/31/2022 10:34 PM Blood Culture Arm, Left Preliminary     7/31/2022  3:39 PM West Nile Virus Antibody IgM In process     7/31/2022  2:44 AM Babesia Species by PCR In process     7/30/2022 11:46 PM ANCA IgG by IFA with Reflex to Titer In process     7/30/2022 11:46 PM Rheumatoid factor In process     7/30/2022 10:01 PM Blood Culture Hand, Right Preliminary     7/30/2022 10:01 PM Blood Culture Arm, Left Preliminary     7/30/2022  9:22 PM Ehrlichia Anaplasma Sp by PCR In process       These results will be followed up by inpatient team--patient will be contacted with any abnormal results. We will also connect with ID if babesia comes back positive.     Discharge Disposition   Discharged to home  Condition at discharge: Good    Hospital Course   Magali Macdonald was admitted on 7/30/2022 for fevers, thrombocytopenia, transaminitis, lymphopenia.  The following problems were addressed during her  hospitalization:    # Anaplasmosis   # Fevers, myalgias, headache  # Encephalopathy (suspect infection-related) - improving  # Elevated LFTs, improving  # Leukopenia, improving   # Thrombocytopenia, improving  P/w 5 days fevers, myalgias, HA, confusion and found to have elevated LFTs with transaminitis to the 300s, , platelets in 30s, and lymphopenia, most c/f possible tick-borne illness given this constellation of findings. History of recent stay in [facility] without known tick bite, however did have bug bite with significant redness and inflammation afterwards. Work-up revealed negative Lyme, +anaplasmosis on blood smear. Babesia pending at time of discharge. ID was involved and recommended doxycycline to complete 10 day course. Discussed LP given initial encephalopathy and HA, however resolution of confusion with treatment of anaplasmosis reassuring that this was likely etiology for encephalopathy. HA improving at time of discharge. Also had basic rheum w/u with neg EMERALD, RF, ANCA. LFTs and thrombocytopenia, lymphopenia improved significantly with doxy.   - babesia collected and pending at time of discharge-- would not be covered by doxy so we will f/u and if positive will reach out to ID  - continue doxy to complete 10 day course  - PCP follow-up with repeat CMP, CBC in 1 week      # Hyponatremia, resolved  DDx most likely hypovolemic hyponatremia, as sodium improved to 132 with 2 L of NS. However, urine sodium 81 which more aligns with an SIADH type picture.  - Na 136 on day of discharge, suspect hypovolemic      #. Hypoalbuminemia   #. Proteinuria  Ketones and proteins in urine. Proteinuria not nephrotic range. May be more indicative for acute renal injury range in setting of potential protein malnutrition  - PCP follow up       Chronic:  # Anxiety  PTA citalopram 20mg qday  PTA trazodone 25mg qhs     # Hypothyroid  PTA levothyroxine 88mcg    Consultations This Hospital Stay   NURSING TO CONSULT FOR  VASCULAR ACCESS CARE IP CONSULT  INFECTIOUS DISEASE GENERAL ADULT IP CONSULT  NURSING TO CONSULT FOR VASCULAR ACCESS CARE IP CONSULT  PHYSICAL THERAPY ADULT IP CONSULT  OCCUPATIONAL THERAPY ADULT IP CONSULT    Code Status   Full Code       The patient was discussed with Dr. Alfonso.     MD Jina Quevedo [address] [place] [address] [address] [place] [id number]  Phone: [phone]  ______________________________________________________________________    Physical Exam   Vital Signs: Temp: 98.2  F (36.8  C) Temp src: Oral BP: (!) 158/96 Pulse: 77   Resp: 16 SpO2: 96 % O2 Device: None (Room air)    Weight: 128 lbs 15.51 oz  Constitutional: awake, alert, cooperative, no apparent distress.  Respiratory: No increased work of breathing, good air exchange, clear to auscultation bilaterally, no crackles or wheezing  Cardiovascular: Normal apical impulse, regular rate and rhythm, and no murmur noted  Musculoskeletal: No muscle pain noted by patient. No stiff neck or limited ranged of motion noted.  Skin: no bruising or bleeding, no redness, warmth, or swelling and no jaundice  Neurologic: Awake, alert, oriented to name, place and time. Mental Status Exam:  Level of Alertness:   awake  Neuropsychiatric: General: normal, calm and normal eye contact  Level of consciousness: alert / normal  Orientation: oriented to self, place, time and situation      Primary Care Physician   JESÚS Yanes    Discharge Orders   No discharge procedures on file.    Significant Results and Procedures   Results for orders placed or performed during the hospital encounter of 07/30/22   US Abdomen Limited (RUQ)    Narrative    EXAMINATION: Limited Abdominal Ultrasound, 7/30/2022 11:29 PM     COMPARISON: None.    History: elevated LFTs.     FINDINGS:   Fluid: No evidence of ascites or pleural effusions.    Liver: The liver demonstrates normal echotexture, measuring 13.7 cm in  craniocaudal dimension.  There is no focal mass. Main portal vein is  patent with antegrade flow towards the liver.    Gallbladder: There is no wall thickening, pericholecystic fluid,  positive sonographic Persaud's sign or evidence for cholelithiasis.    Bile Ducts: Both the intra- and extrahepatic biliary system are of  normal caliber.  The common bile duct measures 8 mm in diameter,  normal given the patient's age.    Pancreas: Visualized portions of the head and body of the pancreas are  unremarkable. Pancreas is partially obscured    Kidney: The right kidney measures 9.6 cm long. There is no  hydronephrosis or hydroureter, no shadowing renal calculi, cystic  lesion or mass.       Impression    IMPRESSION: Common bile duct measures 8 mm, at upper limits of normal  for age. Pancreas is partially obscured, otherwise ultrasound right  upper quadrant is within normal limits within normal limits    I have personally reviewed the examination and initial interpretation  and I agree with the findings.    CAROL JUSTICE MD         SYSTEM ID:  [id number]   XR Chest Port 1 View    Narrative    EXAM: XR CHEST PORT 1 VIEW  LOCATION: [facility]  DATE/TIME: 7/30/2022 10:07 PM    INDICATION: fever  COMPARISON: None.      Impression    IMPRESSION: Heart size is normal. No evidence of pneumonia or pulmonary edema. No visible pneumothorax or pleural effusion.   CT Abdomen Pelvis w Contrast    Narrative    EXAM: CT ABDOMEN PELVIS W CONTRAST  LOCATION: [facility]  DATE/TIME: 7/31/2022 12:26 AM    INDICATION: fever, leukopenia, elevated liver enzymes  COMPARISON: None.  TECHNIQUE: CT scan of the abdomen and pelvis was performed following injection of IV contrast. Multiplanar reformats were obtained. Dose reduction techniques were used.  CONTRAST: 73 mL Isovue-[id number]    FINDINGS:   LOWER CHEST: Small hiatal hernia    HEPATOBILIARY: Mild steatosis    PANCREAS:  Unremarkable    SPLEEN: Unremarkable    ADRENAL GLANDS: Unremarkable    KIDNEYS/BLADDER: No hydronephrosis. Normal outer contour.    BOWEL: Diverticulosis without diverticulitis.    LYMPH NODES: No significant retroperitoneal adenopathy    VASCULATURE: Scattered atherosclerotic plaque without abdominal aortic aneurysm    PELVIC ORGANS: Small volume of free pelvic fluid.    MUSCULOSKELETAL: High-grade compression fracture/vertebral plana deformity at L3, age indeterminate.      Impression    IMPRESSION:   1.  Mild hepatic steatosis without biliary dilatation.  2.  L3 compression fracture.  3.  Trace free pelvic fluid.  4.  Diverticulosis without diverticulitis.       Discharge Medications   Discharge Medication List as of 8/2/2022 12:10 PM      START taking these medications    Details   doxycycline hyclate (VIBRAMYCIN) 100 MG capsule Take 1 capsule (100 mg) by mouth every 12 hours for 9 days, Disp-18 capsule, R-0, E-Prescribe      levothyroxine (SYNTHROID/LEVOTHROID) 88 MCG tablet Take 1 tablet (88 mcg) by mouth every morning (before breakfast), Disp-30 tablet, R-0, E-Prescribe         CONTINUE these medications which have NOT CHANGED    Details   calcium carb 1250 mg, 500 mg [place],/vitamin D 200 units (OSCAL WITH D) 500-200 MG-UNIT per tablet Take 1 tablet by mouth 2 times daily (with meals), Historical      CITALOPRAM HYDROBROMIDE PO Take 20 mg by mouth daily, Historical      Cyanocobalamin (VITAMIN B 12 PO) Historical      latanoprost (XALATAN) 0.005 % ophthalmic solution Place 1 drop Into the left eye At Bedtime, Disp-2.5 mL, R-5, E-Prescribe      Omega-3 Fatty Acids (OMEGA-3 FISH OIL PO) Historical      prednisoLONE acetate (PRED FORTE) 1 % ophthalmic suspension Place 1 drop Into the left eye 2 times daily, Disp-10 mL, R-5, E-Prescribe      traZODone (DESYREL) 50 MG tablet Take 25 mg by mouth At Bedtime, Historical         STOP taking these medications       levothyroxine (SYNTHROID) 25 mcg/mL Comments:   Reason  for Stopping:             Allergies   Allergies   Allergen Reactions     Tropicamide Itching     Eyes get really red  Red and irritated eyes!     Penicillins Unknown     Thimerosal Unknown     Per pt, allergic (doesn't know symptoms)

## 2022-08-02 NOTE — PLAN OF CARE
Discharged to: Home  Transportation: Arranged private transportation  Time: 1230  Prescriptions: Reviewed w/pt - pt to  at preferred pharmacy  Belongings: All belongings accounted for and sent w/pt  PIV/Access: PIV removed  Care Plan and Education discontinued: Yes  Paperwork: AVS reviewed and sent w/pt    Reason for Admission: Hyponatremia    RN assumed cares at 0700    Pain: Pt denies pain  Neuro: A/Ox4, denies n/t, calls appropriately  Cardiac: WDL - denies chest pain  Respiratory: RA, sating upper 90s, denies SOB  GI/: No BM reported on shift; voiding spontaneously w/o difficulty  IV/Drains: R PIV - removed  Activity: UAL  Skin: WDL  Labs: Reviewed    Plan of Care: Pt discharged home

## 2022-08-02 NOTE — PLAN OF CARE
[id number]  Afebrile. Hypertensive (146/71). OVSS. A&O x4. Denies pain. Denies n/v. MIVF infusing at 75 ml/hr. Possible discharge tomorrow. Independent. Continue plan of care.         Problem: Plan of Care - These are the overarching goals to be used throughout the patient stay.    Goal: Plan of Care Review/Shift Note  Description: The Plan of Care Review/Shift note should be completed every shift.  The Outcome Evaluation is a brief statement about your assessment that the patient is improving, declining, or no change.  This information will be displayed automatically on your shift note.  Outcome: Ongoing, Not Progressing  Flowsheets (Taken 8/1/2022 2222)  Plan of Care Reviewed With: patient  Overall Patient Progress: no change     Problem: Plan of Care - These are the overarching goals to be used throughout the patient stay.    Goal: Absence of Hospital-Acquired Illness or Injury  Outcome: Ongoing, Not Progressing     Problem: Plan of Care - These are the overarching goals to be used throughout the patient stay.    Goal: Optimal Comfort and Wellbeing  Outcome: Ongoing, Not Progressing     Problem: Plan of Care - These are the overarching goals to be used throughout the patient stay.    Goal: Readiness for Transition of Care  Outcome: Ongoing, Not Progressing   Goal Outcome Evaluation:    Plan of Care Reviewed With: patient     Overall Patient Progress: no change

## 2022-08-02 NOTE — PROGRESS NOTES
[facility] PROGRESS NOTE     Patient:  Magali Macdonald   Date of birth 1940, Medical record number [id number]  Date of Visit:  08/02/2022           Assessment and Recommendations:     Problem List:  1. Febrile illness  2. Headaches without signs of meningitis/ encephalitis   3. Myalgia/athralgia   4. Nausea   5. Elevated transaminases  6. Anorexia   7. Night sweats ( on the first day of illness)   8. Lethargy   9. Leukopenia, lymphopenia and Thrombocytopenia  10. Hyponatremia    Assessment:  Magali Macdonald is an 82 year old female with PMH of hypothyroidism, osteoporosis and anxiety who on 7/30/22 with 6 days feeling ill with fevers, headaches ( but no visual changes/ photosensitivity) , myalgias, athralgia, nausea, no vomiting, no appetite and fatigue with nightsweats 6 days ago. Symptoms started on the day she came back after spending  11 days in [place], [place]. Lyme, covid neg. Associated with leucopenia, TCP    Was on Empiric Doxy. Parasite smear showed  Anaplasma phagocytophilum. She reports a bite and itchiness after, while at Cabin in [place], no visible tick.    Fever resolved, clinically stable, had some severe AMS 7/31, now resolved, likely delirium. LFTs, platelets, improving, CRP trending down  Pt reports feeling much better, is eager to go home to take care of her  with dementia.     RECOMMENDATIONS:  - Continue  Doxycycline 100 mg po q12 hr x 10 days  - Ok to discharge from ID perspective, please ensure primary follow up within 1 week, with repeat labs including CBC,CMP (pt would like to have a primary through [place])  - Follow babesia PCR, coinfection possible, and will not be covered by Doxy, please reach out to ID is this comes back positive  - Pending  West Nile virus IgM, anaplasma/ehrlichia PCR    Dw primary    Serin Erayil  ID Staff          Interval events     Pt reports overall feeling better, no fevers, chills, joint pains, muscle aches, headache, no AMS.  "What is remaining is the feeling like she just had the flu    Primary wondering if ok to discharge        Initial History of Present Illness(as of first ID visit 7/31/22)   Magali Macdonald is an 82 year old female with PMH of hypothyroidism, osteoporosis and anxiety presented to ED on 7/30/22 with 6 days feeling ill with fevers, headaches ( but no visual changes/ photosensitivity) , myalgias, athralgia, nausea, no vomiting, no appetite and fatigue with nightsweats 6 days ago.     She spent 11 days in her cabin in [place], [place] . she felt some \"bite\" on her right ankle . it was itchy. did not see any ticks on her. no close contacts are sick.no dogs at home. she saw her grand daughter who had COVID19 but out of isolation period recently.    She has been taking tylenol up to 4000 mg /day for fever and aches at home  . no rhinorrhea/sinus pain/ chest pain/shortness of breath/ cough/ diarrhea/ abdominal pain/ spinal pain/ urinary symptoms/ bleeding.     Imaging:  CXR 7/30/22  IMPRESSION: Heart size is normal. No evidence of pneumonia or pulmonary edema. No visible pneumothorax or pleural effusion.    Limited Abd US 7/30/22  IMPRESSION: Common bile duct measures 8 mm, at upper limits of normal for age. Pancreas is partially obscured, otherwise ultrasound right upper quadrant is within normal limits within normal limits     CT abdomen/pelvis w contrast 7/31/22  IMPRESSION:   1.  Mild hepatic steatosis without biliary dilatation.  2.  L3 compression fracture.  3.  Trace free pelvic fluid.  4.  Diverticulosis without diverticulitis.           Allergies:     Allergies   Allergen Reactions     Tropicamide Itching     Eyes get really red  Red and irritated eyes!     Penicillins Unknown     Thimerosal Unknown     Per pt, allergic (doesn't know symptoms)          Physical Exam:   Vitals were reviewed  Patient Vitals for the past 24 hrs:   BP Temp Temp src Pulse Resp SpO2 Weight   08/02/22 1049 (!) 158/96 98.2  F (36.8  C) Oral 77 -- " 96 % --   08/02/22 0548 120/51 98.2  F (36.8  C) Oral 68 16 91 % 58.5 kg (128 lb 15.5 oz)   08/01/22 2210 (!) 146/71 97.7  F (36.5  C) Oral 88 16 93 % --   08/01/22 1828 133/59 97.9  F (36.6  C) Oral 72 16 94 % --   08/01/22 1348 131/82 97.9  F (36.6  C) Oral 69 18 97 % --     Ranges for his vital signs:  Temp:  [97.7  F (36.5  C)-98.2  F (36.8  C)] 98.2  F (36.8  C)  Pulse:  [68-88] 77  Resp:  [16-18] 16  BP: (120-158)/(51-96) 158/96  SpO2:  [91 %-97 %] 96 %  Physical Examination:  GENERAL:  alert, oriented, lethargic , in bed in no acute distress.   HEENT:  Head is normocephalic, atraumatic   EYES:  Eyes have anicteric sclerae without conjunctival injection or stigmata of endocarditis.    ENT:  Oropharynx is dry without exudates or ulcers. Tongue is midline  LUNGS:  Breathing comfortably  SKIN:  No acute rashes.    Extremities : no edema , no joint swelling   NEUROLOGIC:  Grossly nonfocal. Active x4 extremities         Laboratory Data:     Inflammatory Markers    Recent Labs   Lab Test 08/02/22  0555 08/01/22 0622 07/31/22 0727 07/30/22 1937   SED  --   --  12  --    .00* 238.00*  --  269.00*     Hematology Studies    Recent Labs   Lab Test 08/02/22  0555 08/01/22  0622 07/31/22  1236 07/30/22 1937   WBC 3.9* 3.6* 2.8* 3.0*   ANEU 2.0  --  2.0 2.0   AEOS 0.1  --  0.0 0.0   HGB 10.6* 10.5* 12.7 13.0   MCV 94 95 96 94   PLT 78* 36* 34* 29*     Metabolic Studies     Recent Labs   Lab Test 08/02/22  0555 08/01/22 0622 07/31/22 0727 07/30/22 1937    133* 132* 128*   POTASSIUM 3.4 3.6 4.0 4.9   CHLORIDE 107 101 100 92*   CO2 23 22 24 25   BUN 10.7 9.9 11.0 13.0   CR 0.64 0.70 0.69 0.72   GFRESTIMATED 88 86 87 84     Hepatic Studies    Recent Labs   Lab Test 08/02/22  0555 08/01/22  0622 07/31/22  0727 07/30/22  1937   BILITOTAL 0.4 0.5 0.6 0.8   ALKPHOS 171* 194* 188* 229*   ALBUMIN 2.4* 2.7* 2.8* 3.3*   AST 96* 154* 196* 312*   ALT 97* 125* 168* 227*     Urine Studies    Recent Labs   Lab Test  07/30/22 2006   LEUKEST Negative   WBCU 1

## 2022-08-02 NOTE — PROGRESS NOTES
Observation Goals     -diagnostic tests and consults completed and resulted: met    -vital signs normal or at patient baseline: met     -safe disposition plan has been identified:met

## 2022-08-02 NOTE — PROGRESS NOTES
Observation Goals    -diagnostic tests and consults completed and resulted: met    -vital signs normal or at patient baseline: met     -safe disposition plan has been identified: not met

## 2022-08-02 NOTE — PLAN OF CARE
7A Defer    Physical Therapy: Orders received. Chart reviewed and discussed with care team.? Physical Therapy not indicated due to mobility status. Pt denies IP PT or OT needs, reports IND with mobility and ADLs. Denies concern with return to home and is eager to discharge.? Defer discharge recommendations to medical team, anticipate return to home setting.? Will complete orders.

## 2022-08-02 NOTE — PLAN OF CARE
Occupational Therapy: Orders received. Chart reviewed and discussed with care team and PT.? Occupational Therapy not indicated due to pt up IND since admission, reports no ADL/IADL concerns.? Defer discharge recommendations to care team.? Will complete orders.

## 2022-08-03 LAB
A PHAGOCYTOPH DNA BLD QL NAA+PROBE: DETECTED
E CHAFFEENSIS DNA BLD QL NAA+PROBE: NOT DETECTED
E EWINGII DNA SPEC QL NAA+PROBE: NOT DETECTED
EHRLICHIA DNA SPEC QL NAA+PROBE: NOT DETECTED

## 2022-08-04 LAB
B MICROTI DNA BLD QL NAA+PROBE: NOT DETECTED
BABESIA DNA BLD QL NAA+PROBE: NOT DETECTED
BACTERIA BLD CULT: NO GROWTH
BACTERIA BLD CULT: NO GROWTH

## 2022-08-06 LAB
BACTERIA BLD CULT: NO GROWTH
BACTERIA BLD CULT: NO GROWTH

## 2022-08-12 ENCOUNTER — OFFICE VISIT (OUTPATIENT)
Dept: FAMILY MEDICINE | Facility: CLINIC | Age: 82
End: 2022-08-12
Payer: COMMERCIAL

## 2022-08-12 VITALS
TEMPERATURE: 99.1 F | RESPIRATION RATE: 20 BRPM | BODY MASS INDEX: 22.03 KG/M2 | OXYGEN SATURATION: 97 % | HEART RATE: 76 BPM | SYSTOLIC BLOOD PRESSURE: 124 MMHG | DIASTOLIC BLOOD PRESSURE: 62 MMHG | WEIGHT: 116.6 LBS

## 2022-08-12 DIAGNOSIS — Z09 HOSPITAL DISCHARGE FOLLOW-UP: Primary | ICD-10-CM

## 2022-08-12 DIAGNOSIS — A77.49 ANAPLASMOSIS: ICD-10-CM

## 2022-08-12 DIAGNOSIS — D69.6 THROMBOCYTOPENIA (H): ICD-10-CM

## 2022-08-12 DIAGNOSIS — R74.8 ELEVATED LIVER ENZYMES: ICD-10-CM

## 2022-08-12 LAB
BASOPHILS # BLD AUTO: 0 10E3/UL (ref 0–0.2)
BASOPHILS NFR BLD AUTO: 0 %
EOSINOPHIL # BLD AUTO: 0.1 10E3/UL (ref 0–0.7)
EOSINOPHIL NFR BLD AUTO: 1 %
ERYTHROCYTE [DISTWIDTH] IN BLOOD BY AUTOMATED COUNT: 16.1 % (ref 10–15)
HCT VFR BLD AUTO: 36.1 % (ref 35–47)
HGB BLD-MCNC: 11.5 G/DL (ref 11.7–15.7)
LYMPHOCYTES # BLD AUTO: 2.8 10E3/UL (ref 0.8–5.3)
LYMPHOCYTES NFR BLD AUTO: 50 %
MCH RBC QN AUTO: 31.1 PG (ref 26.5–33)
MCHC RBC AUTO-ENTMCNC: 31.9 G/DL (ref 31.5–36.5)
MCV RBC AUTO: 98 FL (ref 78–100)
MONOCYTES # BLD AUTO: 0.7 10E3/UL (ref 0–1.3)
MONOCYTES NFR BLD AUTO: 12 %
NEUTROPHILS # BLD AUTO: 2.1 10E3/UL (ref 1.6–8.3)
NEUTROPHILS NFR BLD AUTO: 36 %
PLATELET # BLD AUTO: 466 10E3/UL (ref 150–450)
RBC # BLD AUTO: 3.7 10E6/UL (ref 3.8–5.2)
WBC # BLD AUTO: 5.7 10E3/UL (ref 4–11)

## 2022-08-12 PROCEDURE — 85025 COMPLETE CBC W/AUTO DIFF WBC: CPT | Performed by: FAMILY MEDICINE

## 2022-08-12 PROCEDURE — 99204 OFFICE O/P NEW MOD 45 MIN: CPT | Performed by: FAMILY MEDICINE

## 2022-08-12 PROCEDURE — 80053 COMPREHEN METABOLIC PANEL: CPT | Performed by: FAMILY MEDICINE

## 2022-08-12 PROCEDURE — 36415 COLL VENOUS BLD VENIPUNCTURE: CPT | Performed by: FAMILY MEDICINE

## 2022-08-12 ASSESSMENT — PAIN SCALES - GENERAL: PAINLEVEL: NO PAIN (0)

## 2022-08-12 NOTE — PROGRESS NOTES
Assessment & Plan     Hospital discharge follow-up  Patient was admitted to the hospital on July 30, 2022.  She was discharged on August 2, 2022.  She was admitted for fever, headache, muscle and joint pain fatigue, confusion.  Her work-up was positive for anaplasmosis.  She was treated, she was discharged home with 10 days of doxycycline which she has completed.  Today, she feels a lot better she is back to baseline.  She has no fever, no chills, no headache,  She has no muscle nor joint pain.  No nausea no vomiting.  No bleeding, no blood in urine or stool.    Reviewed her admitting and  discharge summary, reviewed her lab work, diagnostic work-up    Anaplasmosis  Completely resolved.    Elevated liver enzymes  Repeat today  - Comprehensive metabolic panel (BMP + Alb, Alk Phos, ALT, AST, Total. Bili, TP); Future  - CBC with platelets and differential; Future  - Comprehensive metabolic panel (BMP + Alb, Alk Phos, ALT, AST, Total. Bili, TP)  - CBC with platelets and differential    Thrombocytopenia (H)  Repeat CBC today.      No follow-ups on file.    Vazquez Dunbar MD  [facility]    Cinda De Los Santos is a 82 year old, presenting for the following health issues:  Hospital F/U      History of Present Illness       Reason for visit:  Tick bite infection    She eats 2-3 servings of fruits and vegetables daily.She consumes 1 sweetened beverage(s) daily.She exercises with enough effort to increase her heart rate 10 to 19 minutes per day.  She exercises with enough effort to increase her heart rate 4 days per week.   She is taking medications regularly.         Hospital Follow-up Visit:    Hospital/Nursing Home/IP Rehab Facility: [facility]  Date of Admission: 07/30/22  Date of Discharge: 08/02/22  Reason(s) for Admission: Hypothyroidism and Lymphopenia    Was your hospitalization related to COVID-19? No   Problems taking medications regularly:   None  Medication changes since discharge: None  Problems adhering to non-medication therapy:  None    Summary of hospitalization:  [facility] discharge summary reviewed  Diagnostic Tests/Treatments reviewed.  Follow up needed: none  Other Healthcare Providers Involved in Patient s Care:         None  Update since discharge: improved. Post Medication Reconciliation Status:        Plan of care communicated with patient     Review of Systems   Constitutional, HEENT, cardiovascular, pulmonary, GI, , musculoskeletal, neuro, skin, endocrine and psych systems are negative, except as otherwise noted.      Objective    Pulse 76   Temp 99.1  F (37.3  C) (Oral)   Resp 20   Wt 52.9 kg (116 lb 9.6 oz)   SpO2 97%   Breastfeeding No   BMI 22.03 kg/m    Body mass index is 22.03 kg/m .  Physical Exam   GENERAL: healthy, alert and no distress  HENT: ear canals and TM's normal, nose and mouth without ulcers or lesions  NECK: no adenopathy, no asymmetry, masses, or scars and thyroid normal to palpation  RESP: lungs clear to auscultation - no rales, rhonchi or wheezes  CV: regular rate and rhythm, normal S1 S2, no S3 or S4, no murmur, click or rub, no peripheral edema and peripheral pulses strong  ABDOMEN: soft, nontender, no hepatosplenomegaly, no masses and bowel sounds normal  MS: no gross musculoskeletal defects noted, no edema  SKIN: no suspicious lesions or rashes  NEURO: Normal strength and tone, mentation intact and speech normal  BACK: no CVA tenderness, no paralumbar tenderness    Orders Placed This Encounter   Procedures     REVIEW OF HEALTH MAINTENANCE PROTOCOL ORDERS     Comprehensive metabolic panel (BMP + Alb, Alk Phos, ALT, AST, Total. Bili, TP)     CBC with platelets and differential     CBC with platelets and differential       Vazquez Dunbar MD            .  ..

## 2022-08-13 LAB
ALBUMIN SERPL-MCNC: 3.3 G/DL (ref 3.4–5)
ALP SERPL-CCNC: 93 U/L (ref 40–150)
ALT SERPL W P-5'-P-CCNC: 35 U/L (ref 0–50)
ANION GAP SERPL CALCULATED.3IONS-SCNC: 5 MMOL/L (ref 3–14)
AST SERPL W P-5'-P-CCNC: 23 U/L (ref 0–45)
BILIRUB SERPL-MCNC: 1 MG/DL (ref 0.2–1.3)
BUN SERPL-MCNC: 19 MG/DL (ref 7–30)
CALCIUM SERPL-MCNC: 9 MG/DL (ref 8.5–10.1)
CHLORIDE BLD-SCNC: 105 MMOL/L (ref 94–109)
CO2 SERPL-SCNC: 27 MMOL/L (ref 20–32)
CREAT SERPL-MCNC: 0.85 MG/DL (ref 0.52–1.04)
GFR SERPL CREATININE-BSD FRML MDRD: 68 ML/MIN/1.73M2
GLUCOSE BLD-MCNC: 88 MG/DL (ref 70–99)
POTASSIUM BLD-SCNC: 4.5 MMOL/L (ref 3.4–5.3)
PROT SERPL-MCNC: 7.1 G/DL (ref 6.8–8.8)
SODIUM SERPL-SCNC: 137 MMOL/L (ref 133–144)

## 2022-08-23 NOTE — NURSING NOTE
Chief Complaints and History of Present Illnesses   Patient presents with     Follow Up     8 month follow up s/p DSAEK, ACIOL explant, scleral fixated IOL, EDTA chelation left eye 6/25/19     Chief Complaint(s) and History of Present Illness(es)     Follow Up     Comments: 8 month follow up s/p DSAEK, ACIOL explant, scleral fixated IOL, EDTA chelation left eye 6/25/19              Comments     Pt states vision is about the same as last visit.   No eye pain today. No redness or dryness.  No flashes or floaters.    TUSHAR Thomas Iesha 15, 2020 7:34 AM                     30

## 2022-08-30 ENCOUNTER — TELEPHONE (OUTPATIENT)
Dept: OPTOMETRY | Facility: CLINIC | Age: 82
End: 2022-08-30

## 2022-08-30 ENCOUNTER — OFFICE VISIT (OUTPATIENT)
Dept: OPTOMETRY | Facility: CLINIC | Age: 82
End: 2022-08-30

## 2022-08-30 DIAGNOSIS — Z94.7 POST CORNEAL TRANSPLANT: ICD-10-CM

## 2022-08-30 DIAGNOSIS — H52.32 ANISOMETROPIA AND ANISEIKONIA: Primary | ICD-10-CM

## 2022-08-30 DIAGNOSIS — H52.31 ANISOMETROPIA AND ANISEIKONIA: Primary | ICD-10-CM

## 2022-08-30 ASSESSMENT — REFRACTION_CURRENTRX
OD_SPHERE: -6.25
OS_BASECURVE: 7.94
OD_BASECURVE: 7.94
OD_BRAND: BLANCHARD GP
OD_DIAMETER: 9.5
OS_DIAMETER: 9.5
OS_ADDL_SPECS: GREEN
OS_SPHERE: +1.50
OS_BRAND: BLANCHARD GP
OD_ADDL_SPECS: BLUE

## 2022-08-30 NOTE — PROGRESS NOTES
No office visit. CL order only. Most recent right lens broke, she has gone back to using older smaller diam lens which is easier to remove. Will trial smaller diam lens again - pt will let us know if preferring larger diam    Contact Lens Billing  V-Code:  - GP Spherical  Final Contact Lens Rx       Brand Base Curve Diameter Sphere Lens Addl. Specs    Right Azra GP 7.85 8.5 -6.50 [place] ES BLUE BLUE    Left Azra GP 7.94 9.5 +1.50 [place]xton ES GREEN GREEN         # of units: 1 right lens  Price per Unit: $85    This patient requires contact lenses that are medically necessary for either improvement in vision over spectacles, support of the ocular surface, or other therapeutic benefit. These are not cosmetic contact lenses.     Encounter Diagnoses   Name Primary?     Anisometropia and aniseikonia Yes     Post corneal transplant         Date of last eye exam: 4/27/22

## 2022-11-19 ENCOUNTER — HEALTH MAINTENANCE LETTER (OUTPATIENT)
Age: 82
End: 2022-11-19

## 2022-12-13 ENCOUNTER — OFFICE VISIT (OUTPATIENT)
Dept: OPTOMETRY | Facility: CLINIC | Age: 82
End: 2022-12-13
Payer: COMMERCIAL

## 2022-12-13 DIAGNOSIS — H52.32 ANISOMETROPIA AND ANISEIKONIA: ICD-10-CM

## 2022-12-13 DIAGNOSIS — H25.11 AGE-RELATED NUCLEAR CATARACT, RIGHT EYE: Primary | ICD-10-CM

## 2022-12-13 DIAGNOSIS — H52.31 ANISOMETROPIA AND ANISEIKONIA: ICD-10-CM

## 2022-12-13 DIAGNOSIS — Z94.7 POST CORNEAL TRANSPLANT: ICD-10-CM

## 2022-12-13 ASSESSMENT — REFRACTION_WEARINGRX
OD_ADD: +2.50
OD_SPHERE: -7.00
OD_CYLINDER: +1.00
OS_ADD: +2.50
OS_CYLINDER: +0.50
OD_AXIS: 085
OS_SPHERE: -6.00
OS_AXIS: 135

## 2022-12-13 ASSESSMENT — REFRACTION_CURRENTRX
OS_BASECURVE: 7.94
OD_BRAND: BLANCHARD GP
OS_ADDL_SPECS: GREEN
OD_BASECURVE: 7.85
OD_SPHERE: -6.50
OD_ADDL_SPECS: BLUE
OS_BRAND: BLANCHARD GP
OS_DIAMETER: 9.5
OD_DIAMETER: 8.5
OS_SPHERE: +1.50

## 2022-12-13 ASSESSMENT — VISUAL ACUITY
OS_SC: 20/70
CORRECTION_TYPE: CONTACTS
OD_CC+: -2
METHOD: SNELLEN - LINEAR
OD_CC: 20/30

## 2022-12-13 ASSESSMENT — REFRACTION_MANIFEST
OD_SPHERE: -7.75
OD_AXIS: 035
OD_CYLINDER: +1.00

## 2022-12-13 ASSESSMENT — TONOMETRY
OS_IOP_MMHG: 21
OD_IOP_MMHG: 18
IOP_METHOD: ICARE

## 2022-12-13 ASSESSMENT — SLIT LAMP EXAM - LIDS
COMMENTS: NORMAL
COMMENTS: NORMAL

## 2022-12-13 ASSESSMENT — EXTERNAL EXAM - RIGHT EYE: OD_EXAM: NORMAL

## 2022-12-13 ASSESSMENT — EXTERNAL EXAM - LEFT EYE: OS_EXAM: NORMAL

## 2022-12-13 NOTE — PROGRESS NOTES
"A/P  1.) Cataract right eye  -Right eye with decreasing acuity, more difficulty seeing distance. Myopic shift 2' to cataract but still only improving to 20/30  -Undilated view today, but likely ready for cataract surgery right eye. Will update RGP for now until seeing Dr. Rudd in February. Can consider glasses but would rec holding if planning on CE/IOL    2.) Irregular astigmatism left eye  -s/p IOL and DSAEK left eye  -Left eye does not improve significantly with RGP. Does not tolerate wearing one as left eye will try to \"take over\". Happened in both monovision and distance. Would not recommend left eye correction at this time    Order new RGP and mail direct. Rec eval for CE/IOL right eye with Dr. Rudd in [facility] appt    I have confirmed the patient's CC, HPI and reviewed Past Medical History, Past Surgical History, Social History, Family History, Problem List, Medication List and agree with Tech note.     Geeta Coker, OD [facility]    "

## 2023-01-21 DIAGNOSIS — H40.052 BORDERLINE GLAUCOMA OF LEFT EYE WITH OCULAR HYPERTENSION: ICD-10-CM

## 2023-01-25 RX ORDER — LATANOPROST 50 UG/ML
1 SOLUTION/ DROPS OPHTHALMIC AT BEDTIME
Qty: 2.5 ML | Refills: 2 | Status: SHIPPED | OUTPATIENT
Start: 2023-01-25 | End: 2023-08-10

## 2023-02-08 DIAGNOSIS — H43.393 VITREOUS SYNERESIS OF BOTH EYES: ICD-10-CM

## 2023-02-08 DIAGNOSIS — H40.052 BORDERLINE GLAUCOMA OF LEFT EYE WITH OCULAR HYPERTENSION: Primary | ICD-10-CM

## 2023-02-27 ENCOUNTER — OFFICE VISIT (OUTPATIENT)
Dept: OPHTHALMOLOGY | Facility: CLINIC | Age: 83
End: 2023-02-27
Attending: OPHTHALMOLOGY
Payer: COMMERCIAL

## 2023-02-27 DIAGNOSIS — H35.373 EPIRETINAL MEMBRANE (ERM) OF BOTH EYES: ICD-10-CM

## 2023-02-27 DIAGNOSIS — Z94.7 POST CORNEAL TRANSPLANT: ICD-10-CM

## 2023-02-27 DIAGNOSIS — H18.12 BULLOUS KERATOPATHY OF LEFT EYE: Primary | ICD-10-CM

## 2023-02-27 DIAGNOSIS — H43.393 VITREOUS SYNERESIS OF BOTH EYES: ICD-10-CM

## 2023-02-27 DIAGNOSIS — H35.353 CYSTOID MACULAR DEGENERATION OF BOTH EYES: ICD-10-CM

## 2023-02-27 DIAGNOSIS — H40.002 GLAUCOMA SUSPECT OF LEFT EYE: Primary | ICD-10-CM

## 2023-02-27 PROCEDURE — 999N000103 HC STATISTIC NO CHARGE FACILITY FEE

## 2023-02-27 PROCEDURE — 99207 OCT OPTIC NERVE RNFL SPECTRALIS OU (BOTH EYES): CPT | Mod: 26 | Performed by: OPHTHALMOLOGY

## 2023-02-27 PROCEDURE — G0463 HOSPITAL OUTPT CLINIC VISIT: HCPCS | Mod: 25

## 2023-02-27 PROCEDURE — 76514 ECHO EXAM OF EYE THICKNESS: CPT | Performed by: OPHTHALMOLOGY

## 2023-02-27 PROCEDURE — 92134 CPTRZ OPH DX IMG PST SGM RTA: CPT | Performed by: OPHTHALMOLOGY

## 2023-02-27 PROCEDURE — 99214 OFFICE O/P EST MOD 30 MIN: CPT | Mod: 25 | Performed by: OPHTHALMOLOGY

## 2023-02-27 PROCEDURE — 92133 CPTRZD OPH DX IMG PST SGM ON: CPT | Performed by: OPHTHALMOLOGY

## 2023-02-27 PROCEDURE — 99213 OFFICE O/P EST LOW 20 MIN: CPT | Mod: 25 | Performed by: OPHTHALMOLOGY

## 2023-02-27 RX ORDER — OFLOXACIN 3 MG/ML
1 SOLUTION/ DROPS OPHTHALMIC 4 TIMES DAILY
Qty: 5 ML | Refills: 0 | Status: SHIPPED | OUTPATIENT
Start: 2023-02-27 | End: 2023-03-02

## 2023-02-27 ASSESSMENT — EXTERNAL EXAM - LEFT EYE
OS_EXAM: NORMAL
OS_EXAM: NORMAL

## 2023-02-27 ASSESSMENT — TONOMETRY
IOP_METHOD: ICARE
OD_IOP_MMHG: RGP
OS_IOP_MMHG: 16
IOP_METHOD: ICARE
OS_IOP_MMHG: 16
OD_IOP_MMHG: RGP

## 2023-02-27 ASSESSMENT — CONF VISUAL FIELD
OD_SUPERIOR_TEMPORAL_RESTRICTION: 0
OD_INFERIOR_NASAL_RESTRICTION: 0
OS_INFERIOR_TEMPORAL_RESTRICTION: 0
OS_INFERIOR_NASAL_RESTRICTION: 0
OD_INFERIOR_TEMPORAL_RESTRICTION: 0
OD_SUPERIOR_TEMPORAL_RESTRICTION: 0
OS_NORMAL: 1
OD_NORMAL: 1
OS_INFERIOR_TEMPORAL_RESTRICTION: 0
OD_INFERIOR_NASAL_RESTRICTION: 0
OS_INFERIOR_NASAL_RESTRICTION: 0
OD_SUPERIOR_NASAL_RESTRICTION: 0
OD_INFERIOR_TEMPORAL_RESTRICTION: 0
METHOD: COUNTING FINGERS
OS_NORMAL: 1
OS_SUPERIOR_TEMPORAL_RESTRICTION: 0
OS_SUPERIOR_NASAL_RESTRICTION: 0
OD_NORMAL: 1
OS_SUPERIOR_NASAL_RESTRICTION: 0
OS_SUPERIOR_TEMPORAL_RESTRICTION: 0
METHOD: COUNTING FINGERS
OD_SUPERIOR_NASAL_RESTRICTION: 0

## 2023-02-27 ASSESSMENT — PACHYMETRY
EXAM_DATE: 2/27/2023
OS_CT(UM): 709

## 2023-02-27 ASSESSMENT — SLIT LAMP EXAM - LIDS
COMMENTS: NORMAL

## 2023-02-27 ASSESSMENT — VISUAL ACUITY
OS_PH_SC: 20/50
OS_PH_SC+: -1
OS_SC+: -2
METHOD: SNELLEN - LINEAR
OD_CC: 20/30
CORRECTION_TYPE: CONTACTS
OD_CC+: -1
OS_PH_SC: 20/50
OS_PH_SC+: -1
CORRECTION_TYPE: CONTACTS
OD_CC+: -1
OS_SC: 20/60
OD_CC: 20/30
OS_SC: 20/60
METHOD: SNELLEN - LINEAR
OS_SC+: -2

## 2023-02-27 ASSESSMENT — CUP TO DISC RATIO
OS_RATIO: 0.5
OD_RATIO: 0.2

## 2023-02-27 ASSESSMENT — EXTERNAL EXAM - RIGHT EYE
OD_EXAM: NORMAL
OD_EXAM: NORMAL

## 2023-02-27 NOTE — NURSING NOTE
Chief Complaints and History of Present Illnesses   Patient presents with     Follow Up     Bullous keratopathy of left eye, cataract right eye     Chief Complaint(s) and History of Present Illness(es)     Follow Up            Laterality: both eyes    Context: night driving    Course: stable    Associated symptoms: glare (stable) and photophobia.  Negative for dryness, eye pain, flashes and floaters    Treatments tried: eye drops    Pain scale: 0/10    Comments: Bullous keratopathy of left eye, cataract right eye          Comments    She tells me that her vision in the right eye is good.  She has been bothered by her right upper upper lid which does not open easily in the mornings.  She uses her finger to open her right eye and then it seems fine for the rest of the day.  Her left eye vision seems stable.    She has some decreased contrast vision and glare at night.  She is not interested in cataract surgery for her right eye at this time.    She is very compliant in using:  Prednisolone BID to left eye    Latanoprost at bedtime to left eye       Teresa Prasad, COT 8:13 AM  February 27, 2023

## 2023-02-27 NOTE — PROGRESS NOTES
"CC: Failed DSAEK OS/ ACIOL OS - s/p repeat DSAEK OS    HPI: Magali Macdonald is a 82 year old female s/p DSEK OS 5/9/18. H/o retinal detachment with repair and lensectomy with Dr. Maldonado. Patient had secondary ACIOL OS with Dr. Abraham. 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 Canchola who did a DSAEK OS. The patient then developed graft failure after 1 year, likely due to the ACIOL. Now s/p repeat DSAEK.    Interval Hx 2/27/23:   Patient reports that her vision feels stable in both eyes. Her right eye is \"shut\" in the mornings until she pushes it open with her fingers, and after this she has no eyelid drooping and the lid works normally. No eye pain, no discomfort. Patient still feels like the eyes sometimes don't come together. She has better peripheral vision in the left eye now. Patient feels like the eyes see different sizes, but that is a bit better.    Gtts:  Pred Acetate BID left eye  Latanoprost QHS OS  PFATs prn (states she is not using very frequently)    POHx:  DSAEK, ACIOL explant, scleral fixated IOL, EDTA chelation left eye 6/25/19  DSEK left eye 5/9/2018 (Dr. Canchola)  ACIOL OS Dr. Abraham 10/15/14  Pars plana vitrectomy (PPV)/SBP/PPL/FGx OS 10/3/13 for recurrent RRD and cataract  Pars plana vitrectomy OS  (PPV)/FGx 6/2013 by Oli    Assessment:    # S/p DSAEK, ACIOL explant, scleral fixated IOL, EDTA chelation left eye 6/25/19  Left eye with loose suture superiorly in conj today, removed.     PLAN:   - Remove 2 broken superior sutures - 2/27/23    - Start 3 day course of ofloxacin QID left eye following suture removal.    - continue prednisolone acetate to BID OS    - continue latanoprost QHS OS   - consider increase preservative free artificial tears to using at least four times daily (using rarely currently) if symptoms worsen   - pachy overall stable (702-> 709 today)   - VA stable, monitor   - okay to try RGP OS for monovision; patient also motivated to try " multifocal contact lenses - refer to Dr. Coker, patient has tried contact lenses in left eye without much improvement.    # Cataract OD    - discussed possible CE/IOL OD   - would try to optimize CME OD, patient may benefit from ERM peel with Dr. Maldonado at the same time   - Patient would like to defer for now, will discuss with retina.    # H/o CME OS   - History of chronic cystoid macular edema, absent at last retina appointment (7/2021)   - Follow up with Dr. Maldonado today.     # ERM each eye   - Given symptoms of distortion and difference in image sizes   - Noted to be stable at last retina appointment    - Follow up with Dr. Maldonado today.     # Pseudophakia left eye   - Recommend trial of monovision with Dr. Coker (target near OS)    # NS right eye   - 20/30; pt not bothered by changes in vision   - Satisfied with RGP    # ON drusen    Follow up: 6 months Tobin w/ VT + Pachy LE, earlier if interested in surgery, call if problems sooner to clinic.    ALSO SEES:  Dr Joel Galindo MD  Ophthalmology, PGY-3    Attending Physician Attestation:  Complete documentation of historical and exam elements from today's encounter can be found in the full encounter summary report (not reduplicated in this progress note).  I personally obtained the chief complaint(s) and history of present illness.  I confirmed and edited as necessary the review of systems, past medical/surgical history, family history, social history, and examination findings as documented by others; and I examined the patient myself.  I personally reviewed the relevant tests, images, and reports as documented above.  I formulated and edited as necessary the assessment and plan and discussed the findings and management plan with the patient and family. - Moises Rudd,  MD    --------------------------------------------------------------------------------------------------------------------------------------------  Pachymetry - Interpretation & Report  Indication: post corneal transplant OS, r/o rejection/bullous keratopathy OS  Performed by: Moises Rudd MD  Reliability: good  Patient cooperation: good  Findings:   Left eye:  709 micrometers centrally   Interval Change, Assessment, & Impact on treatment:   Left eye:  Stable, thin   Signed: Moises Rudd MD 2/27/2023 9:32 AM      I personally spent great than 40minutes spent on the date of the encounter doing chart review, history and exam, discussion with the patient, documentation, and further activities as noted above. We discussed the complexity of their diagnosis, the need for further information, close monitoring, continued management, and the unknown prognosis for the patient at this time.    Moises Rudd MD

## 2023-02-27 NOTE — PROGRESS NOTES
CC: CME after RD OS    INTERVAL HISTORY -  Happy with VA OD, wishes to avoid surgery  SANDRA with me 2021  OK with ADLs, no distortion noticed  feels OS improved lately      PMH:   82 year old here for follow up for h/o Retinal detachment left eye c/b CME. Post op course included placement of ACIOL which likely led to PBK requiring DSAEK. After failure of the first DSAEK, a second was performed along with explantation of the ACIOL and placement of a secondary IOL (Yamane technique).     PAST OCULAR SURGERY  PPV/SBP/PPL/FGX OS 10/3/13 (DDK)   PPV/FGx OS 6/2013 (Oli)  ACIOL OS 10/15/14 (Abraham)  DSAEK OS 2/2 PBK 5/9/18 (Tobin)  DSAEK #2+ACIOL explant + scleral fixated IOL + chelation O.S 6/25/2019 ([facility])      RETINAL IMAGING  OCT 02/27/23   OD- stable mod ERM with traction and distortion of fovea  OS - mild ERM  with preservation of foveal depression, no CME, joselo-foveal atrophy of the IS/OS junction     OCT RNFL 2/27/23  OD - inferior sector abnormal  OS - multiple abnormal sectors    ASSESSMENT & PLAN    #.  H/o RD repair OS   - surgery x 2 in 2013   - retina flat   - VA 20/30 in 2015      #.  Epiretinal membrane OD   - minimal visual symptoms, micropsia   - VA right eye 20/25   - observe      #. ERM OS   - mild not significant likely    - VA was 20/30 in 2015   - prior RD likely limiting vision   - has image minification but doubt benefit from surgery   - observe      #. 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  I personally obtained the chief complaint(s) and history of present illness.  I confirmed and edited as necessary the review of systems, past medical/surgical history, family history, social history, and examination findings as documented by others; and I examined the patient myself.  I personally reviewed the relevant tests, images, and reports as documented above.  I formulated and edited as necessary the assessment and plan and discussed the findings and management plan with the patient and family    Kandi Maldonado MD, PhD  , Vitreoretinal Surgery  Department of Ophthalmology  [facility]

## 2023-02-27 NOTE — NURSING NOTE
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  She has been bothered by her right upper upper lid which does not open easily in the mornings.  She uses her finger to open her right eye and then it seems fine for the rest of the day.  Her left eye vision seems stable.    She has some decreased contrast vision and glare at night.  She is not interested in cataract surgery for her right eye at this time.    She is very compliant in using:  Prednisolone BID to left eye    Latanoprost at bedtime to left eye       Teresa Prasad, COT 8:13 AM  February 27, 2023 rednisolone BID to left eye

## 2023-04-25 DIAGNOSIS — Z94.7 POST CORNEAL TRANSPLANT: ICD-10-CM

## 2023-04-25 NOTE — TELEPHONE ENCOUNTER
prednisoLONE acetate (PRED FORTE) 1 % ophthalmic suspension      Last Written Prescription Date:  03-  Last Fill Quantity: 10,   # refills: 5  Last Office Visit : 2-  Future Office visit:  Not on file

## 2023-04-26 RX ORDER — PREDNISOLONE ACETATE 10 MG/ML
1 SUSPENSION/ DROPS OPHTHALMIC 2 TIMES DAILY
Qty: 10 ML | Refills: 1 | Status: SHIPPED | OUTPATIENT
Start: 2023-04-26 | End: 2023-10-16

## 2023-07-01 ENCOUNTER — HEALTH MAINTENANCE LETTER (OUTPATIENT)
Age: 83
End: 2023-07-01

## 2023-08-10 DIAGNOSIS — H40.052 BORDERLINE GLAUCOMA OF LEFT EYE WITH OCULAR HYPERTENSION: ICD-10-CM

## 2023-08-10 RX ORDER — LATANOPROST 50 UG/ML
1 SOLUTION/ DROPS OPHTHALMIC AT BEDTIME
Qty: 2.5 ML | Refills: 2 | Status: SHIPPED | OUTPATIENT
Start: 2023-08-10 | End: 2023-12-21

## 2023-08-10 NOTE — TELEPHONE ENCOUNTER
latanoprost (XALATAN) 0.005 % ophthalmic solution   2.5 mL 2 1/25/2023     Last Office Visit : 2-  Future Office visit:  none

## 2023-10-16 DIAGNOSIS — Z94.7 POST CORNEAL TRANSPLANT: ICD-10-CM

## 2023-10-16 NOTE — TELEPHONE ENCOUNTER
[facility] Call Center    Phone Message    May a detailed message be left on voicemail: yes     Reason for Call: Medication Refill Request    Has the patient contacted the pharmacy for the refill? Yes   Name of medication being requested: prednisoLONE acetate (PRED FORTE) 1 % ophthalmic suspension   Provider who prescribed the medication: Dr. Rudd  Pharmacy:   [facility] [address] [place] [id number]  Phone: [phone] Fax: [phone]     Date medication is needed: ASAP     Action Taken: Other: eye    Travel Screening: Not Applicable

## 2023-10-16 NOTE — TELEPHONE ENCOUNTER
Medication: prednisoLONE acetate (PRED FORTE) 1 % ophthalmic suspension    Requested directions: pt call  Current directions on the medication list: Place 1 drop Into the left eye 2 times daily - Left Eye      Last Written Prescription Date:4-26-23  Last Fill Quantity: 10ml,   # refills: 1    Last Office Visit:2-26-23 ( RTC 6M)  Future Office visit: none    Attending Provider: Tobin  Last Clinic Note: - continue prednisolone acetate to BID OS     Routing refill request to provider for review/approval because:    Not on protocol  Requires provider review

## 2023-10-17 RX ORDER — PREDNISOLONE ACETATE 10 MG/ML
1 SUSPENSION/ DROPS OPHTHALMIC 2 TIMES DAILY
Qty: 10 ML | Refills: 0 | Status: SHIPPED | OUTPATIENT
Start: 2023-10-17 | End: 2023-12-21

## 2023-12-21 ENCOUNTER — TELEPHONE (OUTPATIENT)
Dept: OPHTHALMOLOGY | Facility: CLINIC | Age: 83
End: 2023-12-21
Payer: COMMERCIAL

## 2023-12-21 DIAGNOSIS — Z94.7 POST CORNEAL TRANSPLANT: ICD-10-CM

## 2023-12-21 DIAGNOSIS — H40.052 BORDERLINE GLAUCOMA OF LEFT EYE WITH OCULAR HYPERTENSION: ICD-10-CM

## 2023-12-21 RX ORDER — PREDNISOLONE ACETATE 10 MG/ML
1 SUSPENSION/ DROPS OPHTHALMIC 2 TIMES DAILY
Qty: 10 ML | Refills: 2 | Status: SHIPPED | OUTPATIENT
Start: 2023-12-21 | End: 2024-08-15

## 2023-12-21 RX ORDER — LATANOPROST 50 UG/ML
1 SOLUTION/ DROPS OPHTHALMIC AT BEDTIME
Qty: 2.5 ML | Refills: 5 | Status: SHIPPED | OUTPATIENT
Start: 2023-12-21

## 2023-12-21 NOTE — TELEPHONE ENCOUNTER
[facility] Call Center    Phone Message    May a detailed message be left on voicemail: yes     Reason for Call: Medication Question or concern regarding medication   Prescription Clarification  Name of Medication: prednisoLONE acetate   Prescribing Provider: Dr. Rudd    Pharmacy:   [facility] [address] [place] [address]      What on the order needs clarification?   Patient is looking to get a temporary refill of the eye drops to carry her over to the appointment with Dr. Maldonado in March.  Patient is available to answer any additional questions if needed at [phone].       Action Taken: Message routed to:  [facility] Eye    Travel Screening: Not Applicable

## 2023-12-21 NOTE — TELEPHONE ENCOUNTER
Spoke to pt at 1348    Scheduled first available with Dr. Rudd on February 26th (pt due for follow up) and move march visit with Dr. Maldonado to same day per pt request.    Rx for prednisolone eye drop and latanoprost eye drop sent to pt's pharmacy.    Pt's eye stable per pt--no new vision changes/eye symptoms.    Jason Ortiz RN 1:51 PM 12/21/23

## 2024-02-12 DIAGNOSIS — H43.393 VITREOUS SYNERESIS OF BOTH EYES: Primary | ICD-10-CM

## 2024-02-26 ENCOUNTER — OFFICE VISIT (OUTPATIENT)
Dept: OPHTHALMOLOGY | Facility: CLINIC | Age: 84
End: 2024-02-26
Attending: OPHTHALMOLOGY
Payer: COMMERCIAL

## 2024-02-26 DIAGNOSIS — H35.052 CHOROIDAL NEOVASCULARIZATION OF LEFT EYE: ICD-10-CM

## 2024-02-26 DIAGNOSIS — H18.12 BULLOUS KERATOPATHY OF LEFT EYE: Primary | ICD-10-CM

## 2024-02-26 DIAGNOSIS — H43.393 VITREOUS SYNERESIS OF BOTH EYES: ICD-10-CM

## 2024-02-26 DIAGNOSIS — H35.373 EPIRETINAL MEMBRANE (ERM) OF BOTH EYES: ICD-10-CM

## 2024-02-26 DIAGNOSIS — H40.003 GLAUCOMA SUSPECT OF BOTH EYES: ICD-10-CM

## 2024-02-26 DIAGNOSIS — H43.811 VITREORETINAL DEGENERATION OF RIGHT EYE: Primary | ICD-10-CM

## 2024-02-26 PROCEDURE — 99214 OFFICE O/P EST MOD 30 MIN: CPT | Mod: GC | Performed by: OPHTHALMOLOGY

## 2024-02-26 PROCEDURE — 76514 ECHO EXAM OF EYE THICKNESS: CPT | Performed by: OPHTHALMOLOGY

## 2024-02-26 PROCEDURE — 92134 CPTRZ OPH DX IMG PST SGM RTA: CPT | Mod: 26 | Performed by: OPHTHALMOLOGY

## 2024-02-26 PROCEDURE — 92250 FUNDUS PHOTOGRAPHY W/I&R: CPT | Performed by: OPHTHALMOLOGY

## 2024-02-26 PROCEDURE — 99207 FUNDUS PHOTOS OU (BOTH EYES): CPT | Mod: 26 | Performed by: OPHTHALMOLOGY

## 2024-02-26 PROCEDURE — 99214 OFFICE O/P EST MOD 30 MIN: CPT | Mod: 25 | Performed by: OPHTHALMOLOGY

## 2024-02-26 PROCEDURE — 92134 CPTRZ OPH DX IMG PST SGM RTA: CPT | Performed by: OPHTHALMOLOGY

## 2024-02-26 PROCEDURE — 999N000103 HC STATISTIC NO CHARGE FACILITY FEE

## 2024-02-26 PROCEDURE — G0463 HOSPITAL OUTPT CLINIC VISIT: HCPCS | Performed by: OPHTHALMOLOGY

## 2024-02-26 PROCEDURE — G0463 HOSPITAL OUTPT CLINIC VISIT: HCPCS | Mod: 27 | Performed by: OPHTHALMOLOGY

## 2024-02-26 ASSESSMENT — VISUAL ACUITY
OS_SC+: -1
OD_CC: 20/40
CORRECTION_TYPE: CONTACTS
METHOD: SNELLEN - LINEAR
OD_CC+: +2
OD_CC+: +2
OS_SC+: -1
OS_PH_SC+: -2
OS_SC: 20/80
OS_SC: 20/80
OS_PH_SC: 20/70
OD_CC: 20/40
METHOD: SNELLEN - LINEAR
OS_PH_SC+: -2
OS_PH_SC: 20/70
CORRECTION_TYPE: CONTACTS

## 2024-02-26 ASSESSMENT — TONOMETRY
OS_IOP_MMHG: 15
OS_IOP_MMHG: 15
OD_IOP_MMHG: 19
IOP_METHOD: TONOPEN
OD_IOP_MMHG: 19
IOP_METHOD: ICARE

## 2024-02-26 ASSESSMENT — CONF VISUAL FIELD
OD_SUPERIOR_TEMPORAL_RESTRICTION: 0
OD_INFERIOR_NASAL_RESTRICTION: 0
OS_INFERIOR_TEMPORAL_RESTRICTION: 0
OD_NORMAL: 1
OS_INFERIOR_NASAL_RESTRICTION: 0
OD_NORMAL: 1
OD_SUPERIOR_NASAL_RESTRICTION: 0
METHOD: COUNTING FINGERS
METHOD: COUNTING FINGERS
OS_INFERIOR_TEMPORAL_RESTRICTION: 0
OS_INFERIOR_NASAL_RESTRICTION: 0
OD_INFERIOR_TEMPORAL_RESTRICTION: 0
OS_SUPERIOR_NASAL_RESTRICTION: 0
OD_INFERIOR_NASAL_RESTRICTION: 0
OS_SUPERIOR_NASAL_RESTRICTION: 0
OD_INFERIOR_TEMPORAL_RESTRICTION: 0
OS_NORMAL: 1
OD_SUPERIOR_NASAL_RESTRICTION: 0
OS_NORMAL: 1
OS_SUPERIOR_TEMPORAL_RESTRICTION: 0
OS_SUPERIOR_TEMPORAL_RESTRICTION: 0
OD_SUPERIOR_TEMPORAL_RESTRICTION: 0

## 2024-02-26 ASSESSMENT — EXTERNAL EXAM - RIGHT EYE
OD_EXAM: NORMAL
OD_EXAM: NORMAL

## 2024-02-26 ASSESSMENT — CUP TO DISC RATIO
OD_RATIO: 0.2
OS_RATIO: 0.5
OS_RATIO: 0.5
OD_RATIO: 0.2

## 2024-02-26 ASSESSMENT — SLIT LAMP EXAM - LIDS
COMMENTS: NORMAL

## 2024-02-26 ASSESSMENT — EXTERNAL EXAM - LEFT EYE
OS_EXAM: NORMAL
OS_EXAM: NORMAL

## 2024-02-26 ASSESSMENT — PACHYMETRY: OS_CT(UM): 666

## 2024-02-26 NOTE — NURSING NOTE
"Chief Complaints and History of Present Illnesses   Patient presents with    Cornea Transplant Follow Up     1 year follow up for DSAEK, ACIOL explant, scleral fixated IOL, EDTA chelation left eye.     Patient notes vision is more cloudy in left eye over the last year. Patient wears a contact lens in her right eye.      Chief Complaint(s) and History of Present Illness(es)       Cornea Transplant Follow Up              Laterality: left eye    Onset: years ago    Quality: blurred vision    Course: gradually worsening    Associated symptoms: photophobia (very).  Negative for dryness, eye pain, redness, tearing, flashes, floaters and itching    Treatments tried: eye drops    Pain scale: 0/10    Comments: 1 year follow up for DSAEK, ACIOL explant, scleral fixated IOL, EDTA chelation left eye.     Patient notes vision is more cloudy in left eye over the last year. Patient wears a contact lens in her right eye.               Comments    Ocular meds:   - Prednisolone BID left eye   - Latanoprost at bedtime left eye   - PFAT QID left eye, \"not using\"     Tawana Saleh, COT 8:13 AM 02/26/2024                     "

## 2024-02-26 NOTE — PROGRESS NOTES
CC: Failed DSAEK OS/ ACIOL OS - s/p repeat DSAEK OS    HPI: Magali Macdonald is a 83 year old female s/p DSEK OS 5/9/18. H/o retinal detachment with repair and lensectomy with Dr. Maldonado. Patient had secondary ACIOL OS with Dr. Abraham. 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 Canchola who did a DSAEK OS. The patient then developed graft failure after 1 year, likely due to the ACIOL. Now s/p repeat DSAEK.    Interval Hx 2/26/24:   One year follow up. Vision in right eye is stable. Wearing CL in the right eye. Vision in left eye seems to be worsening in the last year. Also seeing Dr. Maldonado today.    Gtts:  Pred Acetate BID left eye  Latanoprost QHS OS  PFATs prn (states she is not using very frequently)    POHx:  DSAEK, ACIOL explant, scleral fixated IOL, EDTA chelation left eye 6/25/19  DSEK left eye 5/9/2018 (Dr. Canchola)  ACIOL OS Dr. Abraham 10/15/14  Pars plana vitrectomy (PPV)/SBP/PPL/FGx OS 10/3/13 for recurrent RRD and cataract  Pars plana vitrectomy OS  (PPV)/FGx 6/2013 by Oli    Assessment:    # S/p DSAEK, ACIOL explant, scleral fixated IOL, EDTA chelation left eye 6/25/19  Left eye with loose suture superiorly in conj today, removed.     PLAN:   - continue prednisolone acetate to BID OS    - continue latanoprost QHS OS   - Continue preservative free artificial tears to using at least four times daily (using rarely currently) if symptoms worsen   - pachy overall stable (709 -> 666 today)   - VA stable, monitor   - Patient has RGP OS for monovision - but she prefers to leave it alone; doesn't feel like it improves her function as it is just peripheral vision that she gets out of the left eye.     # Cataract OD    - discussed possible CE/IOL OD again 2/26/24 - discussed progressive loss of vision and possible implications on her driving   - would try to optimize CME OD, patient may benefit from ERM peel with Dr. Maldonado at the same time   - Patient would like to  defer for now, will discuss with retina - she would consider surgery next year   - Patient seeing Dr. Maldonado today   - poor candidate for multifocal IOL given retinal pathology - would recommend monofocal lens and cheaters    # H/o CME OS   - History of chronic cystoid macular edema, absent at last retina appointment (7/2021)   - Follow up with Dr. Maldonado today.     # ERM each eye   - Given symptoms of distortion and difference in image sizes   - Noted to be stable at last retina appointment    - Follow up with Dr. Maldonado today.     # Pseudophakia left eye   - Recommend trial of monovision with Dr. Coker (target near OS)    # NS right eye   - 20/40; would like to hold off on cataract surgery but becoming slightly more symptomatic   - Satisfied with RGP    # ON drusen    Follow up: 6 months Tobin w/ VT + Pachy LE, earlier if interested in surgery, call if problems sooner to clinic.    ALSO SEES:  Dr Joel Mahajan MD  PGY3 Ophthalmology Resident  [facility]    Attending Physician Attestation:  Complete documentation of historical and exam elements from today's encounter can be found in the full encounter summary report (not reduplicated in this progress note).  I personally obtained the chief complaint(s) and history of present illness.  I confirmed and edited as necessary the review of systems, past medical/surgical history, family history, social history, and examination findings as documented by others; and I examined the patient myself.  I personally reviewed the relevant tests, images, and reports as documented above.  I formulated and edited as necessary the assessment and plan and discussed the findings and management plan with the patient and family. - Moises Rudd MD    --------------------------------------------------------------------------------------------------------------------------------------------  Pachymetry - Interpretation &  Report  Indication: post corneal transplant OS, r/o rejection/bullous keratopathy OS  Performed by: Moises Rudd MD  Reliability: good  Patient cooperation: good  Findings:   Left eye:  666 micrometers centrally   Interval Change, Assessment, & Impact on treatment:   Left eye:  improved, less thick   Signed: Moises Rudd MD 2/26/2024 10:32 AM

## 2024-02-26 NOTE — NURSING NOTE
"Chief Complaints and History of Present Illnesses   Patient presents with    Epiretinal Membrane Follow Up     1 year follow up for ERM each eye.     Patient notes vision is more cloudy in left eye over the last year. Patient wears a contact lens in her right eye.     Chief Complaint(s) and History of Present Illness(es)       Epiretinal Membrane Follow Up              Laterality: both eyes    Onset: years ago    Quality: blurred vision    Course: gradually worsening    Associated symptoms: photophobia (very).  Negative for dryness, eye pain, redness, tearing, flashes, floaters and itching    Treatments tried: eye drops    Pain scale: 0/10    Comments: 1 year follow up for ERM each eye.     Patient notes vision is more cloudy in left eye over the last year. Patient wears a contact lens in her right eye.              Comments    Ocular meds:   - Prednisolone BID left eye   - Latanoprost at bedtime left eye   - PFAT QID left eye, \"not using\"     Tawana Saleh, COT 8:13 AM 02/26/2024                      "

## 2024-02-26 NOTE — PROGRESS NOTES
CC: CME after RD OS    INTERVAL HISTORY -  VA OD has worsened since SANDRA, considering CE/IOL      PMH:   83 year old here for follow up for h/o Retinal detachment left eye c/b CME. Post op course included placement of ACIOL which likely led to PBK requiring DSAEK. After failure of the first DSAEK, a second was performed along with explantation of the ACIOL and placement of a secondary IOL (Yamane technique).     PAST OCULAR SURGERY  PPV/SBP/PPL/FGX OS 10/3/13 (DDK)   PPV/FGx OS 6/2013 (Oli)  ACIOL OS 10/15/14 (Abraham)  DSAEK OS 2/2 PBK 5/9/18 (Tobin)  DSAEK #2+ACIOL explant + scleral fixated IOL + chelation O.S 6/25/2019 ([facility])      RETINAL IMAGING  OCT 02/26/2024   OD- stable mod ERM with traction and distortion of fovea, PVD  OS - mild ERM  with preservation of foveal depression, no fluid, outer atrophy perifoveal, FVPED nasal macula no fluid    OCT RNFL 2/27/23  OD - inferior sector abnormal  OS - multiple abnormal sectors    ASSESSMENT & PLAN          #.  Epiretinal membrane OD   - minimal visual symptoms, micropsia   - VA right eye 20/25 in past     - worse today ?ERM vs NS   - reassess after CE/IOL      #. ERM OS   - mild not significant likely    - VA was 20/30 in 2015   - prior RD likely limiting vision   - has image minification but doubt benefit from surgery   - observe        # ?CNVM OS   - noted 2/2024   - not present 2/2023   - monitor/Amsler d/w patient 2/2024   - recheck 3-4 months as precaution    # OAG suspect OS > OD    - IOP OK   - ?sequelae from RD OS   - refer for eval 2//2024        #  PVD/syneresis OD   - S/Sx RD d/w patient 2/2024      #  NS OD   - suspect visually significant        # Subnormal VA OS   - was 20/30 in 2015 after RD repair   - ?ONH drusen/OAG   - ?cornea   - monitor       #   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  H/o RD repair OS   - surgery x 2 in 2013   -  retina flat   - VA 20/30 in 2015      #. H/o  CME OS   - primary etiology likely post- RD repair   - had tiny residual lens fragment noted in 2014 now likely absorbed   - outer atrophy OS macula likely d/t prior CME & prior RD   - CME absent now        Return in about 3 months (around 5/26/2024) for DFE OU, OCT OU.   ATTESTATION     Attending Physician Attestation:      Complete documentation of historical and exam elements from today's encounter can be found in the full encounter summary report (not reduplicated in this progress note).  I personally obtained the chief complaint(s) and history of present illness.  I confirmed and edited as necessary the review of systems, past medical/surgical history, family history, social history, and examination findings as documented by others; and I examined the patient myself.  I personally reviewed the relevant tests, images, and reports as documented above.  I formulated and edited as necessary the assessment and plan and discussed the findings and management plan with the patient and family    Kandi Maldonado MD, PhD  , Vitreoretinal Surgery  Department of Ophthalmology  [facility]

## 2024-08-14 DIAGNOSIS — Z94.7 POST CORNEAL TRANSPLANT: ICD-10-CM

## 2024-08-15 RX ORDER — PREDNISOLONE ACETATE 10 MG/ML
1 SUSPENSION/ DROPS OPHTHALMIC 2 TIMES DAILY
Qty: 10 ML | Refills: 0 | Status: SHIPPED | OUTPATIENT
Start: 2024-08-15

## 2024-08-15 NOTE — TELEPHONE ENCOUNTER
prednisoLONE Acetate Ophthalmic Suspension 1 %       Place 1 drop Into the left eye 2 times daily - Left Eye  Last Written Prescription Date:  12-21-23  Last Fill Quantity: 10 ml,   # refills: 2  Last Office Visit : 2-26-24  Future Office visit:  none    Last clinic note: Tobin  - continue prednisolone acetate to BID OS     Routing refill request to provider for review/approval because:  Not on eye protocol, needs provider review

## 2024-10-18 ENCOUNTER — TELEPHONE (OUTPATIENT)
Dept: OPHTHALMOLOGY | Facility: CLINIC | Age: 84
End: 2024-10-18
Payer: COMMERCIAL

## 2024-10-18 DIAGNOSIS — Z94.7 POST CORNEAL TRANSPLANT: ICD-10-CM

## 2024-10-18 RX ORDER — PREDNISOLONE ACETATE 10 MG/ML
1 SUSPENSION/ DROPS OPHTHALMIC 2 TIMES DAILY
Qty: 10 ML | Refills: 5 | Status: SHIPPED | OUTPATIENT
Start: 2024-10-18

## 2024-10-18 NOTE — TELEPHONE ENCOUNTER
[facility] Call Center    Phone Message    May a detailed message be left on voicemail: yes     Reason for Call: Other: Pt is needing her eye drop prescription refilled asap as she's going out of town. Please approve refill      Action Taken: Other: [facility]    Travel Screening: Not Applicable     Date of Service:

## 2024-10-18 NOTE — TELEPHONE ENCOUNTER
Spoke to pt at 1700    Rx for prednisolone eye drop sent per request to [facility] pharmacy.    Jason Ortiz RN 5:04 PM 10/18/24

## 2024-11-21 ENCOUNTER — TELEPHONE (OUTPATIENT)
Dept: OPHTHALMOLOGY | Facility: CLINIC | Age: 84
End: 2024-11-21
Payer: COMMERCIAL

## 2024-11-21 NOTE — TELEPHONE ENCOUNTER
[facility] Call Center    Phone Message    May a detailed message be left on voicemail: yes     Reason for Call: Other: Patient called requesting a call back from care team. She states her vision is decreasing especially at night. She was advised to call if she has any vision changes. She is also wanting to have cataract surgery done ASAP. She is wondering if she needs to see Dr. Rudd first before she scheduled the surgery. Please follow up with patient. Thanks.      Action Taken: Other: eye    Travel Screening: Not Applicable

## 2024-11-21 NOTE — TELEPHONE ENCOUNTER
Magali Macdonald [phone]      Called twice and left message with direct number at [phone]    Jason Ortiz RN 5:49 PM 11/21/24

## 2024-12-02 ENCOUNTER — OFFICE VISIT (OUTPATIENT)
Dept: OPHTHALMOLOGY | Facility: CLINIC | Age: 84
End: 2024-12-02
Attending: OPHTHALMOLOGY
Payer: COMMERCIAL

## 2024-12-02 DIAGNOSIS — Z94.7 POST CORNEAL TRANSPLANT: Primary | ICD-10-CM

## 2024-12-02 DIAGNOSIS — H35.373 EPIRETINAL MEMBRANE (ERM) OF BOTH EYES: ICD-10-CM

## 2024-12-02 DIAGNOSIS — H40.052 BORDERLINE GLAUCOMA OF LEFT EYE WITH OCULAR HYPERTENSION: ICD-10-CM

## 2024-12-02 DIAGNOSIS — H25.11 NUCLEAR SCLEROTIC CATARACT OF RIGHT EYE: ICD-10-CM

## 2024-12-02 PROCEDURE — 92025 CPTRIZED CORNEAL TOPOGRAPHY: CPT | Performed by: OPHTHALMOLOGY

## 2024-12-02 PROCEDURE — 76519 ECHO EXAM OF EYE: CPT | Performed by: OPHTHALMOLOGY

## 2024-12-02 RX ORDER — LATANOPROST 50 UG/ML
1 SOLUTION/ DROPS OPHTHALMIC AT BEDTIME
Qty: 2.5 ML | Refills: 5 | Status: SHIPPED | OUTPATIENT
Start: 2024-12-02

## 2024-12-02 RX ORDER — PREDNISOLONE ACETATE 10 MG/ML
1 SUSPENSION/ DROPS OPHTHALMIC 2 TIMES DAILY
Qty: 10 ML | Refills: 5 | Status: SHIPPED | OUTPATIENT
Start: 2024-12-02

## 2024-12-02 ASSESSMENT — VISUAL ACUITY
OS_PH_SC: 20/125
OD_CC: 20/40
CORRECTION_TYPE: CONTACTS
METHOD: SNELLEN - LINEAR
OD_BAT_LOW: 20/40
OD_BAT_HIGH: 20/300
OD_BAT_MED: 20/50
OS_SC: 20/200
OD_CC+: +2

## 2024-12-02 ASSESSMENT — CONF VISUAL FIELD
OD_NORMAL: 1
OD_SUPERIOR_TEMPORAL_RESTRICTION: 0
METHOD: COUNTING FINGERS
OD_SUPERIOR_NASAL_RESTRICTION: 0
OD_INFERIOR_TEMPORAL_RESTRICTION: 0
OD_INFERIOR_NASAL_RESTRICTION: 0
OS_INFERIOR_TEMPORAL_RESTRICTION: 1
OS_INFERIOR_NASAL_RESTRICTION: 3

## 2024-12-02 ASSESSMENT — TONOMETRY
OD_IOP_MMHG: 39
IOP_METHOD: ICARE
OD_IOP_MMHG: 38
IOP_METHOD: GAT
IOP_METHOD: ICARE
OS_IOP_MMHG: 21
OD_IOP_MMHG: 16
OS_IOP_MMHG: 19
OS_IOP_MMHG: 20

## 2024-12-02 ASSESSMENT — PACHYMETRY: OS_CT(UM): 666

## 2024-12-02 ASSESSMENT — REFRACTION_MANIFEST
OD_SPHERE: -1.25
OD_CYLINDER: +1.00
OD_AXIS: 070

## 2024-12-02 ASSESSMENT — SLIT LAMP EXAM - LIDS
COMMENTS: NORMAL
COMMENTS: NORMAL

## 2024-12-02 ASSESSMENT — REFRACTION_WEARINGRX
OD_SPHERE: -7.00
OD_CYLINDER: +1.00
OD_AXIS: 085

## 2024-12-02 NOTE — NURSING NOTE
Chief Complaints and History of Present Illnesses   Patient presents with    Cataract Evaluation     Chief Complaint(s) and History of Present Illness(es)       Cataract Evaluation              Laterality: right eye              Comments    Patient states has noticed more difficulty with seeing well at night time. No ocular pain. No floaters or flashes of light  No redness. No DM.    Ocular medications:  Prednisolone twice daily left eye  Latanoprost once at bedtime left eye    Simona Cuadra on 12/2/2024 at 12:26 PM                   Two major trips to the restroom during exam for the person accompanying patient, who did need to have back up while in the restroom.  Simona Cuadra on 12/2/2024 at 1:55 PM

## 2024-12-02 NOTE — PROGRESS NOTES
CC: Failed DSAEK OS/ ACIOL OS - s/p repeat DSAEK OS    HPI: Magali Macdonald is a 83 year old female s/p DSEK OS 5/9/18. H/o retinal detachment with repair and lensectomy with Dr. Maldonado. Patient had secondary ACIOL OS with Dr. Abraham. 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 Canchola who did a DSAEK OS. The patient then developed graft failure after 1 year, likely due to the ACIOL. Now s/p repeat DSAEK.      Interval hx 12/02/2024  Chief Complaint(s) and History of Present Illness(es)       Cataract Evaluation    In right eye.             Comments    Patient states has noticed more difficulty with seeing well at night time. No ocular pain. No floaters or flashes of light  No redness. No DM.    Ocular medications:  Prednisolone twice daily left eye  Latanoprost once at bedtime left eye    Simona Choit on 12/2/2024 at 12:26 PM                   Gtts: 12/2/2024  Pred Acetate BID left eye  Latanoprost QHS OS  PFATs prn (states she is not using very frequently)    POHx:  DSAEK, ACIOL explant, scleral fixated IOL, EDTA chelation left eye 6/25/19  DSEK left eye 5/9/2018 (Dr. Canchola)  ACIOL OS Dr. Abraham 10/15/14  Pars plana vitrectomy (PPV)/SBP/PPL/FGx OS 10/3/13 for recurrent RRD and cataract  Pars plana vitrectomy OS  (PPV)/FGx 6/2013 by Oli    Assessment:    # S/p DSAEK, ACIOL explant, scleral fixated IOL, EDTA chelation left eye 6/25/19  Left eye with loose suture superiorly in conj today, removed.     PLAN:   - continue prednisolone acetate to BID OS    - continue latanoprost QHS OS   - Continue preservative free artificial tears to using at least four times daily (using rarely currently) if symptoms worsen   -  VA stable, monitor   - refer to retina to evaluate for progression of ERM and CME OD   - discussed observation vs. CE/IOL OD alone vs. CE/IOL + MP OD - patient prefers to be conservative with MP   - patient wishes to defer at this time   - PATIENT WANTS ATTENDING  ONLY      # Cataract OD    - discussed possible CE/IOL OD again 12/2/24 - discussed progressive loss of vision and possible implications on her driving   - would try to optimize CME OD, patient may benefit from ERM peel with Dr. Maldonado at the same time   - Patient would like to defer for now, will discuss with retina - she would consider surgery next year   - Patient seeing Dr. Maldonado today   - poor candidate for multifocal IOL given retinal pathology - would recommend monofocal lens and cheaters    # H/o CME OS   - History of chronic cystoid macular edema, absent at last retina appointment (7/2021)   - Follow up with Dr. Maldonado today.     # ERM each eye   - Given symptoms of distortion and difference in image sizes   - Noted to be stable at last retina appointment    - Follow up with Dr. Maldonado today.     # Pseudophakia left eye   - Recommend trial of monovision with Dr. Coker (target near OS)    # NS right eye   - 20/40; would like to hold off on cataract surgery but becoming slightly more symptomatic   - Satisfied with RGP    # ON drusen    Follow up: 6 months Tobin w/ VT + Pachy LE, earlier if interested in surgery, call if problems sooner to clinic.    ALSO SEES:  Dr Joel Bai MD  Cornea and External Disease Fellow  [facility]    Attending Physician Attestation:  Complete documentation of historical and exam elements from today's encounter can be found in the full encounter summary report (not reduplicated in this progress note).  I personally obtained the chief complaint(s) and history of present illness.  I confirmed and edited as necessary the review of systems, past medical/surgical history, family history, social history, and examination findings as documented by others; and I examined the patient myself.  I personally reviewed the relevant tests, images, and reports as documented above.  I formulated and edited as necessary the  assessment and plan and discussed the findings and management plan with the patient and family. I personally reviewed the ophthalmic test(s) associated with this encounter, agree with the interpretation(s) as documented by the resident/fellow, and have edited the corresponding report(s) as necessary. - Moises Rudd MD

## 2024-12-29 ENCOUNTER — HEALTH MAINTENANCE LETTER (OUTPATIENT)
Age: 84
End: 2024-12-29

## 2025-05-15 ENCOUNTER — TELEPHONE (OUTPATIENT)
Dept: FAMILY MEDICINE | Facility: CLINIC | Age: 85
End: 2025-05-15
Payer: COMMERCIAL

## 2025-05-15 NOTE — TELEPHONE ENCOUNTER
Patient Quality Outreach    Patient is due for the following:   Physical Annual Wellness Visit    Action(s) Taken:   Schedule a Annual Wellness Visit    Type of outreach:    Sent [employer] message.    Questions for provider review:    None         Rvika Evans CMA  Chart routed to Care Team.